# Patient Record
Sex: FEMALE | Race: WHITE | NOT HISPANIC OR LATINO | Employment: OTHER | URBAN - METROPOLITAN AREA
[De-identification: names, ages, dates, MRNs, and addresses within clinical notes are randomized per-mention and may not be internally consistent; named-entity substitution may affect disease eponyms.]

---

## 2017-01-04 ENCOUNTER — OFFICE VISIT (OUTPATIENT)
Dept: FAMILY MEDICINE | Facility: CLINIC | Age: 67
End: 2017-01-04
Payer: MEDICARE

## 2017-01-04 VITALS
HEART RATE: 55 BPM | DIASTOLIC BLOOD PRESSURE: 85 MMHG | TEMPERATURE: 99 F | SYSTOLIC BLOOD PRESSURE: 133 MMHG | WEIGHT: 136.25 LBS | BODY MASS INDEX: 23.26 KG/M2 | HEIGHT: 64 IN

## 2017-01-04 DIAGNOSIS — M94.9 DISORDER OF BONE AND CARTILAGE: ICD-10-CM

## 2017-01-04 DIAGNOSIS — M85.80 OSTEOPENIA: ICD-10-CM

## 2017-01-04 DIAGNOSIS — C50.912 BILATERAL MALIGNANT NEOPLASM OF BREAST IN FEMALE, UNSPECIFIED SITE OF BREAST: ICD-10-CM

## 2017-01-04 DIAGNOSIS — E04.1 THYROID NODULE: ICD-10-CM

## 2017-01-04 DIAGNOSIS — Z13.0 SCREENING FOR DEFICIENCY ANEMIA: ICD-10-CM

## 2017-01-04 DIAGNOSIS — C50.911 BILATERAL MALIGNANT NEOPLASM OF BREAST IN FEMALE, UNSPECIFIED SITE OF BREAST: ICD-10-CM

## 2017-01-04 DIAGNOSIS — Z00.00 ROUTINE MEDICAL EXAM: Primary | ICD-10-CM

## 2017-01-04 DIAGNOSIS — Z23 IMMUNIZATION DUE: ICD-10-CM

## 2017-01-04 DIAGNOSIS — M89.9 DISORDER OF BONE AND CARTILAGE: ICD-10-CM

## 2017-01-04 PROBLEM — E21.0 PRIMARY HYPERPARATHYROIDISM: Status: ACTIVE | Noted: 2017-01-04

## 2017-01-04 PROCEDURE — 90670 PCV13 VACCINE IM: CPT | Mod: S$GLB,,, | Performed by: FAMILY MEDICINE

## 2017-01-04 PROCEDURE — 99999 PR PBB SHADOW E&M-EST. PATIENT-LVL III: CPT | Mod: PBBFAC,,, | Performed by: FAMILY MEDICINE

## 2017-01-04 PROCEDURE — 99397 PER PM REEVAL EST PAT 65+ YR: CPT | Mod: 25,S$GLB,, | Performed by: FAMILY MEDICINE

## 2017-01-04 PROCEDURE — G0009 ADMIN PNEUMOCOCCAL VACCINE: HCPCS | Mod: S$GLB,,, | Performed by: FAMILY MEDICINE

## 2017-01-04 RX ORDER — CHOLECALCIFEROL (VITAMIN D3) 125 MCG
1 CAPSULE ORAL DAILY
COMMUNITY
End: 2022-09-13

## 2017-01-04 RX ORDER — MINOXIDIL 50 MG/G
AEROSOL, FOAM TOPICAL DAILY
COMMUNITY
End: 2023-02-27

## 2017-01-04 NOTE — MR AVS SNAPSHOT
[facility]  [address] [address]  Phone: [phone]  Fax: [phone]                  Tiana Joseph   2017 1:20 PM   Office Visit    Description:  Female : 1950   Provider:  Tena Blanco MD   Department:  [facility]           Reason for Visit     Annual Exam           Diagnoses this Visit        Comments    Routine medical exam    -  Primary     Osteopenia         Immunization due         Screening for deficiency anemia         Disorder of bone and cartilage         Thyroid nodule         Bilateral malignant neoplasm of breast in female, unspecified site of breast                To Do List           Future Appointments        Provider Department Dept Phone    1/10/2017 1:00 PM [facility] DEXA1 [facility] [phone]      Goals (5 Years of Data)     None      Follow-Up and Disposition     Return in about 1 year (around 2018) for routine medical exam.    Follow-up and Disposition History      [facility]     [facility] -  Assistance  Registered nurses in the [facility] provide clinical advisement, health education, appointment booking, and other advisory services.  Call for this free service at [phone].             Medications           Message regarding Medications     Verify the changes and/or additions to your medication regime listed below are the same as discussed with your clinician today.  If any of these changes or additions are incorrect, please notify your healthcare provider.        STOP taking these medications     hydrOXYzine HCl (ATARAX) 25 MG tablet Take 25 mg by mouth 3 (three) times daily as needed for Itching.           Verify that the below list of medications is an accurate representation of the medications you are currently taking.  If none reported, the list may be blank. If incorrect, please contact your healthcare provider. Carry this list with you in case of  "emergency.           Current Medications     amino acids-minerals (A/G PRO) Tab Take 2 tablets by mouth 3 (three) times daily.    aspirin (ECOTRIN) 81 MG EC tablet Take 81 mg by mouth once daily.    biotin 10,000 mcg TbDL Take 1 tablet by mouth once daily.    CALCIUM CARBONATE/VITAMIN D3 (CALCIUM 500 + D ORAL) Take by mouth. 1 Capsule Oral Every evening    cetirizine (ZYRTEC) 5 MG tablet Take 5 mg by mouth daily as needed.     coenzyme Q10-vitamin E (COQ10 ) 100-100 mg-unit Cap Take by mouth. 1 Capsule Oral Every day    fish oil-dha-epa 1,200-144-216 mg Cap Take by mouth. 1 Capsule Oral Every day    minoxidil (ROGAINE) 5 % Foam Apply topically once daily.    multivitamin (THERAGRAN) per tablet Take by mouth. 1 Tablet Oral Every day           Clinical Reference Information           Vital Signs - Last Recorded  Most recent update: 1/4/2017  1:27 PM by Ninfa Mason MA    BP Pulse Temp Ht Wt BMI    133/85 (!) 55 98.9 °F (37.2 °C) (Oral) 5' 3.5" (1.613 m) 61.8 kg (136 lb 3.9 oz) 23.76 kg/m2      Blood Pressure          Most Recent Value    BP  133/85      Allergies as of 1/4/2017     Sulfa (Sulfonamide Antibiotics)      Immunizations Administered on Date of Encounter - 1/4/2017     Name Date Dose VIS Date Route    Pneumococcal Conjugate - 13 Valent 1/4/2017 0.5 mL 11/5/2015 Intramuscular      Orders Placed During Today's Visit      Normal Orders This Visit    Pneumococcal Conjugate Vaccine (13 Valent) (IM)     Future Labs/Procedures Expected by Expires    CBC auto differential  1/4/2017 1/4/2018    Comprehensive metabolic panel  1/4/2017 1/4/2018    DXA Bone Density Spine And Hip_Axial Skeleton  1/4/2017 1/4/2018    Lipid panel  1/4/2017 1/4/2018    TSH  1/4/2017 1/4/2018      "

## 2017-01-04 NOTE — PROGRESS NOTES
CHIEF COMPLAINT:  Routine medical exam      HISTORY OF PRESENT ILLNESS:  Tiana Joseph is a 67 y.o. female who presents to clinic as a new patient to me for a routine medical exam. She has no specific concerns. She follows up with endocrinology, oncology. She is due for screening lab work, DEXA scan. She follows up with her dentist, optometrist annually.       REVIEW OF SYSTEMS:  The patient denies any fever, chills, night sweats, headaches, vision changes, difficulty speaking or swallowing, decreased hearing, weight loss, weight gain, chest pain, palpitations, shortness of breath, cough, nausea, vomiting, abdominal pain, dysuria, diarrhea, constipation, hematuria, hematochezia, melena, changes in her hair, skin, nails, numbness or weakness in her extremities, erythema, pain or swelling over any of her joints, myalgia, swollen glands, easy bruising, fatigue, edema, symptoms of anxiety or depression. She denies any vaginal discharge, breast masses, nipple discharge, change in the skin overlying her breasts.      MEDICATIONS:   Reviewed and/or reconciled in EPIC    ALLERGIES:  Reviewed and/or reconciled in [facility]    PAST MEDICAL/SURGICAL HISTORY:   Past Medical History   Diagnosis Date    Breast cancer 2000    Depression     Osteopenia       Past Surgical History   Procedure Laterality Date    Breast lumpectomy  2000     Right breast    Hysterectomy  1998     KELSEY, fibroid    Bilateral oophorectomy       benign    Femoral hernia  2010     right side repair    Mastectomy  2013      bilateral     Tonsillectomy      Adenoidectomy      Hemorrhoid surgery         FAMILY HISTORY:    Family History   Problem Relation Age of Onset    Cancer Mother      breast    Breast cancer Mother      Breast cancer-75yrs    Hypertension Father     Heart disease Father 56     MI    Cancer Maternal Aunt      ovarian    Ovarian cancer Maternal Aunt     Cancer Maternal Grandfather      testicular / prostate    Stroke Paternal  "Grandmother     Breast cancer Maternal Grandmother      Breast cancer-42yrs    Parkinsonism Paternal Grandfather     Breast cancer Other      breast cancer in maternal great greatgrandmother    Amblyopia Neg Hx     Blindness Neg Hx     Cataracts Neg Hx     Diabetes Neg Hx     Glaucoma Neg Hx     Macular degeneration Neg Hx     Retinal detachment Neg Hx     Strabismus Neg Hx     Thyroid disease Neg Hx     Colon cancer Neg Hx        SOCIAL HISTORY:    Social History     Social History    Marital status:      Spouse name: N/A    Number of children: N/A    Years of education: N/A     Occupational History    Not on file.     Social History Main Topics    Smoking status: Never Smoker    Smokeless tobacco: Never Used    Alcohol use 0.6 oz/week     1 Glasses of wine per week      Comment: Occasionally    Drug use: No    Sexual activity: Yes     Partners: Male     Birth control/ protection: Post-menopausal      Comment:      Other Topics Concern    Not on file     Social History Narrative    No narrative on file     She eats a healthy diet, goes to [employer] and ballroom dances for exercises.     PHYSICAL EXAM:  VITAL SIGNS:   Vitals:    01/04/17 1317   BP: 133/85   Pulse: (!) 55   Temp: 98.9 °F (37.2 °C)   TempSrc: Oral   Weight: 61.8 kg (136 lb 3.9 oz)   Height: 5' 3.5" (1.613 m)     GENERAL:  Patient appears well nourished, sitting on exam table, in no acute distress.  HEENT:  Atraumatic, normocephalic, PERRLA, EOMI, no conjunctival injection, sclerae are anicteric, normal external auditory canals,TMs clear b/l, gross hearing intact to whisper, MMM, no oropharygneal erythema or exudate.  NECK:  Supple, normal ROM, trachea is midline , no supraclavicular or cervical LAD or masses palpated.  Thyroid gland not palpable.  CARDIOVASCULAR:  RRR, normal S1 and S2, no m/r/g.  RESPIRATORY:  CTA b/l, no wheezes, rhonchi, rales.  No increased work of breathing, no  use of " accessory muscles.  ABDOMEN:  Soft, nontender, nondistended, normoactive bowel sounds in all four quadrants, no rebound or guarding, no HSM or masses palpated.  Normal percussion.  EXTREMITIES:  2+ DP pulses b/l, no edema.  SKIN:  Warm, no lesions on exposed skin.  NEUROMUSCULAR:  Cranial nerves II-XII grossly intact.  Strength is 4+/5 over upper and lower extremity flexors/extensors b/l, 2+ biceps and patellar reflexes b/l. No clubbing or cyanosis of digits/nails.  Steady gait.  PSYCH:  Patient is alert and oriented to person, time, place. They are appropriately dressed and groomed. There is normal eye contact. Rate and tone of speech is normal. Normal insight, judgement. Normal thought content and process.     LABORATORY/IMAGING STUDIES: pending    ASSESSMENT/PLAN: This is a 67 y.o. female who presents to clinic for routine medical exam  1. Routine medical exam: We will check a screening CBC, CMP, TSH, fasting lipid panel. Patient will be notified of these results and the need for any further evaluation and treatment.  She will follow up with her dentist/optometrist as scheduled. We discussed the importance of increasing her exercise, continuing with a healthy, well-balanced diet.   2. Osteopenia: DEXA scan  3. Immunization due: will receive prevnar 13 in clinic today and pneumovax in 6 months          Tena Blanco MD

## 2017-01-10 ENCOUNTER — HOSPITAL ENCOUNTER (OUTPATIENT)
Dept: RADIOLOGY | Facility: CLINIC | Age: 67
Discharge: HOME | End: 2017-01-10
Attending: FAMILY MEDICINE
Payer: MEDICARE

## 2017-01-10 DIAGNOSIS — M94.9 DISORDER OF BONE AND CARTILAGE: ICD-10-CM

## 2017-01-10 DIAGNOSIS — M89.9 DISORDER OF BONE AND CARTILAGE: ICD-10-CM

## 2017-01-10 DIAGNOSIS — M85.80 OSTEOPENIA: ICD-10-CM

## 2017-01-10 PROCEDURE — 77080 DXA BONE DENSITY AXIAL: CPT | Mod: 26,,, | Performed by: RADIOLOGY

## 2017-01-10 PROCEDURE — 77080 DXA BONE DENSITY AXIAL: CPT | Mod: TC,PO

## 2017-01-14 LAB
ALBUMIN SERPL-MCNC: 4.7 G/DL (ref 3.6–4.8)
ALBUMIN/GLOB SERPL: 2.2 {RATIO} (ref 1.1–2.5)
ALP SERPL-CCNC: 67 IU/L (ref 39–117)
ALT SERPL-CCNC: 16 IU/L (ref 0–32)
AMBIG ABBREV CMP 14 DEFAULT: NORMAL
AMBIG ABBREV LP DEFAULT: NORMAL
AST SERPL-CCNC: 17 IU/L (ref 0–40)
BASOPHILS # BLD AUTO: 0.1 X10E3/UL (ref 0–0.2)
BASOPHILS NFR BLD AUTO: 2 %
BILIRUB SERPL-MCNC: 0.4 MG/DL (ref 0–1.2)
BUN SERPL-MCNC: 19 MG/DL (ref 8–27)
BUN/CREAT SERPL: 30 (ref 11–26)
CALCIUM SERPL-MCNC: 9.9 MG/DL (ref 8.7–10.3)
CHLORIDE SERPL-SCNC: 102 MMOL/L (ref 96–106)
CHOLEST SERPL-MCNC: 201 MG/DL (ref 100–199)
CO2 SERPL-SCNC: 25 MMOL/L (ref 18–29)
CREAT SERPL-MCNC: 0.63 MG/DL (ref 0.57–1)
EOSINOPHIL # BLD AUTO: 0.2 X10E3/UL (ref 0–0.4)
EOSINOPHIL NFR BLD AUTO: 3 %
ERYTHROCYTE [DISTWIDTH] IN BLOOD BY AUTOMATED COUNT: 13.6 % (ref 12.3–15.4)
GLOBULIN SER CALC-MCNC: 2.1 G/DL (ref 1.5–4.5)
GLUCOSE SERPL-MCNC: 99 MG/DL (ref 65–99)
HCT VFR BLD AUTO: 43.1 % (ref 34–46.6)
HDLC SERPL-MCNC: 66 MG/DL
HGB BLD-MCNC: 13.8 G/DL (ref 11.1–15.9)
IMM GRANULOCYTES # BLD: 0 X10E3/UL (ref 0–0.1)
IMM GRANULOCYTES NFR BLD: 0 %
LDLC SERPL CALC-MCNC: 122 MG/DL (ref 0–99)
LYMPHOCYTES # BLD AUTO: 2.1 X10E3/UL (ref 0.7–3.1)
LYMPHOCYTES NFR BLD AUTO: 37 %
MCH RBC QN AUTO: 28.1 PG (ref 26.6–33)
MCHC RBC AUTO-ENTMCNC: 32 G/DL (ref 31.5–35.7)
MCV RBC AUTO: 88 FL (ref 79–97)
MONOCYTES # BLD AUTO: 0.4 X10E3/UL (ref 0.1–0.9)
MONOCYTES NFR BLD AUTO: 7 %
NEUTROPHILS # BLD AUTO: 2.9 X10E3/UL (ref 1.4–7)
NEUTROPHILS NFR BLD AUTO: 51 %
PLATELET # BLD AUTO: 247 X10E3/UL (ref 150–379)
POTASSIUM SERPL-SCNC: 4.8 MMOL/L (ref 3.5–5.2)
PROT SERPL-MCNC: 6.8 G/DL (ref 6–8.5)
RBC # BLD AUTO: 4.91 X10E6/UL (ref 3.77–5.28)
SODIUM SERPL-SCNC: 143 MMOL/L (ref 134–144)
TRIGL SERPL-MCNC: 66 MG/DL (ref 0–149)
TSH SERPL DL<=0.005 MIU/L-ACNC: 2.46 UIU/ML (ref 0.45–4.5)
VLDLC SERPL CALC-MCNC: 13 MG/DL (ref 5–40)
WBC # BLD AUTO: 5.6 X10E3/UL (ref 3.4–10.8)

## 2017-01-30 ENCOUNTER — TELEPHONE (OUTPATIENT)
Dept: ADMINISTRATIVE | Facility: OTHER | Age: 67
End: 2017-01-30

## 2017-01-30 ENCOUNTER — TELEPHONE (OUTPATIENT)
Dept: ENDOCRINOLOGY | Facility: CLINIC | Age: 67
End: 2017-01-30

## 2017-01-30 DIAGNOSIS — E83.52 HYPERCALCEMIA: Primary | ICD-10-CM

## 2017-01-30 NOTE — TELEPHONE ENCOUNTER
----- Message from Karine Hook sent at 1/30/2017 12:09 PM CST -----  Contact: self  I sent to you due to the pt requesting main campus.  ----- Message -----     From: Yfn Gates     Sent: 1/30/2017  11:56 AM       To: Karine Tilley, i am not sure if Mrs. Joseph would like to switch over to [facility] if so please send back to me. Thanks   ----- Message -----     From: Karine Hook     Sent: 1/30/2017  11:40 AM       To: Yfn Gates    Pt is calling to schedule her March follow up visit.  Pt would like a call before scheduling.    Contact number  [phone]

## 2017-01-30 NOTE — TELEPHONE ENCOUNTER
----- Message from Karine Hook sent at 1/30/2017 12:59 PM CST -----  Contact: self  Pt is returning a missed call    Contact number [phone]

## 2017-02-13 DIAGNOSIS — C50.919 MALIGNANT NEOPLASM OF FEMALE BREAST, UNSPECIFIED LATERALITY, UNSPECIFIED SITE OF BREAST: Primary | ICD-10-CM

## 2017-03-28 ENCOUNTER — OFFICE VISIT (OUTPATIENT)
Dept: OPTOMETRY | Facility: CLINIC | Age: 67
End: 2017-03-28
Payer: MEDICARE

## 2017-03-28 ENCOUNTER — OFFICE VISIT (OUTPATIENT)
Dept: HEMATOLOGY/ONCOLOGY | Facility: CLINIC | Age: 67
End: 2017-03-28
Attending: INTERNAL MEDICINE
Payer: MEDICARE

## 2017-03-28 ENCOUNTER — OFFICE VISIT (OUTPATIENT)
Dept: INTERNAL MEDICINE | Facility: CLINIC | Age: 67
End: 2017-03-28
Payer: MEDICARE

## 2017-03-28 ENCOUNTER — TELEPHONE (OUTPATIENT)
Dept: HEMATOLOGY/ONCOLOGY | Facility: CLINIC | Age: 67
End: 2017-03-28

## 2017-03-28 VITALS
RESPIRATION RATE: 20 BRPM | TEMPERATURE: 99 F | WEIGHT: 138 LBS | OXYGEN SATURATION: 95 % | SYSTOLIC BLOOD PRESSURE: 146 MMHG | DIASTOLIC BLOOD PRESSURE: 75 MMHG | HEART RATE: 60 BPM | BODY MASS INDEX: 24.06 KG/M2

## 2017-03-28 VITALS
SYSTOLIC BLOOD PRESSURE: 148 MMHG | RESPIRATION RATE: 16 BRPM | HEART RATE: 58 BPM | WEIGHT: 138 LBS | BODY MASS INDEX: 24.45 KG/M2 | OXYGEN SATURATION: 95 % | DIASTOLIC BLOOD PRESSURE: 80 MMHG | HEIGHT: 63 IN

## 2017-03-28 DIAGNOSIS — I70.0 AORTIC ATHEROSCLEROSIS: ICD-10-CM

## 2017-03-28 DIAGNOSIS — H52.03 HYPEROPIA, BILATERAL: ICD-10-CM

## 2017-03-28 DIAGNOSIS — E21.0 PRIMARY HYPERPARATHYROIDISM: ICD-10-CM

## 2017-03-28 DIAGNOSIS — H52.4 PRESBYOPIA: ICD-10-CM

## 2017-03-28 DIAGNOSIS — Z86.000 HISTORY OF DUCTAL CARCINOMA IN SITU (DCIS) OF BREAST: ICD-10-CM

## 2017-03-28 DIAGNOSIS — Z85.3 HISTORY OF BREAST CANCER: ICD-10-CM

## 2017-03-28 DIAGNOSIS — Z00.00 ENCOUNTER FOR PREVENTIVE HEALTH EXAMINATION: Primary | ICD-10-CM

## 2017-03-28 DIAGNOSIS — L65.9 ALOPECIA: ICD-10-CM

## 2017-03-28 DIAGNOSIS — H25.13 NS (NUCLEAR SCLEROSIS), BILATERAL: ICD-10-CM

## 2017-03-28 DIAGNOSIS — M85.80 OSTEOPENIA: ICD-10-CM

## 2017-03-28 DIAGNOSIS — E04.1 THYROID NODULE: ICD-10-CM

## 2017-03-28 DIAGNOSIS — H04.123 DRY EYE SYNDROME, BILATERAL: Primary | ICD-10-CM

## 2017-03-28 PROCEDURE — 99499 UNLISTED E&M SERVICE: CPT | Mod: S$GLB,,, | Performed by: NURSE PRACTITIONER

## 2017-03-28 PROCEDURE — 92015 DETERMINE REFRACTIVE STATE: CPT | Mod: S$GLB,,, | Performed by: OPTOMETRIST

## 2017-03-28 PROCEDURE — 1159F MED LIST DOCD IN RCRD: CPT | Mod: S$GLB,,, | Performed by: INTERNAL MEDICINE

## 2017-03-28 PROCEDURE — 99999 PR PBB SHADOW E&M-EST. PATIENT-LVL II: CPT | Mod: PBBFAC,,, | Performed by: OPTOMETRIST

## 2017-03-28 PROCEDURE — 99214 OFFICE O/P EST MOD 30 MIN: CPT | Mod: S$GLB,,, | Performed by: INTERNAL MEDICINE

## 2017-03-28 PROCEDURE — 92014 COMPRE OPH EXAM EST PT 1/>: CPT | Mod: S$GLB,,, | Performed by: OPTOMETRIST

## 2017-03-28 PROCEDURE — 99999 PR PBB SHADOW E&M-EST. PATIENT-LVL IV: CPT | Mod: PBBFAC,,, | Performed by: NURSE PRACTITIONER

## 2017-03-28 PROCEDURE — 99999 PR PBB SHADOW E&M-EST. PATIENT-LVL III: CPT | Mod: PBBFAC,,, | Performed by: INTERNAL MEDICINE

## 2017-03-28 PROCEDURE — G0439 PPPS, SUBSEQ VISIT: HCPCS | Mod: S$GLB,,, | Performed by: NURSE PRACTITIONER

## 2017-03-28 PROCEDURE — 1160F RVW MEDS BY RX/DR IN RCRD: CPT | Mod: S$GLB,,, | Performed by: INTERNAL MEDICINE

## 2017-03-28 PROCEDURE — 1157F ADVNC CARE PLAN IN RCRD: CPT | Mod: S$GLB,,, | Performed by: INTERNAL MEDICINE

## 2017-03-28 PROCEDURE — 1126F AMNT PAIN NOTED NONE PRSNT: CPT | Mod: S$GLB,,, | Performed by: INTERNAL MEDICINE

## 2017-03-28 NOTE — MR AVS SNAPSHOT
[facility]  [address]  [place] [address]  Phone: [phone]  Fax: [phone]                  Tiana Joseph   3/28/2017 2:00 PM   Office Visit    Description:  Female : 1950   Provider:  Lacy Parks NP   Department:  [facility]           Reason for Visit     Health Risk Assessment           Diagnoses this Visit        Comments    Encounter for preventive health examination    -  Primary     Aortic atherosclerosis                To Do List           Future Appointments        Provider Department Dept Phone    2017 10:00 AM LAB, [facility] [phone]    2017 11:00 AM Ramos Iverson MD [facility] - Endo/Diab/Metab [phone]      Goals (5 Years of Data)     None      Follow-Up and Disposition     Return in about 1 year (around 3/28/2018) for your next Health Risk Assessment visit.      [facility]     [facility] Line -  Assistance  Registered nurses in the [facility] provide clinical advisement, health education, appointment booking, and other advisory services.  Call for this free service at [phone].             Medications           Message regarding Medications     Verify the changes and/or additions to your medication regime listed below are the same as discussed with your clinician today.  If any of these changes or additions are incorrect, please notify your healthcare provider.             Verify that the below list of medications is an accurate representation of the medications you are currently taking.  If none reported, the list may be blank. If incorrect, please contact your healthcare provider. Carry this list with you in case of emergency.           Current Medications     amino acids-minerals (A/G PRO) Tab Take 2 tablets by mouth 3 (three) times daily.    aspirin (ECOTRIN) 81 MG EC tablet Take 81 mg by mouth once daily.    biotin 10,000 mcg TbDL Take 1 tablet  by mouth once daily.    CALCIUM CARBONATE/VITAMIN D3 (CALCIUM 500 + D ORAL) Take by mouth. 1 Capsule Oral Every evening    cetirizine (ZYRTEC) 5 MG tablet Take 5 mg by mouth daily as needed.     coenzyme Q10-vitamin E (COQ10 ) 100-100 mg-unit Cap Take by mouth. 1 Capsule Oral Every day    fish oil-dha-epa 1,200-144-216 mg Cap Take by mouth. 1 Capsule Oral Every day    lifitegrast 5 % Dpet Apply 1 drop to eye 2 (two) times daily.    minoxidil (ROGAINE) 5 % Foam Apply topically once daily.    multivitamin (THERAGRAN) per tablet Take by mouth. 1 Tablet Oral Every day           Clinical Reference Information           Your Vitals Were     BP Pulse Resp SpO2          148/80 (BP Location: Left arm, Patient Position: Sitting, BP Method: Manual) 52 16 95%        Blood Pressure          Most Recent Value    BP  (!)  148/80      Allergies as of 3/28/2017     Sulfa (Sulfonamide Antibiotics)      Immunizations Administered on Date of Encounter - 3/28/2017     None      Instructions      Counseling and Referral of Other Preventative  (Italic type indicates deductible and co-insurance are waived)    Patient Name: Tiana Joseph  Today's Date: 3/28/2017      SERVICE LIMITATIONS RECOMMENDATION    Vaccines    · Pneumococcal (once after 65)    · Influenza (annually)    · Hepatitis B (if medium/high risk)    · Prevnar 13      Hepatitis B medium/high risk factors:       - End-stage renal disease       - Hemophiliacs who received Factor VII or         IX concentrates       - Clients of institutions for the mentally             retarded       - Persons who live in the same house as          a HepB carrier       - Homosexual men       - Illicit injectable drug abusers     Pneumococcal: N/A.  Will receive in 2018     Influenza: Done, repeat in one year     Hepatitis B: N/A     Prevnar 13: N/A.  Completed    Mammogram (biennial age 50-74)  Annually (age 40 or over)  Not clinically indicated    Pap (up to age 70 and after 70 if unknown  history or abnormal study last 10 years)    N/A     The USPSTF recommends against screening for cervical cancer in women who have had a hysterectomy with removal of the cervix and who do not have a history of a high-grade precancerous lesion (cervical intraepithelial neoplasia [MEAGAN] grade 2 or 3) or cervical cancer.     Colorectal cancer screening (to age 75)    · Fecal occult blood test (annual)  · Flexible sigmoidoscopy (5y)  · Screening colonoscopy (10y)  · Barium enema   Last done 12/2008, recommend to repeat every 10  years    Diabetes self-management training (no USPSTF recommendations)  Requires referral by treating physician for patient with diabetes or renal disease. 10 hours of initial DSMT sessions of no less than 30 minutes each in a continuous 12-month period. 2 hours of follow-up DSMT in subsequent years.  N/A    Bone mass measurements (age 65 & older, biennial)  Requires diagnosis related to osteoporosis or estrogen deficiency. Biennial benefit unless patient has history of long-term glucocorticoid  Last done 1/2017, recommend to repeat every 3  years    Glaucoma screening (no USPSTF recommendation)  Diabetes mellitus, family history   , age 50 or over    American, age 65 or over  Last done 3/2017, recommend to repeat every 1  years    Medical nutrition therapy for diabetes or renal disease (no recommended schedule)  Requires referral by treating physician for patient with diabetes or renal disease or kidney transplant within the past 3 years.  Can be provided in same year as diabetes self-management training (DSMT), and CMS recommends medical nutrition therapy take place after DSMT. Up to 3 hours for initial year and 2 hours in subsequent years.  N/A    Cardiovascular screening blood tests (every 5 years)  · Fasting lipid panel  Order as a panel if possible  Last done 1/2017, recommend to repeat every 1  years    Diabetes screening tests (at least every 3 years, Medicare  covers annually or at 6-month intervals for prediabetic patients)  · Fasting blood sugar (FBS) or glucose tolerance test (GTT)  Patient must be diagnosed with one of the following:       - Hypertension       - Dyslipidemia       - Obesity (BMI 30kg/m2)       - Previous elevated impaired FBS or GTT       ... or any two of the following:       - Overweight (BMI 25 but <30)       - Family history of diabetes       - Age 65 or older       - History of gestational diabetes or birth of baby weighing more than 9 pounds  Last done 1/2017, recommend to repeat every 1  years    Abdominal aortic aneurysm screening (once)  · Sonogram   Limited to patients who meet one of the following criteria:       - Men who are 65-75 years old and have smoked more than 100 cigarette in their lifetime       - Anyone with a family history of abdominal aortic aneurysm       - Anyone recommended for screening by the USPSTF  N/A    HIV screening (annually for increased risk patients)  · HIV-1 and HIV-2 by EIA, or SHA, rapid antibody test or oral mucosa transudate  Patients must be at increased risk for HIV infection per USPSTF guidelines or pregnant. Tests covered annually for patient at increased risk or as requested by the patient. Pregnant patients may receive up to 3 tests during pregnancy.  Risks discussed, screening is not recommended    Smoking cessation counseling (up to 8 sessions per year)  Patients must be asymptomatic of tobacco-related conditions to receive as a preventative service.  Not a smoker    Subsequent annual wellness visit  At least 12 months since last AWV  Return in one year     The following information is provided to all patients.  This information is to help you find resources for any of the problems found today that may be affecting your health:                Living healthy guide: www.[facility].gov      Understanding Diabetes: www.diabetes.org      Eating healthy: www.cdc.gov/healthyweight      CDC home safety  checklist: www.cdc.gov/steadi/patient.html      Agency on Aging: www.goea.[place].gov      Alcoholics anonymous (AA): www.aa.org      Physical Activity: www.yunior.nih.gov/ww5aqdo      Tobacco use: www.quitwithusla.org          Language Assistance Services     ATTENTION: Language assistance services are available, free of charge. Please call [phone].      ATENCIÓN: Si sabasla jd, tiene a huffman disposición servicios gratuitos de asistencia lingüística. Llame al [phone].     CHÚ Ý: N?u b?n nói Ti?ng Vi?t, có các d?ch v? h? tr? ngôn ng? mi?n phí dành cho b?n. G?i s? [phone].         [facility] complies with applicable Federal civil rights laws and does not discriminate on the basis of race, color, national origin, age, disability, or sex.

## 2017-03-28 NOTE — PROGRESS NOTES
Subjective:       Patient ID: Tiana Joseph is a 67 y.o. female.    Chief Complaint: Follow-up    HPI     Returns for follow-up of her history of breat cancer.  Has overall felt well.  In the interval-    As follow-up for hair loss discussed last year- she avoids the sun and uses Rogaine in affected areas  Energy level is adequate  Reports exercising 3-4 x per week (Pilates, yoga, ballroom dancing)  Cyst found on thyroid by endocrinology- will be monitored  No pain other than age related stiffness in joints    Oncology History:  Diagnosed in 2002 with breast cancer (intracystic papillary)- very small, discovered at [facility] by mammogram, Dr. Cosby wanted re-read on pathology and underwent lumpectomy, required re-excision and XRT.  Negative BRCA performed for strong family history (mother breast ca, maternal gm breast ca, maternal aunt- ovarian ca)  Received no adjuvant therapy     In December 2012- she had an abnormal left mammogram, bx +DCIS,, opted for  Bilateral mastectomy 1/31/13.  Took Evista x 1-2 months- leg cramps- stopped after discussing with Dr. Cosby due to side effects    Review of Systems   Constitutional: Negative for activity change, appetite change, chills, fatigue, fever and unexpected weight change.   HENT: Negative for congestion, ear pain, hearing loss, mouth sores, nosebleeds, postnasal drip, rhinorrhea, sinus pressure and sore throat.    Eyes: Negative for redness and visual disturbance (visual floaters).   Respiratory: Negative for cough and shortness of breath.    Cardiovascular: Negative for chest pain, palpitations and leg swelling.   Gastrointestinal: Negative for abdominal distention, abdominal pain, blood in stool, constipation, nausea and vomiting.   Genitourinary: Negative for difficulty urinating, dysuria, frequency, pelvic pain and urgency.   Musculoskeletal: Negative for arthralgias, back pain, gait problem, joint swelling and myalgias.   Skin: Negative for color change,  pallor, rash and wound.   Neurological: Negative for dizziness, weakness, light-headedness, numbness and headaches.   Psychiatric/Behavioral: Negative for dysphoric mood. The patient is not nervous/anxious.        Objective:      Physical Exam   Constitutional: She is oriented to person, place, and time. She appears well-developed and well-nourished. No distress.   Presents alone   HENT:   Head: Normocephalic and atraumatic.   Right Ear: External ear normal.   Left Ear: External ear normal.   Nose: Nose normal.   Mouth/Throat: Oropharynx is clear and moist. No oropharyngeal exudate.   Eyes: Conjunctivae and EOM are normal. Pupils are equal, round, and reactive to light. Right eye exhibits no discharge. Left eye exhibits no discharge. No scleral icterus. Right pupil is round and reactive. Left pupil is round and reactive.   Neck: Normal range of motion. Neck supple. No JVD present. No tracheal deviation present. No thyromegaly present.   Cardiovascular: Normal rate, regular rhythm, normal heart sounds and intact distal pulses.  Exam reveals no gallop and no friction rub.    No murmur heard.  Pulmonary/Chest: Effort normal and breath sounds normal. No respiratory distress. She has no wheezes. She has no rales.   Abdominal: Soft. Bowel sounds are normal. She exhibits no distension and no mass. There is no hepatosplenomegaly. There is no tenderness. There is no rebound and no guarding.   Musculoskeletal: Normal range of motion. She exhibits no edema or tenderness.   Lymphadenopathy:        Head (right side): No submandibular adenopathy present.        Head (left side): No submandibular adenopathy present.     She has no cervical adenopathy.        Right cervical: No superficial cervical, no deep cervical and no posterior cervical adenopathy present.       Left cervical: No superficial cervical, no deep cervical and no posterior cervical adenopathy present.        Right: No inguinal and no supraclavicular adenopathy  present.        Left: No inguinal and no supraclavicular adenopathy present.   Neurological: She is alert and oriented to person, place, and time. She has normal strength. No cranial nerve deficit or sensory deficit. She exhibits normal muscle tone. Coordination normal.   Skin: Skin is warm and dry. No bruising, no lesion, no petechiae and no rash noted. She is not diaphoretic. No erythema. No pallor.   Psychiatric: She has a normal mood and affect. Her behavior is normal. Judgment and thought content normal. Her mood appears not anxious. She does not exhibit a depressed mood.   Nursing note and vitals reviewed.    Labs- reviewed  Assessment:       1. History of breast cancer    2. History of ductal carcinoma in situ (DCIS) of breast        Plan:     1. USHA  RTC 1 year  Last Vit D adequate  Knows to call for any issues

## 2017-03-28 NOTE — PROGRESS NOTES
"Tiana Joseph presented for a  Medicare AWV and comprehensive Health Risk Assessment today. The following components were reviewed and updated:    · Medical history  · Family History  · Social history  · Allergies and Current Medications  · Health Risk Assessment  · Health Maintenance  · Care Team     ** See Completed Assessments for Annual Wellness Visit within the encounter summary.**       The following assessments were completed:  · Living Situation  · CAGE  · Depression Screening  · Timed Get Up and Go  · Whisper Test  · Cognitive Function Screening  · Nutrition Screening  · ADL Screening  · PAQ Screening    Vitals:    03/28/17 1352   BP: (!) 148/80   BP Location: Left arm   Patient Position: Sitting   BP Method: Manual   Pulse: (!) 58   Resp: 16   SpO2: 95%   Weight: 62.6 kg (138 lb)   Height: 5' 3" (1.6 m)     Body mass index is 24.45 kg/(m^2).  Physical Exam   Constitutional: She is oriented to person, place, and time. She appears well-developed and well-nourished. No distress.   HENT:   Head: Normocephalic.   Right Ear: External ear normal.   Left Ear: External ear normal.   Nose: Nose normal.   Eyes: Conjunctivae are normal. No scleral icterus.   Neck: Normal range of motion. Neck supple.   Cardiovascular: Normal rate, regular rhythm, normal heart sounds and intact distal pulses.  Exam reveals no gallop and no friction rub.    No murmur heard.  Pulmonary/Chest: Effort normal and breath sounds normal. No respiratory distress. She has no wheezes. She has no rales. She exhibits no tenderness.   Abdominal: Soft. Bowel sounds are normal.   Musculoskeletal: Normal range of motion. She exhibits no edema.   Neurological: She is alert and oriented to person, place, and time.   Skin: Skin is warm and dry. She is not diaphoretic. No erythema.   Psychiatric: She has a normal mood and affect. Her behavior is normal. Judgment and thought content normal.   Vitals reviewed.        Diagnoses and health risks identified today " and associated recommendations/orders:    1. Aortic atherosclerosis  Noted on CT Abd from 11/4/2010.  Chronic.  Stable.  Treated with diet and exercise.  Monitored by PCP.     2. Primary hyperparathyroidism  Chronic.  Stable.  Monitored by Endocrinology.      3. Encounter for preventive health examination  Annual Health Risk Assessment (HRA) visit today.  Patient given annual wellness paperwork to take home.  Encouraged to return in 1 year for subsequent HRA visit.      4. Thyroid nodule  Noted on soft tissue ultrasound of head/neck from 3/23/2016.  Report states thyroid nodule has benign features.  Chronic.  Stable.  Monitored by Endocrinology.      5. Osteopenia  Noted on DXA Spine/hip from 1/10/2017.  Chronic.  Stable.  Treated with calcium and vitamin D supplementation.  Monitored by PCP and Endocrinology.      6. Alopecia  Chronic.  Improved.  Treated with biotin, minoxidil, and A/G Pro.  Monitored by PCP, Endocrinology, and Hematology/Oncology.      7. History of breast cancer  Diagnosed in 2002 with breast cancer, s/p lumpectomy, re-excision, and radiation therapy.  Abnormal mammogram in 2012 (ductal carcinoma in situ) s/p bilateral mastectomy on 1/31/2013.  Stable.  Monitored by Hematology/Oncology.      Provided Tiana with a 5-10 year written screening schedule and personal prevention plan. Recommendations were developed using the USPSTF age appropriate recommendations. Education, counseling, and referrals were provided as needed. After Visit Summary printed and given to patient which includes a list of additional screenings\tests needed.    Return in about 1 year (around 3/28/2018) for your next Health Risk Assessment visit.    Lacy Parks NP

## 2017-03-28 NOTE — PATIENT INSTRUCTIONS
Counseling and Referral of Other Preventative  (Italic type indicates deductible and co-insurance are waived)    Patient Name: Tiana Joseph  Today's Date: 3/28/2017      SERVICE LIMITATIONS RECOMMENDATION    Vaccines    · Pneumococcal (once after 65)    · Influenza (annually)    · Hepatitis B (if medium/high risk)    · Prevnar 13      Hepatitis B medium/high risk factors:       - End-stage renal disease       - Hemophiliacs who received Factor VII or         IX concentrates       - Clients of institutions for the mentally             retarded       - Persons who live in the same house as          a HepB carrier       - Homosexual men       - Illicit injectable drug abusers     Pneumococcal: N/A.  Will receive in 2018     Influenza: Done, repeat in one year     Hepatitis B: N/A     Prevnar 13: N/A.  Completed    Mammogram (biennial age 50-74)  Annually (age 40 or over)  Not clinically indicated    Pap (up to age 70 and after 70 if unknown history or abnormal study last 10 years)    N/A     The USPSTF recommends against screening for cervical cancer in women who have had a hysterectomy with removal of the cervix and who do not have a history of a high-grade precancerous lesion (cervical intraepithelial neoplasia [MEAGAN] grade 2 or 3) or cervical cancer.     Colorectal cancer screening (to age 75)    · Fecal occult blood test (annual)  · Flexible sigmoidoscopy (5y)  · Screening colonoscopy (10y)  · Barium enema   Last done 12/2008, recommend to repeat every 10  years    Diabetes self-management training (no USPSTF recommendations)  Requires referral by treating physician for patient with diabetes or renal disease. 10 hours of initial DSMT sessions of no less than 30 minutes each in a continuous 12-month period. 2 hours of follow-up DSMT in subsequent years.  N/A    Bone mass measurements (age 65 & older, biennial)  Requires diagnosis related to osteoporosis or estrogen deficiency. Biennial benefit unless patient has  history of long-term glucocorticoid  Last done 1/2017, recommend to repeat every 3  years    Glaucoma screening (no USPSTF recommendation)  Diabetes mellitus, family history   , age 50 or over    American, age 65 or over  Last done 3/2017, recommend to repeat every 1  years    Medical nutrition therapy for diabetes or renal disease (no recommended schedule)  Requires referral by treating physician for patient with diabetes or renal disease or kidney transplant within the past 3 years.  Can be provided in same year as diabetes self-management training (DSMT), and CMS recommends medical nutrition therapy take place after DSMT. Up to 3 hours for initial year and 2 hours in subsequent years.  N/A    Cardiovascular screening blood tests (every 5 years)  · Fasting lipid panel  Order as a panel if possible  Last done 1/2017, recommend to repeat every 1  years    Diabetes screening tests (at least every 3 years, Medicare covers annually or at 6-month intervals for prediabetic patients)  · Fasting blood sugar (FBS) or glucose tolerance test (GTT)  Patient must be diagnosed with one of the following:       - Hypertension       - Dyslipidemia       - Obesity (BMI 30kg/m2)       - Previous elevated impaired FBS or GTT       ... or any two of the following:       - Overweight (BMI 25 but <30)       - Family history of diabetes       - Age 65 or older       - History of gestational diabetes or birth of baby weighing more than 9 pounds  Last done 1/2017, recommend to repeat every 1  years    Abdominal aortic aneurysm screening (once)  · Sonogram   Limited to patients who meet one of the following criteria:       - Men who are 65-75 years old and have smoked more than 100 cigarette in their lifetime       - Anyone with a family history of abdominal aortic aneurysm       - Anyone recommended for screening by the USPSTF  N/A    HIV screening (annually for increased risk patients)  · HIV-1 and HIV-2 by EIA, or  SHA, rapid antibody test or oral mucosa transudate  Patients must be at increased risk for HIV infection per USPSTF guidelines or pregnant. Tests covered annually for patient at increased risk or as requested by the patient. Pregnant patients may receive up to 3 tests during pregnancy.  Risks discussed, screening is not recommended    Smoking cessation counseling (up to 8 sessions per year)  Patients must be asymptomatic of tobacco-related conditions to receive as a preventative service.  Not a smoker    Subsequent annual wellness visit  At least 12 months since last AWV  Return in one year     The following information is provided to all patients.  This information is to help you find resources for any of the problems found today that may be affecting your health:                Living healthy guide: www.[facility].[place].[facility]      Understanding Diabetes: www.diabetes.org      Eating healthy: www.cdc.gov/healthyweight      CDC home safety checklist: www.cdc.gov/steadi/patient.html      Agency on Aging: www.goea.[place].[facility]      Alcoholics anonymous (AA): www.aa.org      Physical Activity: www.yunior.nih.gov/zb2bbhi      Tobacco use: www.quitwithusla.org

## 2017-03-28 NOTE — TELEPHONE ENCOUNTER
Cancelled lab appt.       ----- Message from Jeremy Conley sent at 3/28/2017 11:53 AM CDT -----  Contact: self  Pt states her labs for [facility] should be cancel due to having labs drawn at another facility.

## 2017-03-28 NOTE — PROGRESS NOTES
HPI     Patient's age: 67 y.o.    Approximate date of last eye examination:  7/9/15  Name of last eye doctor seen: Dr. Singer    Pt states that seems as if right eye is getting worse and feels like they   are burning now and f get     Wears glasses? yes       Wears CLs?:  no           Headaches?  no  Eye pain/discomfort?  no                                                                                     Flashes?  no  Floaters?  yes  Diplopia/Double vision?  no    Patient's Ocular History:         Any eye surgeries? no         Family history of eye disease?  no    Significant patient medical history:         1. Diabetes?  no       If yes, IDDM or NIDDM? no   2. HBP?  no                 ! OTC eyedrops currently using:  Lubricant drops - OU PRN   ! Prescription eye meds currently using:  none           Last edited by Lauren Aragon MA on 3/28/2017 10:03 AM.         Assessment /Plan     For exam results, see Encounter Report.    Dry eye syndrome, bilateral  ifitegrast 5 % Dpet; Apply 1 drop to eye 2 (two) times daily.  Dispense: 60 each; Refill: 12   USe artificial tears PRN    NS (nuclear sclerosis), bilateral   Mild, monitor   Hyperopia, bilateral  Presbyopia   Rx specs PAL    CHoroidal nevus OD- stable       RTC 1 year, sooner PRN

## 2017-05-08 ENCOUNTER — OFFICE VISIT (OUTPATIENT)
Dept: ENDOCRINOLOGY | Facility: CLINIC | Age: 67
End: 2017-05-08
Payer: MEDICARE

## 2017-05-08 VITALS
SYSTOLIC BLOOD PRESSURE: 150 MMHG | BODY MASS INDEX: 24.13 KG/M2 | WEIGHT: 141.31 LBS | DIASTOLIC BLOOD PRESSURE: 80 MMHG | HEART RATE: 60 BPM | HEIGHT: 64 IN

## 2017-05-08 DIAGNOSIS — E04.1 THYROID NODULE: Primary | ICD-10-CM

## 2017-05-08 DIAGNOSIS — E21.0 PRIMARY HYPERPARATHYROIDISM: ICD-10-CM

## 2017-05-08 PROCEDURE — 99999 PR PBB SHADOW E&M-EST. PATIENT-LVL IV: CPT | Mod: PBBFAC,GC,, | Performed by: INTERNAL MEDICINE

## 2017-05-08 PROCEDURE — 1159F MED LIST DOCD IN RCRD: CPT | Mod: GC,S$GLB,, | Performed by: INTERNAL MEDICINE

## 2017-05-08 PROCEDURE — 1125F AMNT PAIN NOTED PAIN PRSNT: CPT | Mod: GC,S$GLB,, | Performed by: INTERNAL MEDICINE

## 2017-05-08 PROCEDURE — 99214 OFFICE O/P EST MOD 30 MIN: CPT | Mod: GC,S$GLB,, | Performed by: INTERNAL MEDICINE

## 2017-05-08 NOTE — MR AVS SNAPSHOT
[facility]  [address]  [facility] [address]  Phone: [phone]  Fax: [phone]                  Tiana Joseph   2017 11:00 AM   Office Visit    Description:  Female : 1950   Provider:  Ramos Iverson MD   Department:  [facility]           Reason for Visit     Osteoporosis           Diagnoses this Visit        Comments    Thyroid nodule    -  Primary     Primary hyperparathyroidism                To Do List           Future Appointments        Provider Department Dept Phone    2017 12:35 PM LAB, APPOINTMENT [place] [facility] [phone]    2017 8:15 AM [facility]  [phone]      Goals (5 Years of Data)     None      [facility]     [facility] Care [facility] -  Assistance  Unless otherwise directed by your provider, please contact [facility], our nurse care line that is available for  assistance.     Registered nurses in the [facility] provide: appointment scheduling, clinical advisement, health education, and other advisory services.  Call: [phone] (toll free)               Medications           Message regarding Medications     Verify the changes and/or additions to your medication regime listed below are the same as discussed with your clinician today.  If any of these changes or additions are incorrect, please notify your healthcare provider.             Verify that the below list of medications is an accurate representation of the medications you are currently taking.  If none reported, the list may be blank. If incorrect, please contact your healthcare provider. Carry this list with you in case of emergency.           Current Medications     amino acids-minerals (A/G PRO) Tab Take 2 tablets by mouth 3 (three) times daily.    aspirin (ECOTRIN) 81 MG EC tablet Take 81 mg by mouth once daily.    biotin 10,000 mcg TbDL Take 1  "tablet by mouth once daily.    CALCIUM CARBONATE/VITAMIN D3 (CALCIUM 500 + D ORAL) Take by mouth. 1 Capsule Oral Every evening    cetirizine (ZYRTEC) 5 MG tablet Take 5 mg by mouth daily as needed.     coenzyme Q10-vitamin E (COQ10 ) 100-100 mg-unit Cap Take by mouth. 1 Capsule Oral Every day    fish oil-dha-epa 1,200-144-216 mg Cap Take by mouth. 1 Capsule Oral Every day    lifitegrast 5 % Dpet Apply 1 drop to eye 2 (two) times daily.    minoxidil (ROGAINE) 5 % Foam Apply topically once daily.    multivitamin (THERAGRAN) per tablet Take by mouth. 1 Tablet Oral Every day           Clinical Reference Information           Your Vitals Were     BP Pulse Height Weight BMI    150/80 60 5' 3.5" (1.613 m) 64.1 kg (141 lb 5 oz) 24.64 kg/m2      Blood Pressure          Most Recent Value    BP  (!)  150/80      Allergies as of 5/8/2017     Sulfa (Sulfonamide Antibiotics)      Immunizations Administered on Date of Encounter - 5/8/2017     None      Orders Placed During Today's Visit     Future Labs/Procedures Expected by Expires    Urinalysis  5/8/2017 8/6/2017     Soft Tissue Head Neck Thyroid  5/8/2017 5/8/2018      Language Assistance Services     ATTENTION: Language assistance services are available, free of charge. Please call [phone].      ATENCIÓN: Si habla jd, tiene a huffman disposición servicios gratuitos de asistencia lingüística. Llame al [phone].     CHÚ Ý: N?u b?n nói Ti?ng Vi?t, có các d?ch v? h? tr? ngôn ng? mi?n phí dành cho b?n. G?i s? [phone].         [facility] complies with applicable Federal civil rights laws and does not discriminate on the basis of race, color, national origin, age, disability, or sex.        "

## 2017-05-09 NOTE — PROGRESS NOTES
Subjective:       Patient ID: May Joseph is a 67 y.o. female.    Chief Complaint: Osteoporosis    HPI     Patient is here as a follow up patient.  Has previously seen Dr. Treadwell    Patient with Primary Hyperparathyroidism and thyroid nodule.    No fractures  No glucocorticoid use  Does yoga and pilates 4-5x a week  + nephrolithiasis     Dexa:1/2017  Osteopenia of the hip, osteopenia of the lumbar spine.      Patient has previously had a NM parathyroid scan as well as thyroid U/S.      U/S Head and Neck 2/2016  1. Left mid lobe thyroid nodule (greater than 1 cm) with benign features.    2. No parathyroid tissue seen.     NM Parathyroid scan: 4/6/16  No localization of a parathyroid adenoma.    Urine Calcium/Cr consistent with primary hyperparathyroidism  Results for MAY JOSEPH (MRN [id number]) as of 5/9/2017 14:18   Ref. Range 2/19/2016 09:40   Calcium, Urine Latest Ref Range: 0.0 - 15.0 mg/dL 21.2 (H)   Calcium, 24H Urine Latest Ref Range: 4 - 12 mg/Hr 10   CA Urine (mg/Spec) Latest Units: mg/Spec 249   Creatinine, Ur (mg/spec) Latest Units: mg/Spec 705.0     Patient evaluated by General Surgery.  Patient declined surgery.    Patient with thyroid nodule  Evaluated by U.S. 2/2016  Left mid lobe thyroid nodule (greater than 1 cm) with benign features.    Review of Systems   Constitutional: Negative for unexpected weight change.   Eyes: Negative for visual disturbance.   Respiratory: Negative for shortness of breath.    Cardiovascular: Negative for chest pain.   Gastrointestinal: Negative for abdominal pain.   Musculoskeletal: Negative for arthralgias.   Skin: Negative for wound.   Neurological: Negative for headaches.   Hematological: Does not bruise/bleed easily.   Psychiatric/Behavioral: Negative for sleep disturbance.       Objective:      Physical Exam   Neck: No thyromegaly present.   No thyroid nodule palpated   Cardiovascular: Normal rate.    Pulmonary/Chest: Effort normal.   Abdominal: Soft.    Musculoskeletal: She exhibits no edema.   Vitals reviewed.      Assessment:       1. Thyroid nodule    2. Primary hyperparathyroidism        Plan:       Primary Hyperparathyroidism  -patient meets surgical criteria based on previous nephrolithiasis however patient would like to monitor for now  -Osteopenia  -Will order U/A and if warranted order KUB  -Has previously been evaluated by Gen Surg    Thyroid nodule  -repeat u/s    Osteopenia  -FRAX does not indicate need for treatment  -RDA Calcium and Vitamin D    A. Aura Iverson MD  PGY 5  Endocrinology

## 2017-05-11 ENCOUNTER — DOCUMENTATION ONLY (OUTPATIENT)
Dept: FAMILY MEDICINE | Facility: CLINIC | Age: 67
End: 2017-05-11

## 2017-05-11 ENCOUNTER — OFFICE VISIT (OUTPATIENT)
Dept: FAMILY MEDICINE | Facility: CLINIC | Age: 67
End: 2017-05-11
Payer: MEDICARE

## 2017-05-11 VITALS
HEIGHT: 64 IN | WEIGHT: 141.75 LBS | BODY MASS INDEX: 24.2 KG/M2 | TEMPERATURE: 99 F | DIASTOLIC BLOOD PRESSURE: 83 MMHG | SYSTOLIC BLOOD PRESSURE: 144 MMHG | HEART RATE: 65 BPM

## 2017-05-11 DIAGNOSIS — J40 BRONCHITIS: Primary | ICD-10-CM

## 2017-05-11 PROCEDURE — 1125F AMNT PAIN NOTED PAIN PRSNT: CPT | Mod: S$GLB,,, | Performed by: FAMILY MEDICINE

## 2017-05-11 PROCEDURE — 96372 THER/PROPH/DIAG INJ SC/IM: CPT | Mod: S$GLB,,, | Performed by: FAMILY MEDICINE

## 2017-05-11 PROCEDURE — 99999 PR PBB SHADOW E&M-EST. PATIENT-LVL III: CPT | Mod: PBBFAC,,, | Performed by: FAMILY MEDICINE

## 2017-05-11 PROCEDURE — 1159F MED LIST DOCD IN RCRD: CPT | Mod: S$GLB,,, | Performed by: FAMILY MEDICINE

## 2017-05-11 PROCEDURE — 99214 OFFICE O/P EST MOD 30 MIN: CPT | Mod: 25,S$GLB,, | Performed by: FAMILY MEDICINE

## 2017-05-11 PROCEDURE — 1160F RVW MEDS BY RX/DR IN RCRD: CPT | Mod: S$GLB,,, | Performed by: FAMILY MEDICINE

## 2017-05-11 RX ORDER — BETAMETHASONE SODIUM PHOSPHATE AND BETAMETHASONE ACETATE 3; 3 MG/ML; MG/ML
9 INJECTION, SUSPENSION INTRA-ARTICULAR; INTRALESIONAL; INTRAMUSCULAR; SOFT TISSUE
Status: COMPLETED | OUTPATIENT
Start: 2017-05-11 | End: 2017-05-11

## 2017-05-11 RX ADMIN — BETAMETHASONE SODIUM PHOSPHATE AND BETAMETHASONE ACETATE 9 MG: 3; 3 INJECTION, SUSPENSION INTRA-ARTICULAR; INTRALESIONAL; INTRAMUSCULAR; SOFT TISSUE at 01:05

## 2017-05-11 NOTE — PROGRESS NOTES
Subjective:       Patient ID: iTana Joseph is a 67 y.o. female.    Chief Complaint: Nasal Congestion; Sore Throat; and Chest Pain    Cough   This is a new problem. The current episode started in the past 7 days. The problem has been gradually worsening. The problem occurs every few minutes. The cough is productive of purulent sputum. Associated symptoms include ear congestion, a fever, nasal congestion and a sore throat. Pertinent negatives include no chest pain, rash or shortness of breath. Treatments tried: Z sofya - on day 3.     Review of Systems   Constitutional: Positive for fever.   HENT: Positive for sore throat.    Respiratory: Positive for cough. Negative for shortness of breath.    Cardiovascular: Negative for chest pain.   Gastrointestinal: Negative for abdominal pain and nausea.   Skin: Negative for rash.   All other systems reviewed and are negative.      Objective:      Physical Exam   Constitutional: She appears well-developed. No distress.   HENT:   Right Ear: Tympanic membrane is not erythematous.   Left Ear: Tympanic membrane is not erythematous.   Nose: Mucosal edema present. Right sinus exhibits no maxillary sinus tenderness. Left sinus exhibits no maxillary sinus tenderness.   Mouth/Throat: Posterior oropharyngeal erythema present.   Neck: Neck supple.   Cardiovascular: Normal rate and regular rhythm.    No murmur heard.  Pulmonary/Chest: Effort normal and breath sounds normal. She has no wheezes. She has no rales.   Lymphadenopathy:     She has no cervical adenopathy.       Assessment:       1. Bronchitis        Plan:         Tiana was seen today for nasal congestion, sore throat and chest pain.    Diagnoses and all orders for this visit:    Bronchitis  -     betamethasone acetate-betamethasone sodium phosphate injection 9 mg; Inject 1.5 mLs (9 mg total) into the muscle one time.    Complete Zpak

## 2017-05-11 NOTE — PROGRESS NOTES
2 patient identifiers used ( name &  ).  Administered 9 mg Celestone LUOQG IM.  Patient tolerated well.  No bleeding at insertion site noted.  Pain scale 0/10.  Allergies reviewed.    Aseptic technique maintained

## 2017-05-11 NOTE — PROGRESS NOTES
Pre-Visit Chart Review  For Appointment Scheduled on 05/11/2017    Health Maintenance Due   Topic Date Due    TETANUS VACCINE  01/03/1968

## 2017-05-11 NOTE — MR AVS SNAPSHOT
[facility]  [address] [address]  Phone: [phone]  Fax: [phone]                  Tiana Joseph   2017 1:20 PM   Office Visit    Description:  Female : 1950   Provider:  Jonathan Childers MD   Department:  [facility]           Reason for Visit     Nasal Congestion     Sore Throat     Chest Pain           Diagnoses this Visit        Comments    Bronchitis    -  Primary            To Do List           Future Appointments        Provider Department Dept Phone    2017 8:15 AM [facility]INOLOGY [facility]  [phone]      Goals (5 Years of Data)     None      [facility] On [facility]     [facility] On Call Nurse Care Line -  Assistance  Unless otherwise directed by your provider, please contact [facility], our nurse care line that is available for  assistance.     Registered nurses in the [facility] On Call Center provide: appointment scheduling, clinical advisement, health education, and other advisory services.  Call: [phone] (toll free)               Medications           Message regarding Medications     Verify the changes and/or additions to your medication regime listed below are the same as discussed with your clinician today.  If any of these changes or additions are incorrect, please notify your healthcare provider.        These medications were administered today        Dose Freq    betamethasone acetate-betamethasone sodium phosphate injection 9 mg 9 mg Clinic/HOD 1 time    Sig: Inject 1.5 mLs (9 mg total) into the muscle one time.    Class: Normal    Route: Intramuscular           Verify that the below list of medications is an accurate representation of the medications you are currently taking.  If none reported, the list may be blank. If incorrect, please contact your healthcare provider. Carry this list with you in case of emergency.           Current Medications     amino acids-minerals (A/G PRO) Tab  "Take 2 tablets by mouth 3 (three) times daily.    aspirin (ECOTRIN) 81 MG EC tablet Take 81 mg by mouth once daily.    biotin 10,000 mcg TbDL Take 1 tablet by mouth once daily.    CALCIUM CARBONATE/VITAMIN D3 (CALCIUM 500 + D ORAL) Take by mouth. 1 Capsule Oral Every evening    cetirizine (ZYRTEC) 5 MG tablet Take 5 mg by mouth daily as needed.     coenzyme Q10-vitamin E (COQ10 ) 100-100 mg-unit Cap Take by mouth. 1 Capsule Oral Every day    fish oil-dha-epa 1,200-144-216 mg Cap Take by mouth. 1 Capsule Oral Every day    lifitegrast 5 % Dpet Apply 1 drop to eye 2 (two) times daily.    minoxidil (ROGAINE) 5 % Foam Apply topically once daily.    multivitamin (THERAGRAN) per tablet Take by mouth. 1 Tablet Oral Every day           Clinical Reference Information           Your Vitals Were     BP Pulse Temp    144/83 (BP Location: Right arm, Patient Position: Sitting, BP Method: Automatic) 65 98.7 °F (37.1 °C) (Oral)    Height Weight BMI    5' 3.5" (1.613 m) 64.3 kg (141 lb 12.1 oz) 24.72 kg/m2      Blood Pressure          Most Recent Value    BP  (!)  144/83      Allergies as of 5/11/2017     Sulfa (Sulfonamide Antibiotics)      Immunizations Administered on Date of Encounter - 5/11/2017     None      Administrations This Visit     betamethasone acetate-betamethasone sodium phosphate injection 9 mg     Admin Date Action Dose Route Administered By             05/11/2017 Given 9 mg Intramuscular Luz Carrington LPN                      Language Assistance Services     ATTENTION: Language assistance services are available, free of charge. Please call [phone].      ATENCIÓN: Si habla español, tiene a huffman disposición servicios gratuitos de asistencia lingüística. Llame al [phone].     [facility] Ý: N?u b?n nói Ti?ng Vi?t, có các d?ch v? h? tr? ngôn ng? mi?n phí dành cho b?n. G?i s? [phone].         [facility] complies with applicable Federal civil rights laws and does not discriminate on " the basis of race, color, national origin, age, disability, or sex.

## 2017-05-22 ENCOUNTER — PATIENT MESSAGE (OUTPATIENT)
Dept: ENDOCRINOLOGY | Facility: CLINIC | Age: 67
End: 2017-05-22

## 2017-05-27 NOTE — PROGRESS NOTES
I have personally taken the history and examined this patient and agree with the resident's or fellow's note as stated above   Patient meets indications for parathyroid adenoma removal but she wants to wait.    Gordo Torres MD, FACE

## 2017-06-08 ENCOUNTER — TELEPHONE (OUTPATIENT)
Dept: ENDOCRINOLOGY | Facility: CLINIC | Age: 67
End: 2017-06-08

## 2017-06-08 ENCOUNTER — PATIENT MESSAGE (OUTPATIENT)
Dept: ENDOCRINOLOGY | Facility: CLINIC | Age: 67
End: 2017-06-08

## 2017-06-08 DIAGNOSIS — E21.0 PRIMARY HYPERPARATHYROIDISM: Primary | ICD-10-CM

## 2017-06-09 ENCOUNTER — HOSPITAL ENCOUNTER (OUTPATIENT)
Dept: ENDOCRINOLOGY | Facility: CLINIC | Age: 67
Discharge: HOME | End: 2017-06-09
Attending: INTERNAL MEDICINE
Payer: MEDICARE

## 2017-06-09 DIAGNOSIS — E04.1 THYROID NODULE: ICD-10-CM

## 2017-06-09 PROCEDURE — 76536 US EXAM OF HEAD AND NECK: CPT | Mod: S$GLB,,, | Performed by: INTERNAL MEDICINE

## 2017-06-12 ENCOUNTER — TELEPHONE (OUTPATIENT)
Dept: ENDOCRINOLOGY | Facility: HOSPITAL | Age: 67
End: 2017-06-12

## 2017-06-12 DIAGNOSIS — E04.1 THYROID NODULE: Primary | ICD-10-CM

## 2017-06-12 DIAGNOSIS — E21.0 PRIMARY HYPERPARATHYROIDISM: ICD-10-CM

## 2017-06-12 NOTE — TELEPHONE ENCOUNTER
Thyroid nodule on U/S meets criteria for FNA.  Will schedule.  Patient with Primary Hyperparathyroidism.  Will schedule NM scan to discern whether nodule is thyroid in origin or parathyroid gland.    LEYLA Iverson MD  PGY 5  Endocrinology

## 2017-06-14 NOTE — TELEPHONE ENCOUNTER
Please change FNA order to department imaging. These tests can not be done on the same day. Dr. Torres said better to do FNA first.

## 2017-06-15 NOTE — TELEPHONE ENCOUNTER
----- Message from Sabrina Nicole sent at 6/15/2017 11:11 AM CDT -----  Contact: Pt: [phone]  Pt is returning a missed call and she can be reached at [phone].  Pt stated that the appt for 7/10/17 @ 145 works for her.    Thanks

## 2017-06-15 NOTE — TELEPHONE ENCOUNTER
----- Message from Tara Brooke sent at 6/15/2017  8:15 AM CDT -----  Contact: Self [phone]  Pt missed your call - the pt can be reached at [phone].

## 2017-06-16 ENCOUNTER — TELEPHONE (OUTPATIENT)
Dept: ENDOCRINOLOGY | Facility: HOSPITAL | Age: 67
End: 2017-06-16

## 2017-06-16 DIAGNOSIS — E04.1 THYROID NODULE: Primary | ICD-10-CM

## 2017-07-10 ENCOUNTER — HOSPITAL ENCOUNTER (OUTPATIENT)
Dept: ENDOCRINOLOGY | Facility: CLINIC | Age: 67
Discharge: HOME | End: 2017-07-10
Payer: MEDICARE

## 2017-07-10 DIAGNOSIS — E04.2 MULTINODULAR NON-TOXIC GOITER: Primary | ICD-10-CM

## 2017-07-10 PROCEDURE — 76942 ECHO GUIDE FOR BIOPSY: CPT | Mod: S$GLB,,, | Performed by: INTERNAL MEDICINE

## 2017-07-10 PROCEDURE — 88173 CYTOPATH EVAL FNA REPORT: CPT | Performed by: PATHOLOGY

## 2017-07-10 PROCEDURE — 88173 CYTOPATH EVAL FNA REPORT: CPT | Mod: 26,,, | Performed by: PATHOLOGY

## 2017-07-10 PROCEDURE — 10022 US FINE NEEDLE ASPIRATION WITH IMAGING: CPT | Mod: S$GLB,,, | Performed by: INTERNAL MEDICINE

## 2017-07-17 ENCOUNTER — TELEPHONE (OUTPATIENT)
Dept: ENDOCRINOLOGY | Facility: CLINIC | Age: 67
End: 2017-07-17

## 2017-11-24 ENCOUNTER — TELEPHONE (OUTPATIENT)
Dept: HEMATOLOGY/ONCOLOGY | Facility: CLINIC | Age: 67
End: 2017-11-24

## 2017-11-24 NOTE — TELEPHONE ENCOUNTER
Returned call to pt.   Informed pt that her yearly f/u not due until March and that March schedule not open yet.   Pt stated she will call back in February.         ----- Message from Tiana Bruce sent at 11/24/2017  9:39 AM CST -----  Contact: self  Patient need to speak with nurse.   Patient need to schedule yearly check up.   Please call pt at [phone]

## 2018-01-24 ENCOUNTER — TELEPHONE (OUTPATIENT)
Dept: HEMATOLOGY/ONCOLOGY | Facility: CLINIC | Age: 68
End: 2018-01-24

## 2018-01-24 NOTE — TELEPHONE ENCOUNTER
----- Message from Meg Camejo sent at 1/24/2018  9:49 AM CST -----  Contact: Pt   That's a Friday date and time.    ----- Message -----  From: Yfn Gates  Sent: 1/24/2018   9:28 AM  To: [facility] Staff        ----- Message -----  From: Turner Conley  Sent: 1/23/2018   3:31 PM  To: Yfn Gates    Will like to schedule yearly appt with dr barreto on 3/9 @ 3:45       Contact::[phone]

## 2018-01-24 NOTE — TELEPHONE ENCOUNTER
Returned call to pt.  Pt unavailable.   Left message for pt to return call.   Callback number provided.         ----- Message from Yfn Gates sent at 1/24/2018 11:00 AM CST -----  Contact: Pt   Alondra, can you please call pt. About labs she wish to have outside of [facility]. Thanks   ----- Message -----  From: Meg Camejo  Sent: 1/24/2018   9:49 AM  To: Yfn Gates    That's a Friday date and time.    ----- Message -----  From: Yfn Gates  Sent: 1/24/2018   9:28 AM  To: [facility] Staff        ----- Message -----  From: Turner Conley  Sent: 1/23/2018   3:31 PM  To: Yfn Gates    Will like to schedule yearly appt with dr barreto on 3/9 @ 3:45       Contact::[phone]

## 2018-01-29 ENCOUNTER — DOCUMENTATION ONLY (OUTPATIENT)
Dept: FAMILY MEDICINE | Facility: CLINIC | Age: 68
End: 2018-01-29

## 2018-01-29 NOTE — PROGRESS NOTES
Pre-Visit Chart Review  For Appointment Scheduled on 2/2/18.    Health Maintenance Due   Topic Date Due    TETANUS VACCINE  01/03/1968    Influenza Vaccine  08/01/2017    Pneumococcal (65+) (2 of 2 - PPSV23) 01/04/2018

## 2018-02-02 ENCOUNTER — OFFICE VISIT (OUTPATIENT)
Dept: FAMILY MEDICINE | Facility: CLINIC | Age: 68
End: 2018-02-02
Payer: MEDICARE

## 2018-02-02 VITALS
WEIGHT: 132.5 LBS | SYSTOLIC BLOOD PRESSURE: 134 MMHG | TEMPERATURE: 99 F | HEIGHT: 64 IN | HEART RATE: 58 BPM | BODY MASS INDEX: 22.62 KG/M2 | DIASTOLIC BLOOD PRESSURE: 80 MMHG

## 2018-02-02 DIAGNOSIS — E04.1 THYROID NODULE: ICD-10-CM

## 2018-02-02 DIAGNOSIS — M85.80 OSTEOPENIA, UNSPECIFIED LOCATION: ICD-10-CM

## 2018-02-02 DIAGNOSIS — R79.9 ABNORMAL FINDING OF BLOOD CHEMISTRY: ICD-10-CM

## 2018-02-02 DIAGNOSIS — E21.0 PRIMARY HYPERPARATHYROIDISM: ICD-10-CM

## 2018-02-02 DIAGNOSIS — Z23 IMMUNIZATION DUE: ICD-10-CM

## 2018-02-02 DIAGNOSIS — Z00.00 ROUTINE MEDICAL EXAM: Primary | ICD-10-CM

## 2018-02-02 PROCEDURE — G0009 ADMIN PNEUMOCOCCAL VACCINE: HCPCS | Mod: S$GLB,,, | Performed by: FAMILY MEDICINE

## 2018-02-02 PROCEDURE — 99499 UNLISTED E&M SERVICE: CPT | Mod: S$GLB,,, | Performed by: FAMILY MEDICINE

## 2018-02-02 PROCEDURE — 99397 PER PM REEVAL EST PAT 65+ YR: CPT | Mod: 25,S$GLB,, | Performed by: FAMILY MEDICINE

## 2018-02-02 PROCEDURE — 90732 PPSV23 VACC 2 YRS+ SUBQ/IM: CPT | Mod: S$GLB,,, | Performed by: FAMILY MEDICINE

## 2018-02-02 PROCEDURE — 99999 PR PBB SHADOW E&M-EST. PATIENT-LVL IV: CPT | Mod: PBBFAC,,, | Performed by: FAMILY MEDICINE

## 2018-02-02 NOTE — PROGRESS NOTES
CHIEF COMPLAINT:  Routine medical exam      HISTORY OF PRESENT ILLNESS:  Tiana Joseph is a 68 y.o. female who presents to clinic  for a routine medical exam. She has no specific concerns. She follows up with endocrinology, oncology but requests referral to endocrinology in [place]. She is due for screening lab work. She is due for her pneumovax. She had an influenza vaccine. She follows up with her dentist, optometrist annually. She will be due for a colonoscopy at the end of this year.       REVIEW OF SYSTEMS:  The patient denies any fever, chills, night sweats, headaches, vision changes, difficulty speaking or swallowing, decreased hearing, weight loss, weight gain, chest pain, palpitations, shortness of breath, cough, nausea, vomiting, abdominal pain, dysuria, diarrhea, constipation, hematuria, hematochezia, melena, changes in her hair, skin, nails, numbness or weakness in her extremities, erythema, pain or swelling over any of her joints, myalgia, swollen glands, easy bruising, fatigue, edema, symptoms of anxiety or depression. She denies any vaginal discharge, breast masses, nipple discharge, change in the skin overlying her breasts.      MEDICATIONS:   Reviewed and/or reconciled in EPIC    ALLERGIES:  Reviewed and/or reconciled in [facility]    PAST MEDICAL/SURGICAL HISTORY:   Past Medical History:   Diagnosis Date    Breast cancer 2000    Depression     Nephrolithiasis 2/5/2015    Osteopenia       Past Surgical History:   Procedure Laterality Date    ADENOIDECTOMY      BILATERAL OOPHORECTOMY      benign    BREAST LUMPECTOMY  2000    Right breast    BREAST RECONSTRUCTION Bilateral 2013    Femoral hernia  2010    right side repair    HEMORRHOID SURGERY      HYSTERECTOMY  1998    KLESEY, fibroid    MASTECTOMY  2013     bilateral     TONSILLECTOMY         FAMILY HISTORY:    Family History   Problem Relation Age of Onset    Cancer Mother      breast    Breast cancer Mother      Breast cancer-75yrs     Hypertension Father     Heart disease Father 56     MI    Cancer Maternal Aunt      ovarian    Ovarian cancer Maternal Aunt     Cancer Maternal Grandfather      testicular / prostate    Stroke Paternal Grandmother     Breast cancer Maternal Grandmother      Breast cancer-42yrs    Parkinsonism Paternal Grandfather     Breast cancer Other      breast cancer in maternal great greatgrandmother    Rheum arthritis Sister     Skin cancer Brother     Hyperlipidemia Son      As a child    No Known Problems Daughter     No Known Problems Daughter     No Known Problems Sister     No Known Problems Brother     Amblyopia Neg Hx     Blindness Neg Hx     Cataracts Neg Hx     Diabetes Neg Hx     Glaucoma Neg Hx     Macular degeneration Neg Hx     Retinal detachment Neg Hx     Strabismus Neg Hx     Thyroid disease Neg Hx     Colon cancer Neg Hx        SOCIAL HISTORY:    Social History     Social History    Marital status:      Spouse name: N/A    Number of children: N/A    Years of education: N/A     Occupational History    Not on file.     Social History Main Topics    Smoking status: Never Smoker    Smokeless tobacco: Never Used    Alcohol use 0.6 oz/week     1 Glasses of wine per week      Comment: Occasionally    Drug use: No    Sexual activity: Yes     Partners: Male     Birth control/ protection: Post-menopausal      Comment:      Other Topics Concern    Not on file     Social History Narrative    No narrative on file     She eats a healthy diet, goes to [employer], does yoga and line and ballroom dances for exercises.     PHYSICAL EXAM:  VITAL SIGNS:   There were no vitals filed for this visit.  GENERAL:  Patient appears well nourished, sitting on exam table, in no acute distress.  HEENT:  Atraumatic, normocephalic, PERRLA, EOMI, no conjunctival injection, sclerae are anicteric, normal external auditory canals,TMs clear b/l, gross hearing intact to whisper, MMM, no  oropharygneal erythema or exudate.  NECK:  Supple, normal ROM, trachea is midline , no supraclavicular or cervical LAD or masses palpated.  Thyroid gland not palpable.  CARDIOVASCULAR:  RRR, normal S1 and S2, no m/r/g.  RESPIRATORY:  CTA b/l, no wheezes, rhonchi, rales.  No increased work of breathing, no  use of accessory muscles.  ABDOMEN:  Soft, nontender, nondistended, normoactive bowel sounds in all four quadrants, no rebound or guarding, no HSM or masses palpated.  Normal percussion.  EXTREMITIES:  2+ DP pulses b/l, no edema.  SKIN:  Warm, no lesions on exposed skin.  NEUROMUSCULAR:  Cranial nerves II-XII grossly intact.  Strength is 4+/5 over upper and lower extremity flexors/extensors b/l, 2+ biceps and patellar reflexes b/l. No clubbing or cyanosis of digits/nails.  Steady gait.  PSYCH:  Patient is alert and oriented to person, time, place. They are appropriately dressed and groomed. There is normal eye contact. Rate and tone of speech is normal. Normal insight, judgement. Normal thought content and process.     LABORATORY/IMAGING STUDIES: pending    ASSESSMENT/PLAN: This is a 68 y.o. female who presents to clinic for routine medical exam  1. Routine medical exam: We will check a screening CBC, CMP, TSH, fasting lipid panel. Patient will be notified of these results and the need for any further evaluation and treatment.  She will follow up with her dentist/optometrist as scheduled. We discussed the importance of increasing her exercise, continuing with a healthy, well-balanced diet.   2. Osteopenia: vitamin D level   3. Thyroid nodule, hyperparathyroidism: TSH, refer to endocrinology in [place].   4. Immunization due: will receive pneumovax in clinic today.    Tena Blanco MD

## 2018-02-07 DIAGNOSIS — D05.10 DUCTAL CARCINOMA IN SITU (DCIS) OF BREAST, UNSPECIFIED LATERALITY: Primary | ICD-10-CM

## 2018-02-20 ENCOUNTER — TELEPHONE (OUTPATIENT)
Dept: HEMATOLOGY/ONCOLOGY | Facility: CLINIC | Age: 68
End: 2018-02-20

## 2018-02-20 NOTE — TELEPHONE ENCOUNTER
Returned call to pt.   Pt requesting lab orders to be mailed so she can get done at an outside facility.   Informed pt would mail orders- verified correct address.   Pt verbalized understanding.       ----- Message from Soila Fang sent at 2/20/2018  8:23 AM CST -----  Contact: Pt   Pt Called to request Lab Paper work mailed to her before her Appt 3/9   Call [phone]  Thank You  NATALY Fang

## 2018-02-21 ENCOUNTER — PES CALL (OUTPATIENT)
Dept: ADMINISTRATIVE | Facility: CLINIC | Age: 68
End: 2018-02-21

## 2018-03-09 ENCOUNTER — OFFICE VISIT (OUTPATIENT)
Dept: HEMATOLOGY/ONCOLOGY | Facility: CLINIC | Age: 68
End: 2018-03-09
Payer: MEDICARE

## 2018-03-09 VITALS
TEMPERATURE: 98 F | WEIGHT: 133.63 LBS | SYSTOLIC BLOOD PRESSURE: 132 MMHG | HEART RATE: 59 BPM | RESPIRATION RATE: 20 BRPM | DIASTOLIC BLOOD PRESSURE: 60 MMHG | OXYGEN SATURATION: 98 % | BODY MASS INDEX: 23.29 KG/M2

## 2018-03-09 DIAGNOSIS — Z85.3 HISTORY OF BREAST CANCER: Primary | ICD-10-CM

## 2018-03-09 PROCEDURE — 99214 OFFICE O/P EST MOD 30 MIN: CPT | Mod: S$GLB,,, | Performed by: INTERNAL MEDICINE

## 2018-03-09 PROCEDURE — 99999 PR PBB SHADOW E&M-EST. PATIENT-LVL III: CPT | Mod: PBBFAC,,, | Performed by: INTERNAL MEDICINE

## 2018-03-09 NOTE — PROGRESS NOTES
Subjective:       Patient ID: Tiana Joseph is a 68 y.o. female.    Chief Complaint: Follow-up    HPI     Returns for follow-up of her history of breast cancer.  Has overall felt well.  In the interval- remains active.     Reports exercising 3-4 x per week (at the gym, yoga, dancing)  Cyst found on thyroid by endocrinology- did undergo a biopsy which was negative     Oncology History:  Diagnosed in 2002 with breast cancer (intracystic papillary)- very small, discovered at [facility] by mammogram, Dr. Cosby wanted re-read on pathology and underwent lumpectomy, required re-excision and XRT.  Negative BRCA performed for strong family history (mother breast ca, maternal gm breast ca, maternal aunt- ovarian ca)  Received no adjuvant therapy     In December 2012- she had an abnormal left mammogram, bx +DCIS,, opted for  Bilateral mastectomy 1/31/13.  Took Evista x 1-2 months- leg cramps- stopped after discussing with Dr. Cosby due to side effects    Review of Systems   Constitutional: Negative for activity change, appetite change, chills, fatigue, fever and unexpected weight change.   HENT: Negative for congestion, ear pain, hearing loss, mouth sores, nosebleeds, postnasal drip, rhinorrhea, sinus pressure and sore throat.    Eyes: Negative for visual disturbance.   Respiratory: Negative for cough and shortness of breath.    Cardiovascular: Negative for chest pain, palpitations and leg swelling.   Gastrointestinal: Negative for abdominal distention, abdominal pain, blood in stool, constipation, nausea and vomiting.   Genitourinary: Negative for difficulty urinating, dysuria, frequency, pelvic pain and urgency.   Musculoskeletal: Negative for arthralgias, back pain, gait problem, joint swelling and myalgias.   Skin: Negative for color change, pallor, rash and wound.   Neurological: Negative for dizziness, weakness, light-headedness, numbness and headaches.   Psychiatric/Behavioral: Negative for dysphoric mood. The  patient is not nervous/anxious.        Objective:      Physical Exam   Constitutional: She is oriented to person, place, and time. She appears well-developed and well-nourished. No distress.   Presents alone   HENT:   Head: Normocephalic and atraumatic.   Right Ear: External ear normal.   Left Ear: External ear normal.   Nose: Nose normal.   Mouth/Throat: Oropharynx is clear and moist. No oropharyngeal exudate.   Eyes: Conjunctivae and EOM are normal. Pupils are equal, round, and reactive to light. Right eye exhibits no discharge. Left eye exhibits no discharge. No scleral icterus. Right pupil is round and reactive. Left pupil is round and reactive.   Neck: Normal range of motion. Neck supple. No JVD present. No tracheal deviation present. No thyromegaly present.   Cardiovascular: Normal rate, regular rhythm, normal heart sounds and intact distal pulses.  Exam reveals no gallop and no friction rub.    No murmur heard.  Pulmonary/Chest: Effort normal and breath sounds normal. No respiratory distress. She has no wheezes. She has no rales.   Abdominal: Soft. Bowel sounds are normal. She exhibits no distension and no mass. There is no hepatosplenomegaly. There is no tenderness. There is no rebound and no guarding.   Musculoskeletal: Normal range of motion. She exhibits no edema or tenderness.   Lymphadenopathy:        Head (right side): No submandibular adenopathy present.        Head (left side): No submandibular adenopathy present.     She has no cervical adenopathy.        Right cervical: No superficial cervical, no deep cervical and no posterior cervical adenopathy present.       Left cervical: No superficial cervical, no deep cervical and no posterior cervical adenopathy present.        Right: No inguinal and no supraclavicular adenopathy present.        Left: No inguinal and no supraclavicular adenopathy present.   Neurological: She is alert and oriented to person, place, and time. She has normal strength. No  cranial nerve deficit or sensory deficit. She exhibits normal muscle tone. Coordination normal.   Skin: Skin is warm and dry. No bruising, no lesion, no petechiae and no rash noted. She is not diaphoretic. No erythema. No pallor.   Psychiatric: She has a normal mood and affect. Her behavior is normal. Judgment and thought content normal. Her mood appears not anxious. She does not exhibit a depressed mood.   Nursing note and vitals reviewed.    Labs- reviewed  Assessment:       1. History of breast cancer        Plan:     1. USHA  Outside labs reviewed and scanned  RTC 1 year  Knows to call for any issues.

## 2018-04-09 ENCOUNTER — TELEPHONE (OUTPATIENT)
Dept: FAMILY MEDICINE | Facility: CLINIC | Age: 68
End: 2018-04-09

## 2018-04-09 ENCOUNTER — OFFICE VISIT (OUTPATIENT)
Dept: FAMILY MEDICINE | Facility: CLINIC | Age: 68
End: 2018-04-09
Payer: MEDICARE

## 2018-04-09 VITALS — HEIGHT: 64 IN | BODY MASS INDEX: 22.74 KG/M2 | WEIGHT: 133.19 LBS

## 2018-04-09 DIAGNOSIS — I70.0 AORTIC ATHEROSCLEROSIS: ICD-10-CM

## 2018-04-09 DIAGNOSIS — M85.80 OSTEOPENIA, UNSPECIFIED LOCATION: ICD-10-CM

## 2018-04-09 DIAGNOSIS — E21.0 PRIMARY HYPERPARATHYROIDISM: ICD-10-CM

## 2018-04-09 DIAGNOSIS — Z00.00 ENCOUNTER FOR PREVENTIVE HEALTH EXAMINATION: Primary | ICD-10-CM

## 2018-04-09 DIAGNOSIS — Z01.419 WELL WOMAN EXAM: Primary | ICD-10-CM

## 2018-04-09 DIAGNOSIS — E04.1 THYROID NODULE: ICD-10-CM

## 2018-04-09 PROCEDURE — 99999 PR PBB SHADOW E&M-EST. PATIENT-LVL IV: CPT | Mod: PBBFAC,,, | Performed by: NURSE PRACTITIONER

## 2018-04-09 PROCEDURE — G0439 PPPS, SUBSEQ VISIT: HCPCS | Mod: S$GLB,,, | Performed by: NURSE PRACTITIONER

## 2018-04-09 PROCEDURE — 99499 UNLISTED E&M SERVICE: CPT | Mod: S$PBB,,, | Performed by: NURSE PRACTITIONER

## 2018-04-09 NOTE — Clinical Note
Primary Care Providers: Tena Blanco MD, MD (General)  Your patient was seen today for a HRA visit. Gap(s) in care (HEDIS gaps) have been identified during this visit that require additional testing and possible follow up.  No orders of the defined types were placed in this encounter.   These orders were placed using [facility] approved protocol and any results will be forwarded to your office for appropriate follow up. I have included a copy of my visit note; please review the note and feel free to contact me with any questions.   Thank you for allowing me to participate in the care of your patients. Juana Hahn NP

## 2018-04-09 NOTE — PROGRESS NOTES
I offered to discuss end of life issues, including information on how to make advance directives that the patient could use to name someone who would make medical decisions on their behalf if they became too ill to make themselves.    x___Patient declined  __Patient is interested, I provided paper work and offered to discuss.

## 2018-04-09 NOTE — PATIENT INSTRUCTIONS
Counseling and Referral of Other Preventative  (Italic type indicates deductible and co-insurance are waived)    Patient Name: Tiana Joseph  Today's Date: 4/9/2018    Health Maintenance       Date Due Completion Date    TETANUS VACCINE 05/11/2018 (Originally [birth year]3/1968) ---    High Dose Statin 04/18/2019 (Originally 1/3/1971) ---    Colonoscopy 12/17/2018 12/17/2008    DEXA SCAN 01/10/2019 1/10/2017    Lipid Panel 01/13/2022 1/13/2017        No orders of the defined types were placed in this encounter.    The following information is provided to all patients.  This information is to help you find resources for any of the problems found today that may be affecting your health:                Living healthy guide: www.[facility].[place].gov      Understanding Diabetes: www.diabetes.org      Eating healthy: www.cdc.gov/healthyweight      [facility] home safety checklist: www.cdc.gov/steadi/patient.html      Agency on Aging: www.goea.[place].gov      Alcoholics anonymous (AA): www.aa.org      Physical Activity: www.yunior.nih.gov/jw6plwb      Tobacco use: www.quitwithusla.org

## 2018-04-12 NOTE — PROGRESS NOTES
"Tiana Joseph presented for a  Medicare AWV and comprehensive Health Risk Assessment today. The following components were reviewed and updated:    · Medical history  · Family History  · Social history  · Allergies and Current Medications  · Health Risk Assessment  · Health Maintenance  · Care Team     ** See Completed Assessments for Annual Wellness Visit within the encounter summary.**       The following assessments were completed:  · Living Situation  · CAGE  · Depression Screening  · Timed Get Up and Go  · Whisper Test  · Cognitive Function Screening  · Nutrition Screening  · ADL Screening  · PAQ Screening    Vitals:    04/09/18 1412   Weight: 60.4 kg (133 lb 2.5 oz)   Height: 5' 3.5" (1.613 m)     Body mass index is 23.22 kg/m².  Physical Exam   Constitutional: She is oriented to person, place, and time. She appears well-developed and well-nourished.   HENT:   Head: Normocephalic.   Eyes: Pupils are equal, round, and reactive to light.   Neck: Normal range of motion.   Cardiovascular: Normal rate, regular rhythm and normal heart sounds.    No murmur heard.  Pulmonary/Chest: Effort normal and breath sounds normal.   Abdominal: Soft. Bowel sounds are normal. She exhibits no mass.   Musculoskeletal: Normal range of motion. She exhibits no edema.   Neurological: She is alert and oriented to person, place, and time. She exhibits normal muscle tone.   Skin: Skin is warm and dry.   Psychiatric: She has a normal mood and affect. Her behavior is normal.   Nursing note and vitals reviewed.            Diagnoses and health risks identified today and associated recommendations/orders:    Encounter for preventive health examination    Aortic atherosclerosis  Comments:  stable; continue to monitor    Primary hyperparathyroidism  Comments:  stable; continue to monitor    Thyroid nodule  Comments:  stable; continue to gia    Osteopenia, unspecified location  Comments:  stable; continue to monitor          Swapnil Montiel with a " 5-10 year written screening schedule and personal prevention plan. Recommendations were developed using the USPSTF age appropriate recommendations. Education, counseling, and referrals were provided as needed. After Visit Summary printed and given to patient which includes a list of additional screenings\tests needed.    F/u with PCP in 3-4 months    Juana Hahn NP

## 2018-05-23 ENCOUNTER — OFFICE VISIT (OUTPATIENT)
Dept: OBSTETRICS AND GYNECOLOGY | Facility: CLINIC | Age: 68
End: 2018-05-23
Payer: MEDICARE

## 2018-05-23 VITALS
HEIGHT: 63 IN | SYSTOLIC BLOOD PRESSURE: 124 MMHG | WEIGHT: 132.25 LBS | DIASTOLIC BLOOD PRESSURE: 76 MMHG | BODY MASS INDEX: 23.43 KG/M2

## 2018-05-23 DIAGNOSIS — Z90.710 S/P HYSTERECTOMY: ICD-10-CM

## 2018-05-23 DIAGNOSIS — Z85.3 HISTORY OF BREAST CANCER: ICD-10-CM

## 2018-05-23 DIAGNOSIS — Z90.13 HISTORY OF BILATERAL MASTECTOMY: ICD-10-CM

## 2018-05-23 DIAGNOSIS — N95.2 VAGINAL ATROPHY: ICD-10-CM

## 2018-05-23 DIAGNOSIS — Z01.419 ENCOUNTER FOR GYNECOLOGICAL EXAMINATION WITHOUT ABNORMAL FINDING: Primary | ICD-10-CM

## 2018-05-23 PROCEDURE — 99999 PR PBB SHADOW E&M-EST. PATIENT-LVL III: CPT | Mod: PBBFAC,,, | Performed by: OBSTETRICS & GYNECOLOGY

## 2018-05-23 PROCEDURE — 99397 PER PM REEVAL EST PAT 65+ YR: CPT | Mod: S$GLB,,, | Performed by: OBSTETRICS & GYNECOLOGY

## 2018-05-23 NOTE — PROGRESS NOTES
CC: Well woman exam    Tiana Joseph is a 68 y.o. female  presents for a well woman exam.  She has vaginal dryness.    History of breast cancer.    Past Medical History:   Diagnosis Date    Breast cancer     also in 2013    Depression     Nephrolithiasis 2015    Osteopenia      Past Surgical History:   Procedure Laterality Date    ADENOIDECTOMY      BILATERAL OOPHORECTOMY      benign    BREAST LUMPECTOMY      Right breast    BREAST RECONSTRUCTION Bilateral 2013    Femoral hernia  2010    right side repair    HEMORRHOID SURGERY      HYSTERECTOMY  1998    KELSEY, fibroid    MASTECTOMY  2013     bilateral     TONSILLECTOMY       Family History   Problem Relation Age of Onset    Cancer Mother         breast    Breast cancer Mother         Breast cancer-75yrs    Hypertension Father     Heart disease Father 56        MI    Cancer Maternal Aunt         ovarian    Ovarian cancer Maternal Aunt     Cancer Maternal Grandfather         testicular / prostate    Stroke Paternal Grandmother     Breast cancer Maternal Grandmother         Breast cancer-42yrs    Parkinsonism Paternal Grandfather     No Known Problems Sister     Skin cancer Brother     Hyperlipidemia Son         As a child    No Known Problems Daughter     No Known Problems Daughter     No Known Problems Sister     No Known Problems Brother     Amblyopia Neg Hx     Blindness Neg Hx     Cataracts Neg Hx     Diabetes Neg Hx     Glaucoma Neg Hx     Macular degeneration Neg Hx     Retinal detachment Neg Hx     Strabismus Neg Hx     Thyroid disease Neg Hx     Colon cancer Neg Hx      Social History   Substance Use Topics    Smoking status: Never Smoker    Smokeless tobacco: Never Used    Alcohol use 0.6 oz/week     1 Glasses of wine per week      Comment: Occasionally     OB History      Para Term  AB Living    3 3 3     3    SAB TAB Ectopic Multiple Live Births            3          /76 (BP  "Location: Left arm, Patient Position: Sitting, BP Method: Medium (Manual))   Ht 5' 3" (1.6 m)   Wt 60 kg (132 lb 4.4 oz)   BMI 23.43 kg/m²     ROS:  GENERAL: Denies weight gain or weight loss. Feeling well overall.   SKIN: Denies rash or lesions.   HEAD: Denies head injury or headache.   NODES: Denies enlarged lymph nodes.   CHEST: Denies chest pain or shortness of breath.   CARDIOVASCULAR: Denies palpitations or left sided chest pain.   ABDOMEN: No abdominal pain, constipation, diarrhea, nausea, vomiting or rectal bleeding.   URINARY: No frequency, dysuria, hematuria, or burning on urination.  REPRODUCTIVE: See HPI.   BREASTS: The patient performs breast self-examination and denies pain, lumps, or nipple discharge.   HEMATOLOGIC: No easy bruisability or excessive bleeding.   MUSCULOSKELETAL: Denies joint pain or swelling.   NEUROLOGIC: Denies syncope or weakness.   PSYCHIATRIC: Denies depression, anxiety or mood swings.    PE:   APPEARANCE: Well nourished, well developed, in no acute distress.  AFFECT: WNL, alert and oriented x 3.  SKIN: No acne or hirsutism.  NECK: Neck symmetric without masses or thyromegaly.  NODES: No inguinal, cervical, axillary or femoral lymph node enlargement.  CHEST: Good respiratory effort.   ABDOMEN: Soft. No tenderness or masses. No hepatosplenomegaly. No hernias.  BREASTS: Symmetrical, no skin changes or visible lesions. No palpable masses, nipple discharge bilaterally.  PELVIC: atrophic  external female genitalia without lesions. Normal hair distribution. Adequate perineal body, normal urethral meatus. Vagina atrophic without lesions or discharge. No significant cystocele or rectocele. Bimanual exam shows uterus and cervix to be surgically absent. Adnexa without masses or tenderness.  RECTAL: Rectovaginal exam confirms above with normal sphincter tone, no masses.  EXTREMITIES: No edema.      ICD-10-CM ICD-9-CM    1. Encounter for gynecological examination without abnormal finding " Z01.419 V72.31    2. S/P hysterectomy Z90.710 V88.01    3. History of breast cancer Z85.3 V10.3    4. History of bilateral mastectomy Z90.13 V45.71    5. Vaginal atrophy N95.2 627.3 prasterone, dhea, (INTRAROSA) 6.5 mg Inst           Patient was counseled today on A.C.S. Pap guidelines and recommendations for yearly pelvic exams, mammograms and monthly self breast exams; to see her PCP for other health maintenance.     Follow-up in about 2 years (around 5/23/2020).

## 2018-06-06 DIAGNOSIS — N95.2 VAGINAL ATROPHY: ICD-10-CM

## 2018-07-05 ENCOUNTER — TELEPHONE (OUTPATIENT)
Dept: ENDOCRINOLOGY | Facility: CLINIC | Age: 68
End: 2018-07-05

## 2018-07-05 NOTE — TELEPHONE ENCOUNTER
----- Message from Lucy Penn sent at 7/5/2018  2:49 PM CDT -----  Type:  Patient Returning Call    Who Called:  Patient  Who Left Message for Patient:  Not documented  Does the patient know what this is regarding?:  No  Best Call Back Number:  [phone]  Additional Information:  Patient just missed call. Phone call not documented in file.

## 2018-07-09 ENCOUNTER — OFFICE VISIT (OUTPATIENT)
Dept: ENDOCRINOLOGY | Facility: CLINIC | Age: 68
End: 2018-07-09
Payer: MEDICARE

## 2018-07-09 ENCOUNTER — HOSPITAL ENCOUNTER (OUTPATIENT)
Dept: RADIOLOGY | Facility: CLINIC | Age: 68
Discharge: HOME | End: 2018-07-09
Attending: INTERNAL MEDICINE
Payer: MEDICARE

## 2018-07-09 VITALS
SYSTOLIC BLOOD PRESSURE: 150 MMHG | DIASTOLIC BLOOD PRESSURE: 77 MMHG | RESPIRATION RATE: 18 BRPM | HEART RATE: 56 BPM | BODY MASS INDEX: 23.55 KG/M2 | WEIGHT: 132.94 LBS | HEIGHT: 63 IN | TEMPERATURE: 99 F

## 2018-07-09 DIAGNOSIS — E04.1 THYROID NODULE: ICD-10-CM

## 2018-07-09 DIAGNOSIS — E21.0 PRIMARY HYPERPARATHYROIDISM: Primary | ICD-10-CM

## 2018-07-09 DIAGNOSIS — R79.89 ABNORMAL THYROID BLOOD TEST: ICD-10-CM

## 2018-07-09 DIAGNOSIS — E04.9 GOITER: ICD-10-CM

## 2018-07-09 DIAGNOSIS — M85.80 OSTEOPENIA, UNSPECIFIED LOCATION: ICD-10-CM

## 2018-07-09 DIAGNOSIS — E06.9 THYROIDITIS: ICD-10-CM

## 2018-07-09 DIAGNOSIS — I70.0 AORTIC ATHEROSCLEROSIS: ICD-10-CM

## 2018-07-09 DIAGNOSIS — Z78.0 POSTMENOPAUSAL: ICD-10-CM

## 2018-07-09 DIAGNOSIS — L65.9 ALOPECIA: ICD-10-CM

## 2018-07-09 DIAGNOSIS — Z85.3 HISTORY OF BREAST CANCER: ICD-10-CM

## 2018-07-09 DIAGNOSIS — E07.9 THYROID DISEASE: ICD-10-CM

## 2018-07-09 PROCEDURE — 76536 US EXAM OF HEAD AND NECK: CPT | Mod: TC,PO

## 2018-07-09 PROCEDURE — 99214 OFFICE O/P EST MOD 30 MIN: CPT | Mod: S$GLB,,, | Performed by: INTERNAL MEDICINE

## 2018-07-09 PROCEDURE — 76536 US EXAM OF HEAD AND NECK: CPT | Mod: 26,,, | Performed by: RADIOLOGY

## 2018-07-09 PROCEDURE — 99499 UNLISTED E&M SERVICE: CPT | Mod: S$GLB,,, | Performed by: INTERNAL MEDICINE

## 2018-07-09 PROCEDURE — 99999 PR PBB SHADOW E&M-EST. PATIENT-LVL IV: CPT | Mod: PBBFAC,,, | Performed by: INTERNAL MEDICINE

## 2018-07-09 NOTE — PROGRESS NOTES
Subjective:      Patient ID: Tiana Joseph is a 68 y.o. female.    Chief Complaint:       68 yr old lady seen for initial care visit today on account of thyroid nodular disease, hyperparathyroidism and osteopenia.    History of Present Illness    Patient is a 68 yr old postmenopausal  lady seen for initial care visit today on account of thyroid nodular disease, hyperparathyroidsm and osteopenia. She had previously been seen and ffed by Dr Ismael bautista at [facility].  Her associated comorbidites are detailed below;  Patient Active Problem List   Diagnosis    Osteopenia    Alopecia    Thyroid nodule    Primary hyperparathyroidism    History of breast cancer    History of ductal carcinoma in situ (DCIS) of breast    Aortic atherosclerosis    Thyroid disease    Goiter    Thyroiditis    Abnormal thyroid blood test    Postmenopausal   Her most recent DEXA from 01/17 showed osteopenia for which she is vitamin D supplementation and her next  DEXA should be for ~ 01/19. Her most recent thyroid USS was from 06/17 and based on the finding of a left mid pole nodule she had an USS guided   FNAB from 07/17 which showed benign colloid nodule.  She had had a prior full work up for primary hyperparathyroidism but declined surgical management of the same.  Patients baseline [place] scores is 7 and she has never had a prior sleep study.   Patient had bilateral breast cancer for which she was treated with bilateral mastectomy.  She was -ve for BRCA1 and 2. Patient is a retired RN.       Review of Systems   Constitutional: Negative for unexpected weight change.   HENT: Negative for facial swelling and trouble swallowing.    Eyes: Negative for visual disturbance.   Respiratory: Negative for shortness of breath.    Cardiovascular: Negative for chest pain.   Gastrointestinal: Negative for abdominal pain.   Genitourinary: Negative for dysuria, frequency and menstrual problem.   Musculoskeletal: Negative for arthralgias.   Skin:  "Negative for wound.   Neurological: Negative for tremors and headaches.   Hematological: Does not bruise/bleed easily.   Psychiatric/Behavioral: Negative for sleep disturbance. The patient is not nervous/anxious.        Objective: BP (!) 150/77   Pulse (!) 56   Temp 98.6 °F (37 °C) (Oral)   Resp 18   Ht 5' 3" (1.6 m)   Wt 60.3 kg (132 lb 15 oz)   BMI 23.55 kg/m²  Body surface area is 1.64 meters squared.         Physical Exam   Constitutional: She is oriented to person, place, and time. She appears well-developed and well-nourished. No distress.   Pleasant elderly lady. Not pale, anicteric and afebrile. Well hydrated. Not in any acute distress.   HENT:   Head: Normocephalic and atraumatic.   Mouth/Throat: No oropharyngeal exudate.   Eyes: Conjunctivae and EOM are normal. Pupils are equal, round, and reactive to light. No scleral icterus.   Neck: Normal range of motion. Neck supple. No JVD present. No thyromegaly present.   No thyroid nodule palpated   Cardiovascular: Normal rate, regular rhythm and normal heart sounds.    No murmur heard.  Pulmonary/Chest: Effort normal and breath sounds normal. No stridor. No respiratory distress. She has no wheezes. She has no rales.   Abdominal: Soft. She exhibits no distension. There is no tenderness.   Musculoskeletal: She exhibits no edema.   Lymphadenopathy:     She has no cervical adenopathy.   Neurological: She is alert and oriented to person, place, and time. No cranial nerve deficit.   Skin: Skin is warm and dry. No rash noted. She is not diaphoretic. No erythema. No pallor.   Psychiatric: She has a normal mood and affect. Her behavior is normal. Judgment and thought content normal.   Vitals reviewed.      Lab Review:     No recent labs in the [facility] data repository.    Assessment:     1. Primary hyperparathyroidism  Magnesium    Phosphorus    Comprehensive metabolic panel   2. History of breast cancer  CBC auto differential   3. Aortic atherosclerosis  Lipid panel "   4. Alopecia     5. Osteopenia, unspecified location  Calcium, ionized    PTH, intact   6. Thyroid nodule  T4, free    Thyroglobulin    T3    Calcitonin    Chromogranin A    Iodine, Serum    TSH    US Soft Tissue Head Neck Thyroid   7. Thyroid disease  T4, free    Thyroglobulin    T3    Calcitonin    Chromogranin A    Iodine, Serum    TSH    Uric acid    Lipid panel   8. Goiter  TSH   9. Thyroiditis     10. Abnormal thyroid blood test     11. Postmenopausal  Calcium, ionized    PTH, intact    Lipid panel      Regarding hyperparathyroidism; being managed conservatively for now. To continue copious oral hydration; to aim at daily goal of ~ 70 fl oz daily.  Regarding thyroid nodular disease; to obtain ffup thyroid USS.  Regarding osteopenia; to continue vitamin supplementation for now. To have ffup DEXA ~ 01/19.  Regarding breast ca history; ongoing ffup with her medical oncologist as before.  Regarding scalp alopecia; to continue propecia as before.  Regarding postmenopausal status; to continue intravaginal HRT with intrarosa.      Plan:     FFup in ~ 6mths.

## 2018-07-09 NOTE — LETTER
July 9, 2018      Tena Blanco MD  [address]  [place] [address]           [place] [facility]  [address]  [place] [address]  Phone: [phone]          Patient: Tiana Joseph   MR Number: [id number]   YOB: 1950   Date of Visit: 7/9/2018       Dear Dr. Tena Blanco:    Thank you for referring Tiana Joseph to me for evaluation. Attached you will find relevant portions of my assessment and plan of care.    If you have questions, please do not hesitate to call me. I look forward to following Tiana Joseph along with you.    Sincerely,    Grabiel Brody MD    Enclosure  CC:  No Recipients    If you would like to receive this communication electronically, please contact [email or web address] or [phone] to request more information on [facility] [employer] [facility] access.    For providers and/or their staff who would like to refer a patient to [facility], please contact us through our one-stop-shop provider referral line, [facility], at [phone].    If you feel you have received this communication in error or would no longer like to receive these types of communications, please e-mail [email or web address]

## 2019-01-16 ENCOUNTER — HOSPITAL ENCOUNTER (OUTPATIENT)
Dept: RADIOLOGY | Facility: CLINIC | Age: 69
Discharge: HOME | End: 2019-01-16
Attending: PHYSICIAN ASSISTANT
Payer: MEDICARE

## 2019-01-16 ENCOUNTER — OFFICE VISIT (OUTPATIENT)
Dept: ENDOCRINOLOGY | Facility: CLINIC | Age: 69
End: 2019-01-16
Payer: MEDICARE

## 2019-01-16 VITALS
RESPIRATION RATE: 16 BRPM | HEART RATE: 61 BPM | BODY MASS INDEX: 23.55 KG/M2 | TEMPERATURE: 98 F | WEIGHT: 132.94 LBS | DIASTOLIC BLOOD PRESSURE: 84 MMHG | HEIGHT: 63 IN | SYSTOLIC BLOOD PRESSURE: 152 MMHG

## 2019-01-16 DIAGNOSIS — Z78.0 POSTMENOPAUSAL: ICD-10-CM

## 2019-01-16 DIAGNOSIS — E21.0 PRIMARY HYPERPARATHYROIDISM: Primary | ICD-10-CM

## 2019-01-16 DIAGNOSIS — E55.9 HYPOVITAMINOSIS D: ICD-10-CM

## 2019-01-16 DIAGNOSIS — E04.1 THYROID NODULE: ICD-10-CM

## 2019-01-16 DIAGNOSIS — M85.80 OSTEOPENIA, UNSPECIFIED LOCATION: ICD-10-CM

## 2019-01-16 PROCEDURE — 77080 DEXA BONE DENSITY SPINE HIP: ICD-10-PCS | Mod: 26,,, | Performed by: RADIOLOGY

## 2019-01-16 PROCEDURE — 99499 UNLISTED E&M SERVICE: CPT | Mod: HCNC,S$GLB,, | Performed by: PHYSICIAN ASSISTANT

## 2019-01-16 PROCEDURE — 99213 OFFICE O/P EST LOW 20 MIN: CPT | Mod: S$GLB,,, | Performed by: PHYSICIAN ASSISTANT

## 2019-01-16 PROCEDURE — 99999 PR PBB SHADOW E&M-EST. PATIENT-LVL IV: ICD-10-PCS | Mod: PBBFAC,,, | Performed by: PHYSICIAN ASSISTANT

## 2019-01-16 PROCEDURE — 99999 PR PBB SHADOW E&M-EST. PATIENT-LVL IV: CPT | Mod: PBBFAC,,, | Performed by: PHYSICIAN ASSISTANT

## 2019-01-16 PROCEDURE — 99213 PR OFFICE/OUTPT VISIT, EST, LEVL III, 20-29 MIN: ICD-10-PCS | Mod: S$GLB,,, | Performed by: PHYSICIAN ASSISTANT

## 2019-01-16 PROCEDURE — 77080 DXA BONE DENSITY AXIAL: CPT | Mod: TC,PO

## 2019-01-16 PROCEDURE — 77080 DXA BONE DENSITY AXIAL: CPT | Mod: 26,,, | Performed by: RADIOLOGY

## 2019-01-16 PROCEDURE — 1101F PT FALLS ASSESS-DOCD LE1/YR: CPT | Mod: CPTII,S$GLB,, | Performed by: PHYSICIAN ASSISTANT

## 2019-01-16 PROCEDURE — 1101F PR PT FALLS ASSESS DOC 0-1 FALLS W/OUT INJ PAST YR: ICD-10-PCS | Mod: CPTII,S$GLB,, | Performed by: PHYSICIAN ASSISTANT

## 2019-01-16 PROCEDURE — 99499 RISK ADDL DX/OHS AUDIT: ICD-10-PCS | Mod: HCNC,S$GLB,, | Performed by: PHYSICIAN ASSISTANT

## 2019-01-16 NOTE — PROGRESS NOTES
"CC: Hyperparathyroidism/Nodular thyroid disease and osteopenia    HPI: Tiana Joseph is a 69 y.o. female here for nodular thyroid disease along with other conditions listed in the Visit Diagnosis. Diagnosed ~ 4 months ago. No fhx of thyroid disease. Her grandson has Type 1 DM. Patient had bilateral breast cancer for which she was treated with bilateral mastectomy. She was -ve for BRCA1 and 2. Patient is a retired RN.     States she recently had a URI and took antibiotics for this last week. Reports slight dysphagia and this is related to the URI.    Thyroid u/s 7/18 shows 1.6 cm nodule in left lobe that had a benign FNA. No SOB or voice changes.     Kidney stones 1970, 1977 w/ pregnancies. She also had one in 2005. No n/v, abdominal pain or muscle weakness.     DEXA scan: 1/17 osteopenia. No fractures, steriod injections. She has had a few falls without injections. Phx of osteoporosis and took reclast for three years. Taking calcium carbonate.     No heat/cold intolerance, palpations, d/c.    PMHx, PSHx: reviewed in epic.    Social Hx: no ETOH/tobacco use.     ROS:   Constitutional: no fatigue, wt loss  Eyes: No recent visual changes  Cardiovascular: Denies current anginal symptoms  Respiratory: Denies current respiratory difficulty  Gastrointestinal: Denies recent bowel disturbances  GenitoUrinary - No dysuria  Skin: No new skin rash  Neurologic: + numbness and tingling in feet  Endocrine: no polyphagia, polydipsia or polyuria  Remainder ROS negative     BP (!) 152/84 (BP Location: Left arm, Patient Position: Sitting, BP Method: Large (Manual))   Pulse 61   Temp 98.4 °F (36.9 °C) (Oral)   Resp 16   Ht 5' 3" (1.6 m)   Wt 60.3 kg (132 lb 15 oz)   BMI 23.55 kg/m²      Personally reviewed labs below:  Lab Results   Component Value Date    TSH 1.869 07/09/2018    W5QWUXT 77 07/09/2018    FREET4 0.90 07/09/2018     Lab Results   Component Value Date    PTH 41.0 07/09/2018    CALCIUM 11.1 (H) 07/09/2018    CAION 1.41 " 07/09/2018    PHOS 3.5 07/09/2018         Chemistry        Component Value Date/Time     07/09/2018 1524    K 3.7 07/09/2018 1524     07/09/2018 1524    CO2 28 07/09/2018 1524    BUN 18 07/09/2018 1524    CREATININE 0.7 07/09/2018 1524    GLU 77 07/09/2018 1524        Component Value Date/Time    CALCIUM 11.1 (H) 07/09/2018 1524    ALKPHOS 82 07/09/2018 1524    AST 23 07/09/2018 1524    ALT 22 07/09/2018 1524    BILITOT 0.7 07/09/2018 1524    ESTGFRAFRICA >60.0 07/09/2018 1524    EGFRNONAA >60.0 07/09/2018 1524         No results found for: HGBA1C     PE:  GENERAL: elderly female (looks younger than stated age), well developed, well nourished  NECK: Supple neck, normal thyroid. No bruit  LYMPHATIC: No cervical or supraclavicular lymphadenopathy  CARDIOVASCULAR: Normal heart sounds, no pedal edema  RESPIRATORY: Normal effort, clear to auscultation bl  ABDOMEN: soft, non-tender, non-distended.  FEET: appropriate footwear.   Psych: normal mood and affect    Assessment/Plan:   1. Primary hyperparathyroidism  PTH, intact    Calcium, ionized    Comprehensive metabolic panel   2. Osteopenia, unspecified location  Comprehensive metabolic panel   3. Thyroid nodule  T4, free    T3    TSH    Thyroglobulin    Calcitonin   4. Postmenopausal  DXA Bone Density Spine And Hip   5. Hypovitaminosis D  Vitamin D     Primary hyperparathyroidism-check pth,ca. Last pth was normal.   Osteopenia-continue ca and vd. Repeat DEXA  Thyroid nodule-TFTs today. Repeat thyroid u/s 7/19  Postmenopausal-see above  Hypovitaminosis D-check vd    F/u in 6 mths

## 2019-01-16 NOTE — PROGRESS NOTES
CC: This 69 y.o. female presents for management of No chief complaint on file.    and chronic conditions pending review including HTN, HLP    HPI: was diagnosed with T2DM in   Has never been hospitalized r/t DM.  Family hx of DM:   Denies missing doses of DM medication.   hypoglycemia at home  monitoring BG at home:      Diet: Eats  Meals a day, snacks  Exercise:    CURRENT DM MEDS:  Timing prandial insulin 5-15 minutes before meals:  Vial/pen:  Uses   Glucometer type:      Standards of Care:  Eye exam:    ROS:   Gen: Appetite good, no weight gain or loss, denies fatigue and weakness.  Skin: Skin is intact and heals well, no rashes, no hair changes  Eyes: Denies visual disturbances  Resp: no SOB or HARRIS, no cough  Cardiac: No palpitations, chest pain, no edema   GI: No nausea or vomiting, diarrhea, constipation, or abdominal pain.  /GYN: No nocturia, burning or pain.   MS/Neuro: Denies numbness/ tingling in BLE; Gait steady, speech clear  Psych: Denies drug/ETOH abuse, no hx of depression.  Other systems: negative.    PE:  GENERAL: Well developed, well nourished.  PSYCH: AAOx3, appropriate mood and affect, pleasant expression, conversant, appears relaxed, well groomed.   EYES: Conjunctiva, corneas clear  NECK: Supple, trachea midline,no thyromegaly or nodules  CHEST: Resp even and unlabored, CTA bilateral.  CARDIAC: RRR, S1, S2 heard, no murmurs  ABDOMEN: Soft, non-tender, non-distended   VASCULAR: DP pulses +2/4 bilaterally, no edema.  NEURO: Gait steady  SKIN: Skin warm and dry no acanthosis nigracans.  FOOT EXAMINATION: ***  No foot deformity, corns or callus formation, Onychomycosis, nails in good condition and well trimmed, no interspace maceration or ulceration noted.  Decreased hair growth present over toes/feet.  Positive vibratory response to 128 Hz tuning fork bilaterally.  Protective sensation intact with 10 gram monofilament.  +2 dorsalis pedis and posterior pulses noted.      No results found for:  MICALBCREAT    No results found for: HGBA1C     ASSESSMENT and PLAN:      1. T2DM with hyperglycemia-   discussed DM, progression of disease, long term complications, tx options.   Discussed A1c and BG goals.   Reviewed  hypoglycemia, s/s and appropriate tx.   Instructed to monitor BG and bring meter/ log to every clinic visit.   - takes ASA, ACEi, statin    2. HTN - controlled, continue meds as previously prescribed and monitor.     3. HLP - LDL , on statin therapy, LFTs WNL       Follow-up: in 3 months with lab prior

## 2019-01-26 DIAGNOSIS — M85.80 OSTEOPENIA, UNSPECIFIED LOCATION: Primary | ICD-10-CM

## 2019-01-29 ENCOUNTER — DOCUMENTATION ONLY (OUTPATIENT)
Dept: FAMILY MEDICINE | Facility: CLINIC | Age: 69
End: 2019-01-29

## 2019-01-29 NOTE — PROGRESS NOTES
Pre-Visit Chart Review  For Appointment Scheduled on 2/18/2019    Health Maintenance Due   Topic Date Due    TETANUS VACCINE  01/03/1968    Colonoscopy  12/17/2018

## 2019-01-30 ENCOUNTER — TELEPHONE (OUTPATIENT)
Dept: ENDOCRINOLOGY | Facility: CLINIC | Age: 69
End: 2019-01-30

## 2019-01-30 NOTE — TELEPHONE ENCOUNTER
----- Message from Nidia Wan RN sent at 1/30/2019  4:07 PM CST -----  Contact: [phone]      ----- Message -----  From: Angela Montes  Sent: 1/30/2019   3:56 PM  To: [facility] Staff    Patient is returning nurse's phone call.  Please call patient back at [phone].

## 2019-02-04 ENCOUNTER — PATIENT OUTREACH (OUTPATIENT)
Dept: ADMINISTRATIVE | Facility: HOSPITAL | Age: 69
End: 2019-02-04

## 2019-02-05 ENCOUNTER — LAB VISIT (OUTPATIENT)
Dept: LAB | Facility: HOSPITAL | Age: 69
End: 2019-02-05
Payer: MEDICARE

## 2019-02-05 DIAGNOSIS — M85.80 OSTEOPENIA, UNSPECIFIED LOCATION: ICD-10-CM

## 2019-02-05 PROCEDURE — 82523 COLLAGEN CROSSLINKS: CPT | Mod: 91,HCNC

## 2019-02-05 PROCEDURE — 82523 COLLAGEN CROSSLINKS: CPT | Mod: HCNC

## 2019-02-05 PROCEDURE — 83519 RIA NONANTIBODY: CPT | Mod: HCNC

## 2019-02-05 PROCEDURE — 83937 ASSAY OF OSTEOCALCIN: CPT | Mod: HCNC

## 2019-02-05 PROCEDURE — 84075 ASSAY ALKALINE PHOSPHATASE: CPT | Mod: HCNC

## 2019-02-07 ENCOUNTER — PATIENT MESSAGE (OUTPATIENT)
Dept: ENDOCRINOLOGY | Facility: CLINIC | Age: 69
End: 2019-02-07

## 2019-02-07 LAB
COLLAGEN CTX SERPL-MCNC: 347 PG/ML
PINP SER-MCNC: 43 MCG/L

## 2019-02-08 LAB — ALP BONE SERPL-MCNC: 13.9 UG/L

## 2019-02-11 LAB — OSTEOCALCIN SERPL-MCNC: 18 NG/ML

## 2019-02-12 ENCOUNTER — TELEPHONE (OUTPATIENT)
Dept: HEMATOLOGY/ONCOLOGY | Facility: CLINIC | Age: 69
End: 2019-02-12

## 2019-02-12 LAB — NTX TELOPEPTIDE: 14.8 NM BCE

## 2019-02-18 ENCOUNTER — TELEPHONE (OUTPATIENT)
Dept: ENDOCRINOLOGY | Facility: CLINIC | Age: 69
End: 2019-02-18

## 2019-02-18 ENCOUNTER — OFFICE VISIT (OUTPATIENT)
Dept: FAMILY MEDICINE | Facility: CLINIC | Age: 69
End: 2019-02-18
Payer: MEDICARE

## 2019-02-18 VITALS
BODY MASS INDEX: 24.06 KG/M2 | SYSTOLIC BLOOD PRESSURE: 133 MMHG | TEMPERATURE: 99 F | HEIGHT: 63 IN | HEART RATE: 59 BPM | WEIGHT: 135.81 LBS | DIASTOLIC BLOOD PRESSURE: 78 MMHG

## 2019-02-18 DIAGNOSIS — Z12.11 SCREENING FOR COLON CANCER: Primary | ICD-10-CM

## 2019-02-18 PROCEDURE — 99999 PR PBB SHADOW E&M-EST. PATIENT-LVL III: CPT | Mod: PBBFAC,HCNC,, | Performed by: FAMILY MEDICINE

## 2019-02-18 PROCEDURE — 99397 PER PM REEVAL EST PAT 65+ YR: CPT | Mod: HCNC,S$GLB,, | Performed by: FAMILY MEDICINE

## 2019-02-18 PROCEDURE — 99999 PR PBB SHADOW E&M-EST. PATIENT-LVL III: ICD-10-PCS | Mod: PBBFAC,HCNC,, | Performed by: FAMILY MEDICINE

## 2019-02-18 PROCEDURE — 99397 PR PREVENTIVE VISIT,EST,65 & OVER: ICD-10-PCS | Mod: HCNC,S$GLB,, | Performed by: FAMILY MEDICINE

## 2019-02-18 RX ORDER — ACETAMINOPHEN 500 MG
1 TABLET ORAL DAILY
COMMUNITY
End: 2022-01-03 | Stop reason: ALTCHOICE

## 2019-02-18 NOTE — PROGRESS NOTES
CHIEF COMPLAINT:  Routine medical exam      HISTORY OF PRESENT ILLNESS:  Eva Joseph is a 69 y.o. female who presents to clinic for a routine medical exam. She is up to date on screening lab work. She is up to date on her immunizations.  She follows up with her dentist, optometrist annually. She is due for a screening colonoscopy and prefers to due this at main [place].       REVIEW OF SYSTEMS:  The patient denies any fever, chills, night sweats, headaches, vision changes, difficulty speaking or swallowing, decreased hearing, weight loss, weight gain, chest pain, palpitations, shortness of breath, cough, nausea, vomiting, abdominal pain, dysuria, diarrhea, constipation, hematuria, hematochezia, melena, changes in her hair, skin, nails, numbness or weakness in her extremities, erythema, pain or swelling over any of her joints, myalgia, swollen glands, easy bruising, fatigue, edema, symptoms of anxiety or depression. She denies any vaginal discharge, breast masses, nipple discharge, change in the skin overlying her breasts.      MEDICATIONS:   Reviewed and/or reconciled in EPIC    ALLERGIES:  Reviewed and/or reconciled in [facility]    PAST MEDICAL/SURGICAL HISTORY:   Past Medical History:   Diagnosis Date    Breast cancer 2000    also in 2013    Depression     Nephrolithiasis 2/5/2015    Osteopenia       Past Surgical History:   Procedure Laterality Date    ADENOIDECTOMY      BILATERAL OOPHORECTOMY      benign    BREAST LUMPECTOMY  2000    Right breast    BREAST RECONSTRUCTION Bilateral 2013    Femoral hernia  2010    right side repair    HEMORRHOID SURGERY      HYSTERECTOMY  1998    KELSEY, fibroid    MASTECTOMY  2013     bilateral     TONSILLECTOMY         FAMILY HISTORY:    Family History   Problem Relation Age of Onset    Cancer Mother         breast    Breast cancer Mother         Breast cancer-75yrs    Hypertension Father     Heart disease Father 56        MI    Cancer Maternal Aunt         ovarian  "   Ovarian cancer Maternal Aunt     Cancer Maternal Grandfather         testicular / prostate    Stroke Paternal Grandmother     Breast cancer Maternal Grandmother         Breast cancer-42yrs    Parkinsonism Paternal Grandfather     No Known Problems Sister     Skin cancer Brother     Hyperlipidemia Son         As a child    No Known Problems Daughter     No Known Problems Daughter     No Known Problems Sister     No Known Problems Brother     Amblyopia Neg Hx     Blindness Neg Hx     Cataracts Neg Hx     Diabetes Neg Hx     Glaucoma Neg Hx     Macular degeneration Neg Hx     Retinal detachment Neg Hx     Strabismus Neg Hx     Thyroid disease Neg Hx     Colon cancer Neg Hx        SOCIAL HISTORY:    Social History     Socioeconomic History    Marital status:      Spouse name: Not on file    Number of children: Not on file    Years of education: Not on file    Highest education level: Not on file   Social Needs    Financial resource strain: Not on file    Food insecurity - worry: Not on file    Food insecurity - inability: Not on file    Transportation needs - medical: Not on file    Transportation needs - non-medical: Not on file   Occupational History    Not on file   Tobacco Use    Smoking status: Never Smoker    Smokeless tobacco: Never Used   Substance and Sexual Activity    Alcohol use: Yes     Alcohol/week: 0.6 oz     Types: 1 Glasses of wine per week     Comment: Occasionally    Drug use: No    Sexual activity: Yes     Partners: Male     Birth control/protection: Post-menopausal     Comment:    Other Topics Concern    Not on file   Social History Narrative    Not on file     She eats a healthy diet, goes to [employer], does yoga and line and ballroom dances for exercises.     PHYSICAL EXAM:  VITAL SIGNS:   Vitals:    02/18/19 1617   BP: 133/78   Pulse: (!) 59   Temp: 98.7 °F (37.1 °C)   Weight: 61.6 kg (135 lb 12.9 oz)   Height: 5' 3" (1.6 m) "     GENERAL:  Patient appears well nourished, sitting on exam table, in no acute distress.  HEENT:  Atraumatic, normocephalic, PERRLA, EOMI, no conjunctival injection, sclerae are anicteric, normal external auditory canals,TMs clear b/l, gross hearing intact to whisper, MMM, no oropharygneal erythema or exudate.  NECK:  Supple, normal ROM, trachea is midline , no supraclavicular or cervical LAD or masses palpated.  Thyroid gland not palpable.  CARDIOVASCULAR:  RRR, normal S1 and S2, no m/r/g.  RESPIRATORY:  CTA b/l, no wheezes, rhonchi, rales.  No increased work of breathing, no  use of accessory muscles.  ABDOMEN:  Soft, nontender, nondistended, normoactive bowel sounds in all four quadrants, no rebound or guarding, no HSM or masses palpated.  Normal percussion.  EXTREMITIES:  2+ DP pulses b/l, no edema.  SKIN:  Warm, no lesions on exposed skin.  NEUROMUSCULAR:  Cranial nerves II-XII grossly intact.  Strength is 4+/5 over upper and lower extremity flexors/extensors b/l, 2+ biceps and patellar reflexes b/l. No clubbing or cyanosis of digits/nails.  Steady gait.  PSYCH:  Patient is alert and oriented to person, time, place. They are appropriately dressed and groomed. There is normal eye contact. Rate and tone of speech is normal. Normal insight, judgement. Normal thought content and process.     LABORATORY/IMAGING STUDIES: pending    ASSESSMENT/PLAN: This is a 69 y.o. female who presents to clinic for routine medical exam  1. Routine medical exam:  She will follow up with her dentist/optometrist as scheduled. We discussed the importance of continuing with her her exercise, continuing with a healthy, well-balanced diet.   2. Screening colon cancer: Screening colonoscopy ordered.     Tena Blanco MD

## 2019-03-25 ENCOUNTER — OFFICE VISIT (OUTPATIENT)
Dept: HEMATOLOGY/ONCOLOGY | Facility: CLINIC | Age: 69
End: 2019-03-25
Payer: MEDICARE

## 2019-03-25 VITALS
DIASTOLIC BLOOD PRESSURE: 80 MMHG | HEART RATE: 54 BPM | SYSTOLIC BLOOD PRESSURE: 158 MMHG | RESPIRATION RATE: 20 BRPM | TEMPERATURE: 98 F | WEIGHT: 136 LBS | BODY MASS INDEX: 24.1 KG/M2

## 2019-03-25 DIAGNOSIS — Z85.3 HISTORY OF BREAST CANCER: Primary | ICD-10-CM

## 2019-03-25 DIAGNOSIS — Z86.000 HISTORY OF DUCTAL CARCINOMA IN SITU (DCIS) OF BREAST: ICD-10-CM

## 2019-03-25 PROCEDURE — 1101F PT FALLS ASSESS-DOCD LE1/YR: CPT | Mod: HCNC,CPTII,S$GLB, | Performed by: INTERNAL MEDICINE

## 2019-03-25 PROCEDURE — 99999 PR PBB SHADOW E&M-EST. PATIENT-LVL III: ICD-10-PCS | Mod: PBBFAC,HCNC,, | Performed by: INTERNAL MEDICINE

## 2019-03-25 PROCEDURE — 99214 OFFICE O/P EST MOD 30 MIN: CPT | Mod: HCNC,S$GLB,, | Performed by: INTERNAL MEDICINE

## 2019-03-25 PROCEDURE — 1101F PR PT FALLS ASSESS DOC 0-1 FALLS W/OUT INJ PAST YR: ICD-10-PCS | Mod: HCNC,CPTII,S$GLB, | Performed by: INTERNAL MEDICINE

## 2019-03-25 PROCEDURE — 99999 PR PBB SHADOW E&M-EST. PATIENT-LVL III: CPT | Mod: PBBFAC,HCNC,, | Performed by: INTERNAL MEDICINE

## 2019-03-25 PROCEDURE — 99214 PR OFFICE/OUTPT VISIT, EST, LEVL IV, 30-39 MIN: ICD-10-PCS | Mod: HCNC,S$GLB,, | Performed by: INTERNAL MEDICINE

## 2019-03-25 NOTE — PROGRESS NOTES
Subjective:       Patient ID: Eva Joseph is a 69 y.o. female.    Chief Complaint: No chief complaint on file.    HPI     Returns for follow-up of her history of breast cancer.  Has overall felt well.  In the interval- remains active.     Saw Dr. Rolon last year for bilateral cataracts, likely will need surgery  She went to her PCP and was placed on extra Vit D    Reports exercising 3-4 x per week (at the gym, yoga, dancing)  Cyst found on thyroid by endocrinology- did undergo a biopsy which was negative     Oncology History:  Diagnosed in 2002 with breast cancer (intracystic papillary)- very small, discovered at [facility] by mammogram, Dr. Cosby wanted re-read on pathology and underwent lumpectomy, required re-excision and XRT.  Negative BRCA performed for strong family history (mother breast ca, maternal gm breast ca, maternal aunt- ovarian ca)  Received no adjuvant therapy     In December 2012- she had an abnormal left mammogram, bx +DCIS,, opted for  Bilateral mastectomy 1/31/13.  Took Evista x 1-2 months- leg cramps- stopped after discussing with Dr. Cosby due to side effects     Review of Systems   Constitutional: Negative for activity change, appetite change, chills, fatigue, fever and unexpected weight change.   HENT: Negative for congestion, ear pain, hearing loss, mouth sores, nosebleeds, postnasal drip, rhinorrhea, sinus pressure and sore throat.    Eyes: Negative for visual disturbance.   Respiratory: Negative for cough and shortness of breath.    Cardiovascular: Negative for chest pain, palpitations and leg swelling.   Gastrointestinal: Negative for abdominal distention, abdominal pain, blood in stool, constipation, nausea and vomiting.   Genitourinary: Negative for difficulty urinating, dysuria, frequency, pelvic pain and urgency.   Musculoskeletal: Negative for arthralgias, back pain, gait problem, joint swelling and myalgias.   Skin: Negative for color change, pallor, rash and wound.    Neurological: Negative for dizziness, weakness, light-headedness, numbness and headaches.   Psychiatric/Behavioral: Negative for dysphoric mood. The patient is not nervous/anxious.        Objective:      Physical Exam   Constitutional: She is oriented to person, place, and time. She appears well-developed and well-nourished. No distress.   Presents alone   HENT:   Head: Normocephalic and atraumatic.   Right Ear: External ear normal.   Left Ear: External ear normal.   Nose: Nose normal.   Mouth/Throat: Oropharynx is clear and moist. No oropharyngeal exudate.   Eyes: Pupils are equal, round, and reactive to light. Conjunctivae and EOM are normal. Right eye exhibits no discharge. Left eye exhibits no discharge. No scleral icterus. Right pupil is round and reactive. Left pupil is round and reactive.   Neck: Normal range of motion. Neck supple. No JVD present. No tracheal deviation present. No thyromegaly present.   Cardiovascular: Normal rate, regular rhythm, normal heart sounds and intact distal pulses. Exam reveals no gallop and no friction rub.   No murmur heard.  Pulmonary/Chest: Effort normal and breath sounds normal. No respiratory distress. She has no wheezes. She has no rales.   Abdominal: Soft. Bowel sounds are normal. She exhibits no distension and no mass. There is no hepatosplenomegaly. There is no tenderness. There is no rebound and no guarding.   Musculoskeletal: Normal range of motion. She exhibits no edema or tenderness.   Lymphadenopathy:        Head (right side): No submandibular adenopathy present.        Head (left side): No submandibular adenopathy present.     She has no cervical adenopathy.        Right cervical: No superficial cervical, no deep cervical and no posterior cervical adenopathy present.       Left cervical: No superficial cervical, no deep cervical and no posterior cervical adenopathy present.        Right: No inguinal and no supraclavicular adenopathy present.        Left: No  inguinal and no supraclavicular adenopathy present.   Neurological: She is alert and oriented to person, place, and time. She has normal strength. No cranial nerve deficit or sensory deficit. She exhibits normal muscle tone. Coordination normal.   Skin: Skin is warm and dry. No bruising, no lesion, no petechiae and no rash noted. She is not diaphoretic. No erythema. No pallor.   Psychiatric: She has a normal mood and affect. Her behavior is normal. Judgment and thought content normal. Her mood appears not anxious. She does not exhibit a depressed mood.   Nursing note and vitals reviewed.    Labs- reviewed  Assessment:       1. History of breast cancer    2. History of ductal carcinoma in situ (DCIS) of breast        Plan:     1-2. No evidence of disease  Post bilateral mastectomy and no further imaging needed  Desires continued survivorship follow up with oncology    She has vaginal dryness and we will discuss with gyn, she reports given an ointment to try but concerned about cost ($400) and hormone make-up  We discussed role of Dr. Vaca and she would be interested in talking to her      Distress Screening Results: Psychosocial Distress screening score of Distress Score: 0 noted and reviewed. No intervention indicated.

## 2019-03-26 ENCOUNTER — TELEPHONE (OUTPATIENT)
Dept: OBSTETRICS AND GYNECOLOGY | Facility: CLINIC | Age: 69
End: 2019-03-26

## 2019-03-26 NOTE — TELEPHONE ENCOUNTER
----- Message from Shanna Piedra sent at 3/25/2019  3:41 PM CDT -----  Regarding: appointment   Hi,    Can we get patient scheduled to see Dr Newman.    Thanks          MD Tamara Benoit    - please schedule with Dr. Newman

## 2019-03-27 ENCOUNTER — TELEPHONE (OUTPATIENT)
Dept: OBSTETRICS AND GYNECOLOGY | Facility: CLINIC | Age: 69
End: 2019-03-27

## 2019-03-27 NOTE — TELEPHONE ENCOUNTER
----- Message from Tanya Monaco sent at 3/27/2019  1:38 PM CDT -----  Contact: self  Patient is needing to schedule a consult appt for the survivorship clinic. The patient is being referred by Merlene Lui MD patient has history of breast cancer (2002). The patient can be reached at [phone].

## 2019-05-03 ENCOUNTER — OFFICE VISIT (OUTPATIENT)
Dept: OPTOMETRY | Facility: CLINIC | Age: 69
End: 2019-05-03
Payer: MEDICARE

## 2019-05-03 DIAGNOSIS — H04.123 BILATERAL DRY EYES: ICD-10-CM

## 2019-05-03 DIAGNOSIS — H25.13 NUCLEAR SCLEROSIS OF BOTH EYES: ICD-10-CM

## 2019-05-03 DIAGNOSIS — H52.203 HYPEROPIA OF BOTH EYES WITH ASTIGMATISM: ICD-10-CM

## 2019-05-03 DIAGNOSIS — H52.4 PRESBYOPIA OF BOTH EYES: ICD-10-CM

## 2019-05-03 DIAGNOSIS — H52.03 HYPEROPIA OF BOTH EYES WITH ASTIGMATISM: ICD-10-CM

## 2019-05-03 DIAGNOSIS — D31.31 CHOROIDAL NEVUS OF RIGHT EYE: Primary | ICD-10-CM

## 2019-05-03 PROCEDURE — 92250 FUNDUS PHOTOGRAPHY W/I&R: CPT | Mod: HCNC,S$GLB,, | Performed by: OPTOMETRIST

## 2019-05-03 PROCEDURE — 92014 COMPRE OPH EXAM EST PT 1/>: CPT | Mod: HCNC,S$GLB,, | Performed by: OPTOMETRIST

## 2019-05-03 PROCEDURE — 92015 PR REFRACTION: ICD-10-PCS | Mod: HCNC,S$GLB,, | Performed by: OPTOMETRIST

## 2019-05-03 PROCEDURE — 99999 PR PBB SHADOW E&M-EST. PATIENT-LVL III: ICD-10-PCS | Mod: PBBFAC,HCNC,, | Performed by: OPTOMETRIST

## 2019-05-03 PROCEDURE — 99999 PR PBB SHADOW E&M-EST. PATIENT-LVL III: CPT | Mod: PBBFAC,HCNC,, | Performed by: OPTOMETRIST

## 2019-05-03 PROCEDURE — 68761 CLOSE TEAR DUCT OPENING: CPT | Mod: 51,E4,HCNC,S$GLB | Performed by: OPTOMETRIST

## 2019-05-03 PROCEDURE — 92250 COLOR FUNDUS PHOTOGRAPHY - OU - BOTH EYES: ICD-10-PCS | Mod: HCNC,S$GLB,, | Performed by: OPTOMETRIST

## 2019-05-03 PROCEDURE — 92014 PR EYE EXAM, EST PATIENT,COMPREHESV: ICD-10-PCS | Mod: HCNC,S$GLB,, | Performed by: OPTOMETRIST

## 2019-05-03 PROCEDURE — 68761 PR CLOSE TEAR DUCT OPENING BY PLUG,EA: ICD-10-PCS | Mod: 51,E4,HCNC,S$GLB | Performed by: OPTOMETRIST

## 2019-05-03 PROCEDURE — 92015 DETERMINE REFRACTIVE STATE: CPT | Mod: HCNC,S$GLB,, | Performed by: OPTOMETRIST

## 2019-05-03 RX ORDER — UBIDECARENONE 30 MG
CAPSULE ORAL
COMMUNITY
Start: 2019-03-31 | End: 2021-05-25 | Stop reason: SDUPTHER

## 2019-05-03 NOTE — PATIENT INSTRUCTIONS
Bilateral tear-deficient dry eye.  Mrs. Joseph previously had (presumably non-dissolvable) punctal plugs inserted, but no longer in place.  Suggest replace with medium-term dissolvable punctal plugs.  Mrs. Joseph agreeable.  Obtained signed informed consent.  Inserted one 0.4 mm VisiPlug lacrimal plug into the lower punctum bilaterally:       0.4mm Dissolvable VisiPlug          RLL  REF  [id number]                    Lot  [id number]                    2022-11-05         LLL   REF   [id number]                Lot [id number]                2022 -11- 05          Suggest use of a medium viscosity artificial tear (Systane or Optive) in both eyes as needed throughout the day.  Return for recheck if dry eye symptoms not sufficiently relieved.    Choroidal nevus of the right eye, not found on previous eye examination.  Order fundus photos for photodocumentation.  Monitor annually.    Early nuclear sclerosis of lens of both eyes, consistent with age.  No need for cataract surgery in either eye, based on best-corrected VA in each eye with refration today.  Hyperopia with slight astigmatism in each eye, with good best-corrected VA in each eye.  Presbyopia consistent with age.  New spectacle lens Rx issued for use as desired.    Defer any attempt to fit with monovision soft CLs, until dry eye symptoms are satisfactorily relieved.    Return in six months for replacement of punctal plugs.

## 2019-05-03 NOTE — PROGRESS NOTES
"HPI     Concerns About Ocular Health      Additional comments: Notes decrease in visual acuity in both eyes.  Uses +3.00 OTC reading glasses.  Uses plano sunglasses. Did have Rx sunglasses made, but lost them. Has   never had clear Rx glasses for distance viewing.               Comments     Patient's age: 69 y.o. WF  Occupation: retired nurse  Approximate date of last eye examination:  05/2018  Name of last eye doctor seen:      Per patient, dx of bilateral dry eyes, and inserted punctal plugs   inserted into lower puntum bilaterally.  Not sure if plugs still in place.    Also, dx of bilateral cataracts requiring surgery, but surgery not done   in either eye    Wears glasses? yes     If yes, wears  Full-time or part-time?  OTC readers  +3.00      Approximate age of present glasses:   Readers only  Got new glasses following last exam, or subsequently?:  no   Any problem with VA with glasses?  Pt stated  prescribed   sunglasses and she lost them 6 months ago   Wears CLs?:  no  Headaches?  no  Eye pain/discomfort?  Dry eyes -  sometimes burn - uses OTC artificial   tears - unsure of which one she is currently using - no specific drops   used.    Also lubricating gel/ointment at bedtime in both eyes.                                                                                   Flashes?  no  Floaters?   Yes now and then  Diplopia/Double vision?  no  Patient's Ocular History:         Any eye surgeries? no         Any eye injury?  no         Any treatment for eye disease?  no  Family history of eye disease?  no  Significant patient medical history:         1. Diabetes?  no   2. HBP?  no              3. Other (describe):  H/o breast cancer.  Has had "twice"     ! OTC eyedrops currently using:  Systane ointment/gel every night before   bed    ! Prescription eye meds currently using:  no   ! Any history of allergy/adverse reaction to any eye meds used   previously?  no    ! Any history of allergy/adverse " "reaction to eyedrops used during prior   eye exam(s)? no    ! Any history of allergy/adverse reaction to Novacaine or similar meds?   no   ! Any history of allergy/adverse reaction to Epinephrine or similar meds?   no    ! Patient okay with use of anesthetic eyedrops to check eye pressure?    yes        ! Patient okay with use of eyedrops to dilate pupils today?  yes   !  Allergies/Medications/Medical History/Family History reviewed today?    yes      PD =   64/60  Desired reading distance =  14.5"                                                                   Last edited by Jimmy Moraes, OD on 5/3/2019 11:28 AM. (History)            Assessment /Plan     For exam results, see Encounter Report.    1. Choroidal nevus of right eye  Color Fundus Photography - OU - Both Eyes   2. Bilateral dry eyes     3. Nuclear sclerosis of both eyes     4. Hyperopia of both eyes with astigmatism     5. Presbyopia of both eyes                      Bilateral tear-deficient dry eye.  Mrs. Joseph previously had (presumably non-dissolvable) punctal plugs inserted, but no longer in place.  Suggest replace with medium-term dissolvable punctal plugs.  Mrs. Joseph agreeable.  Obtained signed informed consent.  Inserted one 0.4 mm VisiPlug lacrimal plug into the lower punctum bilaterally:       0.4mm Dissolvable VisiPlug          RLL  REF  [id number]                    Lot  [id number] - [id number]                    2022-11-05         LLL   REF   [id number]                Lot [id number]                2022 -11- 05          Suggest use of a medium viscosity artificial tear (Systane or Optive) in both eyes as needed throughout the day.  Return for recheck if dry eye symptoms not sufficiently relieved.    Choroidal nevus of the right eye, not found on previous eye examination.  Order fundus photos for photodocumentation.  Monitor annually.    Early nuclear sclerosis of lens of both eyes, consistent with age.  No need for cataract surgery in either eye, based on " best-corrected VA in each eye with refration today.  Hyperopia with slight astigmatism in each eye, with good best-corrected VA in each eye.  Presbyopia consistent with age.  New spectacle lens Rx issued for use as desired.    Defer any attempt to fit with monovision soft CLs, until dry eye symptoms are satisfactorily relieved.    Return in six months for replacement of punctal plugs.

## 2019-05-20 ENCOUNTER — PATIENT OUTREACH (OUTPATIENT)
Dept: ADMINISTRATIVE | Facility: HOSPITAL | Age: 69
End: 2019-05-20

## 2019-05-20 DIAGNOSIS — Z12.11 ENCOUNTER FOR SCREENING COLONOSCOPY FOR NON-HIGH-RISK PATIENT: Primary | ICD-10-CM

## 2019-05-23 ENCOUNTER — OFFICE VISIT (OUTPATIENT)
Dept: OBSTETRICS AND GYNECOLOGY | Facility: CLINIC | Age: 69
End: 2019-05-23
Attending: OBSTETRICS & GYNECOLOGY
Payer: MEDICARE

## 2019-05-23 VITALS
WEIGHT: 134.5 LBS | DIASTOLIC BLOOD PRESSURE: 82 MMHG | HEIGHT: 63 IN | SYSTOLIC BLOOD PRESSURE: 124 MMHG | BODY MASS INDEX: 23.83 KG/M2

## 2019-05-23 DIAGNOSIS — N95.2 VAGINAL ATROPHY: ICD-10-CM

## 2019-05-23 DIAGNOSIS — N94.19 DYSPAREUNIA DUE TO MEDICAL CONDITION IN FEMALE: ICD-10-CM

## 2019-05-23 DIAGNOSIS — Z85.3 HISTORY OF BREAST CANCER: Primary | ICD-10-CM

## 2019-05-23 PROCEDURE — 1101F PR PT FALLS ASSESS DOC 0-1 FALLS W/OUT INJ PAST YR: ICD-10-PCS | Mod: CPTII,S$GLB,, | Performed by: OBSTETRICS & GYNECOLOGY

## 2019-05-23 PROCEDURE — 99213 PR OFFICE/OUTPT VISIT, EST, LEVL III, 20-29 MIN: ICD-10-PCS | Mod: S$GLB,,, | Performed by: OBSTETRICS & GYNECOLOGY

## 2019-05-23 PROCEDURE — 1101F PT FALLS ASSESS-DOCD LE1/YR: CPT | Mod: CPTII,S$GLB,, | Performed by: OBSTETRICS & GYNECOLOGY

## 2019-05-23 PROCEDURE — 99213 OFFICE O/P EST LOW 20 MIN: CPT | Mod: S$GLB,,, | Performed by: OBSTETRICS & GYNECOLOGY

## 2019-05-24 ENCOUNTER — PATIENT MESSAGE (OUTPATIENT)
Dept: OBSTETRICS AND GYNECOLOGY | Facility: CLINIC | Age: 69
End: 2019-05-24

## 2019-05-26 NOTE — PROGRESS NOTES
"SUBJECTIVE:   69 y.o. female  presents today to discuss vaginal dryness. No LMP recorded. Patient is postmenopausal..  She reports that she saw GYN 1 year ago and was prescribed Premarin cream- it was $400 and she was concerned about using it. She reports that sex is painful- she uses KY lubricant - does not help..      She reports having some hairloss- she is using Rogaine and a Dietary supplement AG PRO  She does report feeling "anxious" occasionally.   Diagnosed in  with breast cancer (intracystic papillary)- very small, discovered at [place] by mammogram, Dr. Cosby wanted re-read on pathology and underwent lumpectomy, required re-excision and XRT.  Negative BRCA performed for strong family history (mother breast ca, maternal gm breast ca, maternal aunt- ovarian ca)  Received no adjuvant therapy     In 2012- she had an abnormal left mammogram, bx +DCIS,, opted for  Bilateral mastectomy 13.  Took Evista x 1-2 months- leg cramps- stopped after discussing with Dr. Cosby due to side effects  Past Medical History:   Diagnosis Date    Breast cancer     also in 2013    Depression     Nephrolithiasis 2015    Osteopenia      Past Surgical History:   Procedure Laterality Date    ADENOIDECTOMY      BILATERAL OOPHORECTOMY      benign    BREAST LUMPECTOMY      Right breast    BREAST RECONSTRUCTION Bilateral 2013    Femoral hernia  2010    right side repair    HEMORRHOID SURGERY      HYSTERECTOMY  1998    KELSEY, fibroid    MASTECTOMY  2013     bilateral     OOPHORECTOMY      TONSILLECTOMY       Social History     Socioeconomic History    Marital status:      Spouse name: Not on file    Number of children: Not on file    Years of education: Not on file    Highest education level: Not on file   Occupational History    Not on file   Social Needs    Financial resource strain: Not on file    Food insecurity:     Worry: Not on file     Inability: Not on file    " Transportation needs:     Medical: Not on file     Non-medical: Not on file   Tobacco Use    Smoking status: Never Smoker    Smokeless tobacco: Never Used   Substance and Sexual Activity    Alcohol use: Yes     Alcohol/week: 0.6 oz     Types: 1 Glasses of wine per week     Comment: Occasionally    Drug use: No    Sexual activity: Yes     Partners: Male     Birth control/protection: Post-menopausal     Comment:    Lifestyle    Physical activity:     Days per week: Not on file     Minutes per session: Not on file    Stress: Not on file   Relationships    Social connections:     Talks on phone: Not on file     Gets together: Not on file     Attends [employer] service: Not on file     Active member of club or organization: Not on file     Attends meetings of clubs or organizations: Not on file     Relationship status: Not on file   Other Topics Concern    Not on file   Social History Narrative    Not on file     Family History   Problem Relation Age of Onset    Breast cancer Mother         Breast cancer-75yrs    Hypertension Father     Heart disease Father 56        MI    Cancer Maternal Aunt         ovarian    Ovarian cancer Maternal Aunt     Cancer Maternal Grandfather         testicular / prostate    Stroke Paternal Grandmother     Breast cancer Maternal Grandmother         Breast cancer-42yrs    Parkinsonism Paternal Grandfather     No Known Problems Sister     Skin cancer Brother     Hyperlipidemia Son         As a child    No Known Problems Daughter     No Known Problems Daughter     No Known Problems Sister     No Known Problems Brother     Amblyopia Neg Hx     Blindness Neg Hx     Cataracts Neg Hx     Diabetes Neg Hx     Glaucoma Neg Hx     Macular degeneration Neg Hx     Retinal detachment Neg Hx     Strabismus Neg Hx     Thyroid disease Neg Hx     Colon cancer Neg Hx      OB History    Para Term  AB Living   3 3 3     3   SAB TAB Ectopic Multiple Live  Births           3      # Outcome Date GA Lbr Ketan/2nd Weight Sex Delivery Anes PTL Lv   3 Term      Vag-Spont   MICHAEL   2 Term      Vag-Spont   MICHAEL   1 Term      Vag-Spont   MICHAEL           Current Outpatient Medications   Medication Sig Dispense Refill    amino acids-minerals (A/G PRO) Tab Take 2 tablets by mouth 3 (three) times daily.      aspirin (ECOTRIN) 81 MG EC tablet Take 81 mg by mouth once daily.      biotin 10,000 mcg TbDL Take 1 tablet by mouth once daily.      CALCIUM CARBONATE/VITAMIN D3 (CALCIUM 500 + D ORAL) Take by mouth. 1 Capsule Oral Every evening      cholecalciferol, vitamin D3, (VITAMIN D3) 2,000 unit Cap Take 1 capsule by mouth once daily.      co-enzyme Q-10 30 mg capsule       coenzyme Q10-vitamin E (COQ10 ) 100-100 mg-unit Cap Take by mouth. 1 Capsule Oral Every day      [START ON 5/27/2019] estradiol (IMVEXXY MAINTENANCE PACK) 10 mcg Inst Place 1 suppository vaginally twice a week. 8 each 11    fish oil-dha-epa 1,200-144-216 mg Cap Take by mouth. 1 Capsule Oral Every day      minoxidil (ROGAINE) 5 % Foam Apply topically once daily.      multivitamin (THERAGRAN) per tablet Take by mouth. 1 Tablet Oral Every day       No current facility-administered medications for this visit.      Allergies: Sulfa (sulfonamide antibiotics)     The 10-year ASCVD risk score (Adieljerilyn KUMAR Jr., et al., 2013) is: 7.8%    Values used to calculate the score:      Age: 69 years      Sex: Female      Is Non- : No      Diabetic: No      Tobacco smoker: No      Systolic Blood Pressure: 124 mmHg      Is BP treated: No      HDL Cholesterol: 62 mg/dL      Total Cholesterol: 196 mg/dL      ROS:  Constitutional: no weight loss, weight gain, fever, fatigue  Eyes:  No vision changes, glasses/contacts  ENT/Mouth: No ulcers, sinus problems, ears ringing, headache  Cardiovascular: No inability to lie flat, chest pain, exercise intolerance, swelling, heart palpitations  Respiratory: No wheezing,  coughing blood, shortness of breath, or cough  Gastrointestinal: No diarrhea, bloody stool, nausea/vomiting, constipation, gas, hemorrhoids  Genitourinary: No blood in urine, painful urination, urgency of urination, frequency of urination, incomplete emptying, incontinence, abnormal bleeding, painful periods, heavy periods, vaginal discharge, vaginal odor, +painful intercourse, sexual problems, bleeding after intercourse, +vaginal dryness.  Musculoskeletal: No muscle weakness  Skin/Breast: +breast cancer  Neurological: No passing out, seizures, numbness, headache  Endocrine: No diabetes, hypothyroid, hyperthyroid, hot flashes, hair loss, abnormal hair growth, acne  Psychiatric: No depression, crying  Hematologic: No bruises, bleeding, swollen lymph nodes, anemia.      Physical Exam  deferred    ASSESSMENT:   History of breast cancer  Vaginal dryness  Pain with intercourse    PLAN  Face to facet time 25 minutes- majority spent in counseling and arranging follow up with PCP in the future  Counseled her on risks/benefits of Imvexxy  Counseled her on use of lubricant SylK

## 2019-05-27 ENCOUNTER — TELEPHONE (OUTPATIENT)
Dept: INTERNAL MEDICINE | Facility: CLINIC | Age: 69
End: 2019-05-27

## 2019-05-27 NOTE — TELEPHONE ENCOUNTER
----- Message from Briseyda Newman MD sent at 5/25/2019  7:54 PM CDT -----  I hope you are doing well. I know you will be going out on leave soon- excited for you and your upcoming new addition!  This patient is not due until 2/2020 but she is a breast cancer survivor and she would like a new PCP..  Best,  Shira

## 2019-05-27 NOTE — TELEPHONE ENCOUNTER
Anahi, will you send pt a letter to remind her to schedule appt to est care two months or so before I come back from maternity leave?

## 2019-07-09 ENCOUNTER — PATIENT MESSAGE (OUTPATIENT)
Dept: OBSTETRICS AND GYNECOLOGY | Facility: CLINIC | Age: 69
End: 2019-07-09

## 2019-07-10 ENCOUNTER — PATIENT MESSAGE (OUTPATIENT)
Dept: OBSTETRICS AND GYNECOLOGY | Facility: CLINIC | Age: 69
End: 2019-07-10

## 2019-07-11 ENCOUNTER — TELEPHONE (OUTPATIENT)
Dept: OBSTETRICS AND GYNECOLOGY | Facility: CLINIC | Age: 69
End: 2019-07-11

## 2019-07-11 NOTE — TELEPHONE ENCOUNTER
Patient notes discomfort and irritation s/p 8 doses of Imvexxy. Patient has discontinued medication, yet notes she is still experiencing some irritation on and off. Pt remarks Sunnyyn is working well for her. RN conferred with Dr. Newman. IDHEA 5mg intravaginally an option for patient if she wishes to pursue. RN discussed option with patient, she elects to wait a few more weeks to decide. Her  is undergoing a cardiac angiogram and she would like some time for things in her life to settle at the present time. Pt says Sunnyyn working just fine right now. RN expressed her support for patient's current situation and understanding. RN encouraged pt to call/message if she has any questions/if she is interested in pursuing Rx. ANDREIA Wood.

## 2019-07-16 ENCOUNTER — LAB VISIT (OUTPATIENT)
Dept: LAB | Facility: HOSPITAL | Age: 69
End: 2019-07-16
Attending: PHYSICIAN ASSISTANT
Payer: MEDICARE

## 2019-07-16 ENCOUNTER — OFFICE VISIT (OUTPATIENT)
Dept: ENDOCRINOLOGY | Facility: CLINIC | Age: 69
End: 2019-07-16
Payer: MEDICARE

## 2019-07-16 VITALS
HEIGHT: 63 IN | TEMPERATURE: 98 F | SYSTOLIC BLOOD PRESSURE: 140 MMHG | WEIGHT: 135.81 LBS | BODY MASS INDEX: 24.06 KG/M2 | DIASTOLIC BLOOD PRESSURE: 80 MMHG | HEART RATE: 53 BPM

## 2019-07-16 DIAGNOSIS — E78.1 HYPERTRIGLYCERIDEMIA: ICD-10-CM

## 2019-07-16 DIAGNOSIS — E04.1 THYROID NODULE: ICD-10-CM

## 2019-07-16 DIAGNOSIS — Z78.0 POSTMENOPAUSAL: ICD-10-CM

## 2019-07-16 DIAGNOSIS — E21.0 PRIMARY HYPERPARATHYROIDISM: Primary | ICD-10-CM

## 2019-07-16 DIAGNOSIS — E55.9 HYPOVITAMINOSIS D: ICD-10-CM

## 2019-07-16 DIAGNOSIS — Z13.220 SCREENING CHOLESTEROL LEVEL: ICD-10-CM

## 2019-07-16 DIAGNOSIS — E21.0 PRIMARY HYPERPARATHYROIDISM: ICD-10-CM

## 2019-07-16 DIAGNOSIS — M85.80 OSTEOPENIA, UNSPECIFIED LOCATION: ICD-10-CM

## 2019-07-16 LAB
25(OH)D3+25(OH)D2 SERPL-MCNC: 37 NG/ML (ref 30–96)
ALBUMIN SERPL BCP-MCNC: 4.1 G/DL (ref 3.5–5.2)
ALP SERPL-CCNC: 80 U/L (ref 55–135)
ALT SERPL W/O P-5'-P-CCNC: 27 U/L (ref 10–44)
ANION GAP SERPL CALC-SCNC: 8 MMOL/L (ref 8–16)
AST SERPL-CCNC: 23 U/L (ref 10–40)
BILIRUB SERPL-MCNC: 0.5 MG/DL (ref 0.1–1)
BUN SERPL-MCNC: 16 MG/DL (ref 8–23)
CA-I BLDV-SCNC: 1.36 MMOL/L (ref 1.06–1.42)
CALCIUM SERPL-MCNC: 10.6 MG/DL (ref 8.7–10.5)
CHLORIDE SERPL-SCNC: 105 MMOL/L (ref 95–110)
CHOLEST SERPL-MCNC: 194 MG/DL (ref 120–199)
CHOLEST/HDLC SERPL: 3.2 {RATIO} (ref 2–5)
CO2 SERPL-SCNC: 30 MMOL/L (ref 23–29)
CREAT SERPL-MCNC: 0.8 MG/DL (ref 0.5–1.4)
EST. GFR  (AFRICAN AMERICAN): >60 ML/MIN/1.73 M^2
EST. GFR  (NON AFRICAN AMERICAN): >60 ML/MIN/1.73 M^2
GLUCOSE SERPL-MCNC: 89 MG/DL (ref 70–110)
HDLC SERPL-MCNC: 60 MG/DL (ref 40–75)
HDLC SERPL: 30.9 % (ref 20–50)
LDLC SERPL CALC-MCNC: 100.8 MG/DL (ref 63–159)
MAGNESIUM SERPL-MCNC: 2.2 MG/DL (ref 1.6–2.6)
NONHDLC SERPL-MCNC: 134 MG/DL
PHOSPHATE SERPL-MCNC: 4.7 MG/DL (ref 2.7–4.5)
POTASSIUM SERPL-SCNC: 3.8 MMOL/L (ref 3.5–5.1)
PROT SERPL-MCNC: 7.2 G/DL (ref 6–8.4)
PTH-INTACT SERPL-MCNC: 51 PG/ML (ref 9–77)
SODIUM SERPL-SCNC: 143 MMOL/L (ref 136–145)
T3 SERPL-MCNC: 82 NG/DL (ref 60–180)
T4 FREE SERPL-MCNC: 0.96 NG/DL (ref 0.71–1.51)
TRIGL SERPL-MCNC: 166 MG/DL (ref 30–150)
TSH SERPL DL<=0.005 MIU/L-ACNC: 1.47 UIU/ML (ref 0.4–4)

## 2019-07-16 PROCEDURE — 82306 VITAMIN D 25 HYDROXY: CPT | Mod: HCNC

## 2019-07-16 PROCEDURE — 84480 ASSAY TRIIODOTHYRONINE (T3): CPT | Mod: HCNC

## 2019-07-16 PROCEDURE — 82308 ASSAY OF CALCITONIN: CPT | Mod: HCNC

## 2019-07-16 PROCEDURE — 99213 PR OFFICE/OUTPT VISIT, EST, LEVL III, 20-29 MIN: ICD-10-PCS | Mod: HCNC,S$GLB,, | Performed by: PHYSICIAN ASSISTANT

## 2019-07-16 PROCEDURE — 82330 ASSAY OF CALCIUM: CPT | Mod: HCNC

## 2019-07-16 PROCEDURE — 86800 THYROGLOBULIN ANTIBODY: CPT | Mod: HCNC

## 2019-07-16 PROCEDURE — 99499 RISK ADDL DX/OHS AUDIT: ICD-10-PCS | Mod: HCNC,S$GLB,, | Performed by: PHYSICIAN ASSISTANT

## 2019-07-16 PROCEDURE — 83970 ASSAY OF PARATHORMONE: CPT | Mod: HCNC

## 2019-07-16 PROCEDURE — 99499 UNLISTED E&M SERVICE: CPT | Mod: HCNC,S$GLB,, | Performed by: PHYSICIAN ASSISTANT

## 2019-07-16 PROCEDURE — 1101F PT FALLS ASSESS-DOCD LE1/YR: CPT | Mod: HCNC,CPTII,S$GLB, | Performed by: PHYSICIAN ASSISTANT

## 2019-07-16 PROCEDURE — 84443 ASSAY THYROID STIM HORMONE: CPT | Mod: HCNC

## 2019-07-16 PROCEDURE — 36415 COLL VENOUS BLD VENIPUNCTURE: CPT | Mod: HCNC,PO

## 2019-07-16 PROCEDURE — 83735 ASSAY OF MAGNESIUM: CPT | Mod: HCNC

## 2019-07-16 PROCEDURE — 80053 COMPREHEN METABOLIC PANEL: CPT | Mod: HCNC

## 2019-07-16 PROCEDURE — 84439 ASSAY OF FREE THYROXINE: CPT | Mod: HCNC

## 2019-07-16 PROCEDURE — 80061 LIPID PANEL: CPT | Mod: HCNC

## 2019-07-16 PROCEDURE — 1101F PR PT FALLS ASSESS DOC 0-1 FALLS W/OUT INJ PAST YR: ICD-10-PCS | Mod: HCNC,CPTII,S$GLB, | Performed by: PHYSICIAN ASSISTANT

## 2019-07-16 PROCEDURE — 99999 PR PBB SHADOW E&M-EST. PATIENT-LVL III: ICD-10-PCS | Mod: PBBFAC,HCNC,, | Performed by: PHYSICIAN ASSISTANT

## 2019-07-16 PROCEDURE — 84100 ASSAY OF PHOSPHORUS: CPT | Mod: HCNC

## 2019-07-16 PROCEDURE — 99213 OFFICE O/P EST LOW 20 MIN: CPT | Mod: HCNC,S$GLB,, | Performed by: PHYSICIAN ASSISTANT

## 2019-07-16 PROCEDURE — 99999 PR PBB SHADOW E&M-EST. PATIENT-LVL III: CPT | Mod: PBBFAC,HCNC,, | Performed by: PHYSICIAN ASSISTANT

## 2019-07-16 NOTE — PROGRESS NOTES
"CC: Hyperparathyroidism/Nodular thyroid disease and osteopenia    HPI: Eva Joseph is a 69 y.o. female here for nodular thyroid disease along with pending conditions listed in the Visit Diagnosis. Diagnosed ~ 4 months ago. No fhx of thyroid disease. Her grandson has Type 1 DM. Patient had bilateral breast cancer for which she was treated with bilateral mastectomy. She was -ve for BRCA1 and 2. Patient is a retired RN.     Stated she started imvexxy and felt swollen under her right arm and stopped.     Thyroid u/s 7/18 shows 1.6 cm nodule in left lobe that had a benign FNA. No SOB or voice changes.     Kidney stones 1970, 1977 w/ pregnancies. She also had one in 2005. No n/v, abdominal pain or muscle weakness.     DEXA scan: 1/19 osteopenia. No fractures, steriod injections. She has had a few falls without injections. Phx of osteoporosis and took reclast for three years. Taking calcium carbonate. Taking 2000 IU of vitamin d. She is doing yoga and line dancing 2x per week. She goes [employer] to aerobics class.  No recent kidney stones.     No heat/cold intolerance, palpations, d/c.    PMHx, PSHx: reviewed in epic.    Social Hx: no ETOH/tobacco use.     ROS:   Constitutional: no fatigue, wt loss  Eyes: No recent visual changes  Cardiovascular: + feet sweling, Denies current anginal symptoms  Respiratory: Denies current respiratory difficulty  Gastrointestinal: Denies recent bowel disturbances  GenitoUrinary - No dysuria  Skin: No new skin rash  Neurologic: + numbness and tingling in feet  Endocrine: no polyphagia, polydipsia or polyuria  Remainder ROS negative     BP (!) 140/80 (BP Location: Left arm, Patient Position: Sitting, BP Method: Medium (Manual))   Pulse (!) 53   Temp 98 °F (36.7 °C) (Oral)   Ht 5' 3" (1.6 m)   Wt 61.6 kg (135 lb 12.9 oz)   BMI 24.06 kg/m²      Personally reviewed labs below:  Lab Results   Component Value Date    TSH 1.728 01/16/2019    S8TJDKY 80 01/16/2019    FREET4 0.97 01/16/2019 "     Lab Results   Component Value Date    .0 (H) 01/16/2019    CALCIUM 10.0 01/16/2019    CAION 1.27 01/16/2019    PHOS 3.5 07/09/2018         Chemistry        Component Value Date/Time     01/16/2019 1017    K 4.1 01/16/2019 1017     01/16/2019 1017    CO2 28 01/16/2019 1017    BUN 11 01/16/2019 1017    CREATININE 0.7 01/16/2019 1017    GLU 87 01/16/2019 1017        Component Value Date/Time    CALCIUM 10.0 01/16/2019 1017    ALKPHOS 92 01/16/2019 1017    AST 23 01/16/2019 1017    ALT 24 01/16/2019 1017    BILITOT 0.7 01/16/2019 1017    ESTGFRAFRICA >60.0 01/16/2019 1017    EGFRNONAA >60.0 01/16/2019 1017         No results found for: HGBA1C     PE:  GENERAL: elderly female (looks younger than stated age), well developed, well nourished  NECK: Supple neck, normal thyroid. No bruit  LYMPHATIC: No cervical or supraclavicular lymphadenopathy  CARDIOVASCULAR: Normal heart sounds, no pedal edema  RESPIRATORY: Normal effort, clear to auscultation bl  ABDOMEN: soft, non-tender, non-distended.  FEET: appropriate footwear.   Psych: normal mood and affect    Assessment/Plan:   1. Primary hyperparathyroidism  PTH, intact    Calcium, ionized    Magnesium    Comprehensive metabolic panel    Phosphorus   2. Osteopenia, unspecified location     3. Thyroid nodule  T4, free    T3    Thyroglobulin    TSH    Calcitonin    US Soft Tissue Head Neck Thyroid   4. Postmenopausal     5. Hypovitaminosis D  Vitamin D   6. Screening cholesterol level  Lipid panel   7. Hypertriglyceridemia  Lipid panel     Primary hyperparathyroidism-PTH wnl-monitor.   Osteopenia-continue ca and vd. Repeat DEXA 1/21  Thyroid nodule-TFTs wnl. Repeat thyroid u/s 7/19  Postmenopausal-see above  Hypovitaminosis H-btetjg-xhevgbzh otc vd  Xqltasshoprpwujmmegh-nodytk-zbjmxlig fish oil and ASA    F/u in 6 mths

## 2019-07-18 LAB
CALCIT SERPL-MCNC: <5 PG/ML
THRYOGLOBULIN INTERPRETATION: ABNORMAL
THYROGLOB AB SERPL-ACNC: <1.8 IU/ML
THYROGLOB SERPL-MCNC: 45 NG/ML

## 2019-07-21 PROBLEM — E78.1 HYPERTRIGLYCERIDEMIA: Status: ACTIVE | Noted: 2019-07-21

## 2019-07-21 PROBLEM — E55.9 HYPOVITAMINOSIS D: Status: ACTIVE | Noted: 2019-07-21

## 2019-07-23 ENCOUNTER — HOSPITAL ENCOUNTER (OUTPATIENT)
Dept: RADIOLOGY | Facility: CLINIC | Age: 69
Discharge: HOME | End: 2019-07-23
Attending: PHYSICIAN ASSISTANT
Payer: MEDICARE

## 2019-07-23 DIAGNOSIS — E04.1 THYROID NODULE: ICD-10-CM

## 2019-07-23 PROCEDURE — 76536 US SOFT TISSUE HEAD NECK THYROID: ICD-10-PCS | Mod: 26,HCNC,, | Performed by: RADIOLOGY

## 2019-07-23 PROCEDURE — 76536 US EXAM OF HEAD AND NECK: CPT | Mod: 26,HCNC,, | Performed by: RADIOLOGY

## 2019-07-23 PROCEDURE — 76536 US EXAM OF HEAD AND NECK: CPT | Mod: TC,HCNC,PO

## 2019-08-16 DIAGNOSIS — M25.562 LEFT KNEE PAIN, UNSPECIFIED CHRONICITY: Primary | ICD-10-CM

## 2019-08-16 NOTE — PROGRESS NOTES
Spoke with pt to inform patient to arrive 20-30 minutes prior to appt with Dr. Rodríguez for x-rays.     Tiffany Naranjo  Clinical Assistant to Dr. Dulce Maria Rodríguez

## 2019-08-22 ENCOUNTER — HOSPITAL ENCOUNTER (OUTPATIENT)
Dept: RADIOLOGY | Facility: HOSPITAL | Age: 69
Discharge: HOME | End: 2019-08-22
Attending: NEUROMUSCULOSKELETAL MEDICINE & OMM
Payer: MEDICARE

## 2019-08-22 ENCOUNTER — OFFICE VISIT (OUTPATIENT)
Dept: ORTHOPEDICS | Facility: CLINIC | Age: 69
End: 2019-08-22
Payer: MEDICARE

## 2019-08-22 VITALS
WEIGHT: 135 LBS | SYSTOLIC BLOOD PRESSURE: 124 MMHG | DIASTOLIC BLOOD PRESSURE: 80 MMHG | HEIGHT: 63 IN | BODY MASS INDEX: 23.92 KG/M2

## 2019-08-22 DIAGNOSIS — M99.02 SOMATIC DYSFUNCTION OF THORACIC REGION: ICD-10-CM

## 2019-08-22 DIAGNOSIS — G89.29 CHRONIC RIGHT SHOULDER PAIN: ICD-10-CM

## 2019-08-22 DIAGNOSIS — M25.511 CHRONIC RIGHT SHOULDER PAIN: ICD-10-CM

## 2019-08-22 DIAGNOSIS — M25.562 ACUTE PAIN OF LEFT KNEE: Primary | ICD-10-CM

## 2019-08-22 DIAGNOSIS — M25.562 LEFT KNEE PAIN, UNSPECIFIED CHRONICITY: ICD-10-CM

## 2019-08-22 DIAGNOSIS — M79.10 MYALGIA: ICD-10-CM

## 2019-08-22 DIAGNOSIS — R29.3 POOR POSTURE: ICD-10-CM

## 2019-08-22 DIAGNOSIS — Z90.13 HISTORY OF MASTECTOMY, BILATERAL: ICD-10-CM

## 2019-08-22 DIAGNOSIS — M99.06 SOMATIC DYSFUNCTION OF LOWER EXTREMITY: ICD-10-CM

## 2019-08-22 DIAGNOSIS — M99.04 SACRAL REGION SOMATIC DYSFUNCTION: ICD-10-CM

## 2019-08-22 DIAGNOSIS — M99.05 SOMATIC DYSFUNCTION OF PELVIC REGION: ICD-10-CM

## 2019-08-22 DIAGNOSIS — M99.03 SOMATIC DYSFUNCTION OF LUMBAR REGION: ICD-10-CM

## 2019-08-22 DIAGNOSIS — M99.08 SOMATIC DYSFUNCTION OF RIB CAGE REGION: ICD-10-CM

## 2019-08-22 PROCEDURE — 99999 PR PBB SHADOW E&M-EST. PATIENT-LVL II: CPT | Mod: PBBFAC,HCNC,, | Performed by: NEUROMUSCULOSKELETAL MEDICINE & OMM

## 2019-08-22 PROCEDURE — 99204 OFFICE O/P NEW MOD 45 MIN: CPT | Mod: 25,HCNC,S$GLB, | Performed by: NEUROMUSCULOSKELETAL MEDICINE & OMM

## 2019-08-22 PROCEDURE — 73562 X-RAY EXAM OF KNEE 3: CPT | Mod: 59,TC,HCNC,PO,RT

## 2019-08-22 PROCEDURE — 98927 OSTEOPATH MANJ 5-6 REGIONS: CPT | Mod: HCNC,S$GLB,, | Performed by: NEUROMUSCULOSKELETAL MEDICINE & OMM

## 2019-08-22 PROCEDURE — 1101F PR PT FALLS ASSESS DOC 0-1 FALLS W/OUT INJ PAST YR: ICD-10-PCS | Mod: HCNC,CPTII,S$GLB, | Performed by: NEUROMUSCULOSKELETAL MEDICINE & OMM

## 2019-08-22 PROCEDURE — 97110 THERAPEUTIC EXERCISES: CPT | Mod: GP,HCNC,S$GLB, | Performed by: NEUROMUSCULOSKELETAL MEDICINE & OMM

## 2019-08-22 PROCEDURE — 1101F PT FALLS ASSESS-DOCD LE1/YR: CPT | Mod: HCNC,CPTII,S$GLB, | Performed by: NEUROMUSCULOSKELETAL MEDICINE & OMM

## 2019-08-22 PROCEDURE — 99999 PR PBB SHADOW E&M-EST. PATIENT-LVL II: ICD-10-PCS | Mod: PBBFAC,HCNC,, | Performed by: NEUROMUSCULOSKELETAL MEDICINE & OMM

## 2019-08-22 PROCEDURE — 73564 X-RAY EXAM KNEE 4 OR MORE: CPT | Mod: 26,HCNC,59,LT | Performed by: RADIOLOGY

## 2019-08-22 PROCEDURE — 99204 PR OFFICE/OUTPT VISIT, NEW, LEVL IV, 45-59 MIN: ICD-10-PCS | Mod: 25,HCNC,S$GLB, | Performed by: NEUROMUSCULOSKELETAL MEDICINE & OMM

## 2019-08-22 PROCEDURE — 73564 X-RAY EXAM KNEE 4 OR MORE: CPT | Mod: 26,HCNC,RT, | Performed by: RADIOLOGY

## 2019-08-22 PROCEDURE — 98927 PR OSTEOPATHIC MANIP,5-6 BODY REGN: ICD-10-PCS | Mod: HCNC,S$GLB,, | Performed by: NEUROMUSCULOSKELETAL MEDICINE & OMM

## 2019-08-22 PROCEDURE — 97110 PR THERAPEUTIC EXERCISES: ICD-10-PCS | Mod: GP,HCNC,S$GLB, | Performed by: NEUROMUSCULOSKELETAL MEDICINE & OMM

## 2019-08-22 PROCEDURE — 73564 PR  X-RAY KNEE 4+ VIEW: ICD-10-PCS | Mod: 26,HCNC,RT, | Performed by: RADIOLOGY

## 2019-08-22 NOTE — PROGRESS NOTES
Subjective:     Eva Joseph     Chief Complaint   Patient presents with    Left Knee - Pain, Swelling       HPI    Eva is a 69 y.o. female coming in today for left knee pain that began 2 week(s) ago, referred by self. Pt. describes the pain as a 0/10 dull pain that does not radiate. There was not a fall/injury/ or trauma associated with the onset of symptoms. Pt was visiting the endocrinologist who noted that her L knee seemed swollen. Pt would like to get it checked out but is not in severe pain. She does aerobics, yoga, line dancing and ballroom dancing and notes some mild pain with full flexion and some tightness around the knee. Pt. Also notes right shoulder stiffness that began after her double mastectomy surgery 5 years ago. The pain is better with yoga and worse with certain movements in her knee and overhead activity in her shoudler. Pt. Denies any other musculoskeletal complaints at this time.     Joint instability? No  Mechanical locking/clicking?  No  Affecting ADL's?  No  Affecting sleep?  No    Occupation: retired    Review of Systems   Constitutional: Negative for chills and fever.   HENT: Negative for hearing loss and tinnitus.    Eyes: Negative for blurred vision and photophobia.   Respiratory: Negative for cough and shortness of breath.    Cardiovascular: Negative for chest pain and leg swelling.   Gastrointestinal: Negative for abdominal pain, heartburn, nausea and vomiting.   Genitourinary: Negative for dysuria and hematuria.   Musculoskeletal: Positive for joint pain. Negative for back pain, falls, myalgias and neck pain.   Skin: Negative for rash.   Neurological: Negative for dizziness, tingling, focal weakness, weakness and headaches.   Endo/Heme/Allergies: Negative for environmental allergies. Does not bruise/bleed easily.   Psychiatric/Behavioral: Negative for depression. The patient is not nervous/anxious.        PAST MEDICAL HISTORY:   Past Medical History:   Diagnosis Date     Breast cancer 2000    also in 2013    Depression     Hypertriglyceridemia 7/21/2019    Nephrolithiasis 2/5/2015    Osteopenia      PAST SURGICAL HISTORY:   Past Surgical History:   Procedure Laterality Date    ADENOIDECTOMY      BILATERAL OOPHORECTOMY      benign    BREAST LUMPECTOMY  2000    Right breast    BREAST RECONSTRUCTION Bilateral 2013    Femoral hernia  2010    right side repair    HEMORRHOID SURGERY      HYSTERECTOMY  1998    KELSEY, fibroid    MASTECTOMY  2013     bilateral     OOPHORECTOMY      TONSILLECTOMY       FAMILY HISTORY:   Family History   Problem Relation Age of Onset    Breast cancer Mother         Breast cancer-75yrs    Hypertension Father     Heart disease Father 56        MI    Cancer Maternal Aunt         ovarian    Ovarian cancer Maternal Aunt     Cancer Maternal Grandfather         testicular / prostate    Stroke Paternal Grandmother     Breast cancer Maternal Grandmother         Breast cancer-42yrs    Parkinsonism Paternal Grandfather     No Known Problems Sister     Skin cancer Brother     Hyperlipidemia Son         As a child    No Known Problems Daughter     No Known Problems Daughter     No Known Problems Sister     No Known Problems Brother     Amblyopia Neg Hx     Blindness Neg Hx     Cataracts Neg Hx     Diabetes Neg Hx     Glaucoma Neg Hx     Macular degeneration Neg Hx     Retinal detachment Neg Hx     Strabismus Neg Hx     Thyroid disease Neg Hx     Colon cancer Neg Hx      SOCIAL HISTORY:   Social History     Socioeconomic History    Marital status:      Spouse name: Not on file    Number of children: Not on file    Years of education: Not on file    Highest education level: Not on file   Occupational History    Not on file   Social Needs    Financial resource strain: Not on file    Food insecurity:     Worry: Not on file     Inability: Not on file    Transportation needs:     Medical: Not on file     Non-medical: Not on  file   Tobacco Use    Smoking status: Never Smoker    Smokeless tobacco: Never Used   Substance and Sexual Activity    Alcohol use: Yes     Alcohol/week: 0.6 oz     Types: 1 Glasses of wine per week     Comment: Occasionally    Drug use: No    Sexual activity: Yes     Partners: Male     Birth control/protection: Post-menopausal     Comment:    Lifestyle    Physical activity:     Days per week: Not on file     Minutes per session: Not on file    Stress: Not on file   Relationships    Social connections:     Talks on phone: Not on file     Gets together: Not on file     Attends Congregation service: Not on file     Active member of club or organization: Not on file     Attends meetings of clubs or organizations: Not on file     Relationship status: Not on file   Other Topics Concern    Not on file   Social History Narrative    Not on file       MEDICATIONS:   Current Outpatient Medications:     amino acids-minerals (A/G PRO) Tab, Take 2 tablets by mouth 3 (three) times daily., Disp: , Rfl:     aspirin (ECOTRIN) 81 MG EC tablet, Take 81 mg by mouth once daily., Disp: , Rfl:     biotin 10,000 mcg TbDL, Take 1 tablet by mouth once daily., Disp: , Rfl:     CALCIUM CARBONATE/VITAMIN D3 (CALCIUM 500 + D ORAL), Take by mouth. 1 Capsule Oral Every evening, Disp: , Rfl:     cholecalciferol, vitamin D3, (VITAMIN D3) 2,000 unit Cap, Take 1 capsule by mouth once daily., Disp: , Rfl:     co-enzyme Q-10 30 mg capsule, , Disp: , Rfl:     coenzyme Q10-vitamin E (COQ10 ) 100-100 mg-unit Cap, Take by mouth. 1 Capsule Oral Every day, Disp: , Rfl:     estradiol (IMVEXXY MAINTENANCE PACK) 10 mcg Inst, Place 1 suppository vaginally twice a week., Disp: 8 each, Rfl: 11    fish oil-dha-epa 1,200-144-216 mg Cap, Take by mouth. 1 Capsule Oral Every day, Disp: , Rfl:     minoxidil (ROGAINE) 5 % Foam, Apply topically once daily., Disp: , Rfl:     multivitamin (THERAGRAN) per tablet, Take by mouth. 1 Tablet Oral  "Every day, Disp: , Rfl:   ALLERGIES:   Review of patient's allergies indicates:   Allergen Reactions    Sulfa (sulfonamide antibiotics) Other (See Comments)       Objective:     VITAL SIGNS: /80 (BP Location: Right arm, Patient Position: Sitting, BP Method: Large (Manual))   Ht 5' 3" (1.6 m)   Wt 61.2 kg (135 lb)   BMI 23.91 kg/m²    General    Nursing note and vitals reviewed.  Constitutional: She is oriented to person, place, and time. She appears well-developed and well-nourished.   HENT:   Head: Normocephalic and atraumatic.   no nasal discharge, no external ear redness or discharge   Eyes:   EOM is full and smooth  No eye redness or discharge   Neck: Neck supple. No tracheal deviation present.   Cardiovascular: Normal rate.    2+ Radial pulse bilaterally  2+ Dorsalis Pedis pulse bilaterally  No LE edema appreciated   Pulmonary/Chest: Effort normal. No respiratory distress.   Abdominal: She exhibits no distension.   No rigidity   Neurological: She is alert and oriented to person, place, and time. She exhibits normal muscle tone. Coordination normal.   See details below   Psychiatric: She has a normal mood and affect. Her behavior is normal.               MUSCULOSKELETAL EXAM    Left KNEE EXAMINATION   Affected side is compared to contralateral knee     Observation:  No edema, erythema, ecchymosis, or effusion noted.  No muscle atrophy of the thighs and calves noted.  No obvious bony deformities noted.   No Genu valgus/varum noted.  No recurvatum noted.    No tibial internal/external torsion.    Posture:  Increased thoracic kyphosis, Anterior head carriage and Posterior pelvis tilt with loss of lumbar lordosis  Gait: Non-antalgic with Neutral ankle mechanics and Neutral medial arch    Tenderness:  Patella - none    Lateral joint line - none  Quad tendon - none   Medial joint line - none  Patellar tendon - none   Medial plica - none  Tibial tubercle - none   Lateral plica - none  Pes anserine - " none   MCL prox - none  Distal ITB - none   MCL distal - none  MFC - none    LCL prox - none  LFC - none    LCL distal - none  Tibia - none    Fibula - none    No obvious bursae, plicae, popliteal cysts, or tendon derangement palpated.          ROM (* = with pain):   Active extension to 0° on left without hyperextension, lag, crepitus, or patellar J sign.   Active extension to 0° on right without hyperextension, lag, crepitus, or patellar J sign.  Active flexion to 135° on left and 135° on right    Strength(* = with pain):  Knee Flexion - 5/5 on left and 5/5 on right  Knee Extension - 5/5 on left and 5/5 on right  Hip Flexion - 5/5 on left and 5/5 on right  Hip Extension - 5/5 on left and 5/5 on right  Ankle dorsiflexion - 5/5 on left and 5/5 on right  Ankle Plantarflexion - 5/5 on left and 5/5 on right  glutaeus medius - 5/5 on left and 5/5 on right    Patellofemoral Exam:   Patellar ballottement - negative  Bulge sign - negative  Patellar grind - negative    No patellar laxity with medial and lateral translation   No apprehension with medial and lateral patellar translation.     Meniscus Testing:     No pain with terminal extension and flexion at joint lines.  Nancys test - negative   Thesaly test - negative  Bounce home test - negative    Ligament Testing:  Lachman's test - negative  No laxity with anterior drawer.  No laxity with posterior drawer.    No posterior sag sign.   Prone dial testing - negative  No laxity with varus testing at 0 and 30 degrees.  No laxity with valgus testing at 0 and 30 degrees.    IT band testing:  Catinas test - negative   Noble Compression test - negative    Neurovascular Examination:   Normal gait without antalgia.  Sensation intact to light touch in the obturator, lateral/intermediate/medial/posterior femoral cutaneous, saphenous, and common peroneal nerves bilaterally.  Hamstring/gluteal firing pattern normal and symmetric.   Motor Function:    Fully intact motor function at  hip, knee, foot and ankle.  DTRs: 2+/4 reflexes at L4 and S1 dermatomes. No clonus. Downgoing Babinski.   Negative seated straight leg raise bilaterally.    Pulses intact at the DP and PT arteries bilaterally.    Capillary refill intact <2 seconds in all toes bilaterally.    Shoulder: right SHOULDER  The affected shoulder is compared to the contralateral shoulder.    Observation:    CERVICAL SPINE  Normal head carriage. Normal thoracic kyphosis.    SHOULDER  No ecchymosis, edema, or erythema throughout the shoulder girdle.  No sternal, clavicular, or acromial deformities bilaterally.  No atrophy of the pectorals, deltoids, supraspinatus, infraspinatus, or biceps bilaterally.  Increased right upper trapezius and levator scapulae tone, right anterior shoulder girdle    ROM (* = with pain):  CERVICAL SPINE  Full AROM in flexion, extension, sidebending, and rotation.    SHOULDER  Active flexion to 180° on left and 160° on right* (tightness).  Active abduction to 180° on left and 175° on right* (tightness).  Active internal rotation to T7 on left and T8 on right.    Active external rotation to T4 on left and T4 on right.    No scapular dyskinesia or winging.    Tenderness:  No tenderness at the SC or AC joint  No tenderness over the clavicle   No tenderness over biceps tendon or bicipital groove  No tenderness over subacromial space    Strength Testing (* = with pain):  Deltoid - 5/5 on left and 5/5 on right  Biceps - 5/5 on left and 5/5 on right  Triceps - 5/5 on left and 5/5 on right  Wrist extension - 5/5 on left and 5/5 on right  Wrist flexion - 5/5 on left and 5/5 on right   - 5/5 on left and 5/5 on right  Finger extension - 5/5 on left and 5/5 on right  Finger abduction - 5/5 on left and 5/5 on right    Special Tests:  Empty can test - negative  Full can test - negative  Bear hug test - negative  Belly press test - negative  Resisted internal rotation - negative  Resisted external rotation - negative    Neer's  test - negative  Hawkin's-Jose C test - negative  Cross-arm test- negative    OAidens test - negative    Sulcus sign - none  AP load and shift laxity - none  Anterior apprehension test - negative  Posterior apprehension test - negative    Speed's test - negative  Yergason's test - negative    Neurovascular Exam:  Spurlings test - negative bialterally  2+ radial pulses bilaterally  Sensation intact to light touch in the distal median, radial, and ulnar nerve distributions bilaterally.  2+/4 reflexes at triceps, biceps, and brachioradialis  Capillary refill intact <2 seconds in all digits bilaterally    TART (Tissue texture abnormality, Asymmetry,  Restriction of motion and/or Tenderness) changes:     Thoracic Spine   T1 Neutral   T2 Neutral   T3 Neutral   T4 Neutral   T5 FRS RIGHT   T6 FRS RIGHT   T7 FRS RIGHT   T8 FRS RIGHT   T9 Neutral   T10 Neutral   T11 Neutral   T12 NS-left,R-right     Rib cage: R1 inhaled on right, R7-8 external torsion on right     Lumbar Spine   L1 NS-left,R-right   L2 NS-left,R-right   L3 Neutral   L4 FRS RIGHT   L5 FRS RIGHT       Pelvis:  · Innominate:Right anterior rotation  · Pubic bone:Right inferior pubic shear    Sacrum:Right on Right sacral torsion    Lower extremity: Left anterior talus    Key   F= Flexed   E = Extended   R = Rotated   S = Sidebent   TTA = tissue texture abnormality     IMAGIN. X-ray ordered due to left knee pain. (AP bilateral standing, PA bilateral standing in flexion, bilateral merchants, and  left lateral views) taken today.   2. X-ray images were reviewed personally by me and then directly with patient.  3. FINDINGS: X-ray images obtained demonstrate mild DJD.  No significant joint space narrowing.  No fracture or dislocation.  No bone destruction identified  4. IMPRESSION: No acute pathology or irregularities appreciated.  Mild DJD bilaterally.       Assessment:      Encounter Diagnoses   Name Primary?    Acute pain of left knee Yes    Chronic  right shoulder pain     History of mastectomy, bilateral     Poor posture     Myalgia     Somatic dysfunction of thoracic region     Somatic dysfunction of rib cage region     Somatic dysfunction of lumbar region     Sacral region somatic dysfunction     Somatic dysfunction of pelvic region     Somatic dysfunction of lower extremity           Plan:     1.  Intermittent left knee swelling and mild pain likely secondary to biomechanical restrictions of the lower kinetic chain, with no significant arthritis on x-ray today and no irregularities noted on today's physical exam.  - OMT performed today to address associated biomechanical restrictions  and HEP started.   - Recommend Glucosamine Condroitin supplement daily x 1 month, if improving symptoms then continue supplement   - Recommend OTC compression brace and Ice up to 20 minutes at a time if knee begins to swell again  - Continue activity as tolerated, including yoga practice and weight bearing exercise routine  - X-ray images of left knee taken today (AP bilateral standing, PA bilateral standing in flexion, bilateral merchants, and  left lateral views) showed no acute pathology or irregularities appreciated.  Mild DJD bilaterally. . Images were personally reviewed with patient.    2.  Chronic right shoulder pain secondary to upper thoracic and scapular biomechanical restrictions and poor posture.  Associated fascial restrictions likely secondary to history of double mastectomy surgery 5 years ago.   - OMT performed today to address associated biomechanical restrictions  and HEP started.   - Continue activity as tolerated, including yoga practice  - Recommend Ice up to 20 minutes at a time prn for pain control    3. OMT 5-6 regions. Oral consent obtained.  Reviewed benefits and potential side effects.   - OMT indicated today due to signs and symptoms as well as local and remote somatic dysfunction findings and their related neurokinetic, lymphatic, fascial  and/or arteriovenous body connections.   - OMT techniques used: Myofascial Release, Muscle Energy and Still's Technique   - Treatment was tolerated well. Improvement noted in segmental mobility post-treatment in dysfunctional regions. There were no adverse events and no complications immediately following treatment.     4. Pt. Given the following HEP:  A) Quadratus lumborum self-stretch on all fours: hold stretch for 30 seconds, repeating 2-3 times on each side. Do stretch twice daily. Hand-out also given.   B)  Pelvic clock exercises given to do from the 6-12 o'clock positions:10-15 reps, twice daily. Hand out of exercise also given.   C)  Supine Thoracic extension exercise: 10-15 reps, twice daily. Handout also given.     [address] HOME EXERCISE PROGRAM (HEP):  The patient was taught a homegoing physical therapy regimen as described above. The patient demonstrated understanding of the exercises and proper technique of their execution. This interaction took 15 minutes.     5. Follow-up in 2-3 weeks for reevaluation    6. Patient agreeable to today's plan and all questions were answered    This note is dictated using the M*Modal Fluency Direct word recognition program. There are word recognition mistakes that are occasionally missed on review.

## 2019-09-04 ENCOUNTER — OFFICE VISIT (OUTPATIENT)
Dept: FAMILY MEDICINE | Facility: CLINIC | Age: 69
End: 2019-09-04
Payer: MEDICARE

## 2019-09-04 VITALS
DIASTOLIC BLOOD PRESSURE: 62 MMHG | SYSTOLIC BLOOD PRESSURE: 138 MMHG | OXYGEN SATURATION: 98 % | RESPIRATION RATE: 17 BRPM | WEIGHT: 136 LBS | HEART RATE: 61 BPM | HEIGHT: 63 IN | BODY MASS INDEX: 24.1 KG/M2

## 2019-09-04 DIAGNOSIS — Z23 NEED FOR TETANUS BOOSTER: ICD-10-CM

## 2019-09-04 DIAGNOSIS — E04.1 THYROID NODULE: ICD-10-CM

## 2019-09-04 DIAGNOSIS — I70.0 AORTIC ATHEROSCLEROSIS: ICD-10-CM

## 2019-09-04 DIAGNOSIS — R13.10 DYSPHAGIA, UNSPECIFIED TYPE: ICD-10-CM

## 2019-09-04 DIAGNOSIS — Z86.000 HISTORY OF DUCTAL CARCINOMA IN SITU (DCIS) OF BREAST: ICD-10-CM

## 2019-09-04 DIAGNOSIS — Z85.3 HISTORY OF BREAST CANCER: ICD-10-CM

## 2019-09-04 DIAGNOSIS — L65.9 ALOPECIA: ICD-10-CM

## 2019-09-04 DIAGNOSIS — Z78.0 POSTMENOPAUSAL: ICD-10-CM

## 2019-09-04 DIAGNOSIS — Z12.11 ENCOUNTER FOR SCREENING FOR MALIGNANT NEOPLASM OF COLON: Primary | ICD-10-CM

## 2019-09-04 PROBLEM — E04.9 GOITER: Status: RESOLVED | Noted: 2018-07-09 | Resolved: 2019-09-04

## 2019-09-04 PROCEDURE — 99499 RISK ADDL DX/OHS AUDIT: ICD-10-PCS | Mod: HCWC,S$GLB,, | Performed by: FAMILY MEDICINE

## 2019-09-04 PROCEDURE — 99499 UNLISTED E&M SERVICE: CPT | Mod: HCWC,S$GLB,, | Performed by: FAMILY MEDICINE

## 2019-09-04 PROCEDURE — 1101F PR PT FALLS ASSESS DOC 0-1 FALLS W/OUT INJ PAST YR: ICD-10-PCS | Mod: HCWC,CPTII,S$GLB, | Performed by: FAMILY MEDICINE

## 2019-09-04 PROCEDURE — 99213 PR OFFICE/OUTPT VISIT, EST, LEVL III, 20-29 MIN: ICD-10-PCS | Mod: HCWC,S$GLB,, | Performed by: FAMILY MEDICINE

## 2019-09-04 PROCEDURE — 99213 OFFICE O/P EST LOW 20 MIN: CPT | Mod: HCWC,S$GLB,, | Performed by: FAMILY MEDICINE

## 2019-09-04 PROCEDURE — 1101F PT FALLS ASSESS-DOCD LE1/YR: CPT | Mod: HCWC,CPTII,S$GLB, | Performed by: FAMILY MEDICINE

## 2019-09-04 RX ORDER — GLUCOSAMINE/CHONDRO SU A 500-400 MG
1 TABLET ORAL 3 TIMES DAILY
COMMUNITY

## 2019-09-04 NOTE — PROGRESS NOTES
[facility] - Clinic Note    Subjective      Ms. Joseph is a 69 y.o. female who presents to clinic to establish care.    Medical history:  Allergies since childhood  Pregnancy history:   Cholelithiasis:  during pregnancy 1 and after delivery 3, 2005 - another kidney stone - lithotripsy    Surgeries:  Hemorrhoidectomy 1995 - [facility]   - inguinal hernia repair     History of breast cancer  Lumpectomy and radiation, negative for brca  Has dry eyes and is being followed by Dr. Jimmy Moraes for this issue    Endrocrine  Osteoporosis  Thyroid nodule  Hair loss in   Treated by endocrinologist      - had a second breast cancer diagnosis of a different cell type - bilateral mastectomy with abdominal tissue - Dr. Lui monitors her once a year     Other  Taking AG Pro dietary supplements     GERD  - once every six months will have something usually like protein that feels very stuck in her esophagus - she will vomit and it will feel better      [facility] Eva has a past medical history of Breast cancer (), Depression, Hypertriglyceridemia (2019), Nephrolithiasis (2015), and Osteopenia.   PSX Eva has a past surgical history that includes Breast lumpectomy (); Hysterectomy (); Bilateral oophorectomy; Femoral hernia (); Mastectomy ( ); Tonsillectomy; Adenoidectomy; Hemorrhoid surgery; Breast reconstruction (Bilateral, ); and Oophorectomy.    Eva's family history includes Breast cancer in her maternal grandmother and mother; Cancer in her maternal aunt and maternal grandfather; Heart disease (age of onset: 56) in her father; Hyperlipidemia in her son; Hypertension in her father; No Known Problems in her brother, daughter, daughter, sister, and sister; Ovarian cancer in her maternal aunt; Parkinsonism in her paternal grandfather; Skin cancer in her brother; Stroke in her paternal grandmother.    Eva reports that she has never smoked. She has never  "used smokeless tobacco. She reports that she drinks about 0.6 oz of alcohol per week. She reports that she does not use drugs.   BILLY Velasquez is allergic to sulfa (sulfonamide antibiotics).   AMADOR Velasquez has a current medication list which includes the following prescription(s): amino acids-minerals, aspirin, biotin, calcium carbonate/vitamin d3, cholecalciferol (vitamin d3), co-enzyme q-10, coenzyme q10-vitamin e, estradiol, fish oil-dha-epa, glucosamine-chondroitin, minoxidil, and multivitamin.     Review of Systems   Constitutional: Negative for chills, fever and unexpected weight change.   Gastrointestinal:        Occasionally will get food stuck in her esophagus   Endocrine:        Hair loss occasional   ROS negative     Objective     /62 (BP Location: Left arm, Patient Position: Sitting, BP Method: Medium (Automatic))   Pulse 61   Resp 17   Ht 5' 3" (1.6 m)   Wt 61.7 kg (136 lb)   LMP  (LMP Unknown)   SpO2 98%   BMI 24.09 kg/m²     Physical Exam   Constitutional: She is well-developed, well-nourished, and in no distress. No distress.   HENT:   Head: Normocephalic and atraumatic.   Right Ear: External ear normal.   Left Ear: External ear normal.   Nose: Nose normal.   Mouth/Throat: Oropharynx is clear and moist.   Eyes: Pupils are equal, round, and reactive to light. Conjunctivae are normal.   Neck: No thyromegaly present.   Cardiovascular: Normal rate, regular rhythm, normal heart sounds and intact distal pulses. Exam reveals no friction rub.   No murmur heard.  Pulmonary/Chest: Effort normal and breath sounds normal. She has no rales.   Abdominal: Soft. Bowel sounds are normal. She exhibits no distension.   Musculoskeletal: She exhibits no edema.   Lymphadenopathy:     She has no cervical adenopathy.   Neurological: She is alert.   Skin: Skin is warm and dry.   Psychiatric: Affect normal.   Vitals reviewed.    Assessment/Plan     1. Encounter for screening for malignant neoplasm of colon   "   2. Need for tetanus booster  (In Office Administered) Tdap Vaccine   3. Alopecia     4. Aortic atherosclerosis     5. Postmenopausal     6. History of breast cancer     7. History of ductal carcinoma in situ (DCIS) of breast     8. Thyroid nodule     9. Dysphagia, unspecified type           Follow up in about 6 months (around 3/4/2020).    Future Appointments   Date Time Provider Department Center   9/12/2019  2:00 PM Dulce Maria Rodríguez DO [facility]TH[facility] [place]   9/16/2019 10:00 AM An[facility] Alicea MD [facility]   1/16/2020  1:00 PM LAB, [facility] [place]   1/22/2020  2:00 PM LEYLA Ghotra PA-C [facility] [place]       Anupama Alicea MD  Family Medicine  [facility]  [phone]

## 2019-09-12 ENCOUNTER — PATIENT OUTREACH (OUTPATIENT)
Dept: ADMINISTRATIVE | Facility: HOSPITAL | Age: 69
End: 2019-09-12

## 2019-09-12 ENCOUNTER — OFFICE VISIT (OUTPATIENT)
Dept: ORTHOPEDICS | Facility: CLINIC | Age: 69
End: 2019-09-12
Payer: MEDICARE

## 2019-09-12 VITALS
WEIGHT: 136 LBS | DIASTOLIC BLOOD PRESSURE: 90 MMHG | HEIGHT: 63 IN | SYSTOLIC BLOOD PRESSURE: 130 MMHG | BODY MASS INDEX: 24.1 KG/M2

## 2019-09-12 DIAGNOSIS — M99.01 CERVICAL (NECK) REGION SOMATIC DYSFUNCTION: ICD-10-CM

## 2019-09-12 DIAGNOSIS — Z90.13 HISTORY OF MASTECTOMY, BILATERAL: ICD-10-CM

## 2019-09-12 DIAGNOSIS — M99.02 SOMATIC DYSFUNCTION OF THORACIC REGION: ICD-10-CM

## 2019-09-12 DIAGNOSIS — G89.29 CHRONIC RIGHT SHOULDER PAIN: Primary | ICD-10-CM

## 2019-09-12 DIAGNOSIS — M79.10 MYALGIA: ICD-10-CM

## 2019-09-12 DIAGNOSIS — R29.3 POOR POSTURE: ICD-10-CM

## 2019-09-12 DIAGNOSIS — M25.511 CHRONIC RIGHT SHOULDER PAIN: Primary | ICD-10-CM

## 2019-09-12 DIAGNOSIS — M25.562 ACUTE PAIN OF LEFT KNEE: ICD-10-CM

## 2019-09-12 DIAGNOSIS — M99.07 UPPER EXTREMITY SOMATIC DYSFUNCTION: ICD-10-CM

## 2019-09-12 DIAGNOSIS — M99.05 SOMATIC DYSFUNCTION OF PELVIC REGION: ICD-10-CM

## 2019-09-12 DIAGNOSIS — M99.08 SOMATIC DYSFUNCTION OF RIB CAGE REGION: ICD-10-CM

## 2019-09-12 PROCEDURE — 98927 PR OSTEOPATHIC MANIP,5-6 BODY REGN: ICD-10-PCS | Mod: HCNC,S$GLB,, | Performed by: NEUROMUSCULOSKELETAL MEDICINE & OMM

## 2019-09-12 PROCEDURE — 99213 PR OFFICE/OUTPT VISIT, EST, LEVL III, 20-29 MIN: ICD-10-PCS | Mod: 25,HCNC,S$GLB, | Performed by: NEUROMUSCULOSKELETAL MEDICINE & OMM

## 2019-09-12 PROCEDURE — 1101F PT FALLS ASSESS-DOCD LE1/YR: CPT | Mod: HCNC,CPTII,S$GLB, | Performed by: NEUROMUSCULOSKELETAL MEDICINE & OMM

## 2019-09-12 PROCEDURE — 98927 OSTEOPATH MANJ 5-6 REGIONS: CPT | Mod: HCNC,S$GLB,, | Performed by: NEUROMUSCULOSKELETAL MEDICINE & OMM

## 2019-09-12 PROCEDURE — 99999 PR PBB SHADOW E&M-EST. PATIENT-LVL II: ICD-10-PCS | Mod: PBBFAC,HCNC,, | Performed by: NEUROMUSCULOSKELETAL MEDICINE & OMM

## 2019-09-12 PROCEDURE — 99999 PR PBB SHADOW E&M-EST. PATIENT-LVL II: CPT | Mod: PBBFAC,HCNC,, | Performed by: NEUROMUSCULOSKELETAL MEDICINE & OMM

## 2019-09-12 PROCEDURE — 99213 OFFICE O/P EST LOW 20 MIN: CPT | Mod: 25,HCNC,S$GLB, | Performed by: NEUROMUSCULOSKELETAL MEDICINE & OMM

## 2019-09-12 PROCEDURE — 1101F PR PT FALLS ASSESS DOC 0-1 FALLS W/OUT INJ PAST YR: ICD-10-PCS | Mod: HCNC,CPTII,S$GLB, | Performed by: NEUROMUSCULOSKELETAL MEDICINE & OMM

## 2019-09-12 NOTE — PROGRESS NOTES
Subjective:     Eva Joseph     Chief Complaint   Patient presents with    Follow-up     L knee       HPI      Eva is a 69 y.o. female coming in today for left knee and right shoulder pain. Since last visit the pain has Improved. She is doing the HEP and continues to be active with exercise and line dancing. Her left knee swelling has resolved. Her neck and shoulder still feel stiff, but has noted increased ROM. She is taking glucosamine chondroitin. The pain is better with rest, HEP and worse with activity. Pt. describes the pain as a 0/10 currently. 2/10 at worst. There has not been any new a fall/injury/ or traumas since last visit.  Pt. denies any new musculoskeletal complaints at this time.     Office note from 8/22/19 reviewed    Review of Systems   Constitutional: Negative for chills and fever.   Musculoskeletal: Positive for joint pain and myalgias. Negative for back pain, falls and neck pain.   Neurological: Negative for dizziness, tingling, focal weakness, weakness and headaches.       PAST MEDICAL HISTORY:   Past Medical History:   Diagnosis Date    Breast cancer 2000    also in 2013    Depression     Hypertriglyceridemia 7/21/2019    Nephrolithiasis 2/5/2015    Osteopenia      PAST SURGICAL HISTORY:   Past Surgical History:   Procedure Laterality Date    ADENOIDECTOMY      BILATERAL OOPHORECTOMY      benign    BREAST LUMPECTOMY  2000    Right breast    BREAST RECONSTRUCTION Bilateral 2013    Femoral hernia  2010    right side repair    HEMORRHOID SURGERY      HYSTERECTOMY  1998    KELSEY, fibroid    MASTECTOMY  2013     bilateral     OOPHORECTOMY      TONSILLECTOMY       FAMILY HISTORY:   Family History   Problem Relation Age of Onset    Breast cancer Mother         Breast cancer-75yrs    Hypertension Father     Heart disease Father 56        MI    Cancer Maternal Aunt         ovarian    Ovarian cancer Maternal Aunt     Cancer Maternal Grandfather         testicular /  prostate    Stroke Paternal Grandmother     Breast cancer Maternal Grandmother         Breast cancer-42yrs    Parkinsonism Paternal Grandfather     No Known Problems Sister     Skin cancer Brother     Hyperlipidemia Son         As a child    No Known Problems Daughter     No Known Problems Daughter     No Known Problems Sister     No Known Problems Brother     Amblyopia Neg Hx     Blindness Neg Hx     Cataracts Neg Hx     Diabetes Neg Hx     Glaucoma Neg Hx     Macular degeneration Neg Hx     Retinal detachment Neg Hx     Strabismus Neg Hx     Thyroid disease Neg Hx     Colon cancer Neg Hx      SOCIAL HISTORY:   Social History     Socioeconomic History    Marital status:      Spouse name: Not on file    Number of children: Not on file    Years of education: Not on file    Highest education level: Not on file   Occupational History    Not on file   Social Needs    Financial resource strain: Not on file    Food insecurity:     Worry: Not on file     Inability: Not on file    Transportation needs:     Medical: Not on file     Non-medical: Not on file   Tobacco Use    Smoking status: Never Smoker    Smokeless tobacco: Never Used   Substance and Sexual Activity    Alcohol use: Yes     Alcohol/week: 0.6 oz     Types: 1 Glasses of wine per week     Comment: Occasionally    Drug use: No    Sexual activity: Yes     Partners: Male     Birth control/protection: Post-menopausal     Comment:    Lifestyle    Physical activity:     Days per week: Not on file     Minutes per session: Not on file    Stress: Not on file   Relationships    Social connections:     Talks on phone: Not on file     Gets together: Not on file     Attends Faith service: Not on file     Active member of club or organization: Not on file     Attends meetings of clubs or organizations: Not on file     Relationship status: Not on file   Other Topics Concern    Not on file   Social History Narrative    Not  "on file       MEDICATIONS:   Current Outpatient Medications:     amino acids-minerals (A/G PRO) Tab, Take 2 tablets by mouth 3 (three) times daily., Disp: , Rfl:     aspirin (ECOTRIN) 81 MG EC tablet, Take 81 mg by mouth once daily., Disp: , Rfl:     biotin 10,000 mcg TbDL, Take 1 tablet by mouth once daily., Disp: , Rfl:     CALCIUM CARBONATE/VITAMIN D3 (CALCIUM 500 + D ORAL), Take by mouth. 1 Capsule Oral Every evening, Disp: , Rfl:     cholecalciferol, vitamin D3, (VITAMIN D3) 2,000 unit Cap, Take 1 capsule by mouth once daily., Disp: , Rfl:     co-enzyme Q-10 30 mg capsule, , Disp: , Rfl:     coenzyme Q10-vitamin E (COQ10 ) 100-100 mg-unit Cap, Take by mouth. 1 Capsule Oral Every day, Disp: , Rfl:     estradiol (IMVEXXY MAINTENANCE PACK) 10 mcg Inst, Place 1 suppository vaginally twice a week., Disp: 8 each, Rfl: 11    fish oil-dha-epa 1,200-144-216 mg Cap, Take by mouth. 1 Capsule Oral Every day, Disp: , Rfl:     glucosamine-chondroitin 500-400 mg tablet, Take 1 tablet by mouth 3 (three) times daily., Disp: , Rfl:     minoxidil (ROGAINE) 5 % Foam, Apply topically once daily., Disp: , Rfl:     multivitamin (THERAGRAN) per tablet, Take by mouth. 1 Tablet Oral Every day, Disp: , Rfl:   ALLERGIES:   Review of patient's allergies indicates:   Allergen Reactions    Sulfa (sulfonamide antibiotics) Other (See Comments)       Objective:     VITAL SIGNS: BP (!) 130/90   Ht 5' 3" (1.6 m)   Wt 61.7 kg (136 lb 0.4 oz)   LMP  (LMP Unknown)   BMI 24.10 kg/m²    General    Vitals reviewed.  Constitutional: She is oriented to person, place, and time. She appears well-developed and well-nourished.   Neurological: She is alert and oriented to person, place, and time.   Psychiatric: She has a normal mood and affect. Her behavior is normal.               MUSCULOSKELETAL EXAM    Left KNEE EXAMINATION   Affected side is compared to contralateral knee     Observation:  No edema, erythema, ecchymosis, or effusion " noted.  No muscle atrophy of the thighs and calves noted.  No obvious bony deformities noted.   No Genu valgus/varum noted.  No recurvatum noted.    No tibial internal/external torsion.    Posture:  Increased thoracic kyphosis, Anterior head carriage and Posterior pelvis tilt with loss of lumbar lordosis  Gait: Non-antalgic with Neutral ankle mechanics and Neutral medial arch    Tenderness:  Patella - none    Lateral joint line - none  Quad tendon - none   Medial joint line - none  Patellar tendon - none   Medial plica - none  Tibial tubercle - none   Lateral plica - none  Pes anserine - none   MCL prox - none  Distal ITB - none   MCL distal - none  MFC - none    LCL prox - none  LFC - none    LCL distal - none  Tibia - none    Fibula - none    No obvious bursae, plicae, popliteal cysts, or tendon derangement palpated.          ROM (* = with pain):   Active extension to 0° on left without hyperextension, lag, crepitus, or patellar J sign.   Active extension to 0° on right without hyperextension, lag, crepitus, or patellar J sign.  Active flexion to 135° on left and 135° on right    Strength(* = with pain):  Knee Flexion - 5/5 on left and 5/5 on right  Knee Extension - 5/5 on left and 5/5 on right  Hip Flexion - 5/5 on left and 5/5 on right  Hip Extension - 5/5 on left and 5/5 on right  Ankle dorsiflexion - 5/5 on left and 5/5 on right  Ankle Plantarflexion - 5/5 on left and 5/5 on right  glutaeus medius - 5/5 on left and 5/5 on right    Patellofemoral Exam:   Patellar ballottement - negative  Bulge sign - negative  Patellar grind - negative    No patellar laxity with medial and lateral translation   No apprehension with medial and lateral patellar translation.     Meniscus Testing:     No pain with terminal extension and flexion at joint lines.  Nancys test - negative   Thesaly test - negative  Bounce home test - negative    Ligament Testing:  Lachman's test - negative  No laxity with anterior drawer.  No laxity  with posterior drawer.    No posterior sag sign.   Prone dial testing - negative  No laxity with varus testing at 0 and 30 degrees.  No laxity with valgus testing at 0 and 30 degrees.    IT band testing:  Catinas test - negative   Noble Compression test - negative    Shoulder: right SHOULDER  The affected shoulder is compared to the contralateral shoulder.    Observation:      SHOULDER  No ecchymosis, edema, or erythema throughout the shoulder girdle.  No sternal, clavicular, or acromial deformities bilaterally.  No atrophy of the pectorals, deltoids, supraspinatus, infraspinatus, or biceps bilaterally.  Increased right upper trapezius and levator scapulae tone, right anterior shoulder girdle - improved    ROM (* = with pain):  CERVICAL SPINE  Full AROM in flexion, extension, sidebending, and rotation.    SHOULDER  Active flexion to 180° on left and 180° on right* (tightness - improved following OMT)  Active abduction to 180° on left and 180° on right* (tightness - improved following OMT).  Active internal rotation to T7 on left and T8 on right.    Active external rotation to T4 on left and T4 on right.    No scapular dyskinesia or winging.    Tenderness:  No tenderness at the SC or AC joint  No tenderness over the clavicle   No tenderness over biceps tendon or bicipital groove  No tenderness over subacromial space    Strength Testing (* = with pain):  Deltoid - 5/5 on left and 5/5 on right  Biceps - 5/5 on left and 5/5 on right  Triceps - 5/5 on left and 5/5 on right  Wrist extension - 5/5 on left and 5/5 on right  Wrist flexion - 5/5 on left and 5/5 on right   - 5/5 on left and 5/5 on right  Finger extension - 5/5 on left and 5/5 on right  Finger abduction - 5/5 on left and 5/5 on right    Special Tests:  Empty can test - negative  Full can test - negative  Bear hug test - negative  Belly press test - negative  Resisted internal rotation - negative  Resisted external rotation - negative    Neer's test -  positive - improved following OMT  Hawkin's-Jose C test - negative  Cross-arm test- negative    OAidens test - negative    Sulcus sign - none  AP load and shift laxity - none  Anterior apprehension test - negative  Posterior apprehension test - negative    Speed's test - negative  Yergason's test - negative    Neurovascular Exam:  Spurlings test - negative bialterally  2+ radial pulses bilaterally  Sensation intact to light touch in the distal median, radial, and ulnar nerve distributions bilaterally.  2+/4 reflexes at triceps, biceps, and brachioradialis  Capillary refill intact <2 seconds in all digits bilaterally    TART (Tissue texture abnormality, Asymmetry,  Restriction of motion and/or Tenderness) changes:    Head: Occipitoatlantal (OA) Joint Neutral     Cervical Spine   C1 Neutral   C2 Neutral   C3 TTA RIGHT   C4 TTA RIGHT   C5 TTA RIGHT   C6 TTA RIGHT   C7 TTA RIGHT   Right upper trapezius and posterior scalene Trigger band (TB) fascial distortions     Thoracic Spine   T1 Neutral   T2 TTA RIGHT   T3 TTA RIGHT   T4 TTA RIGHT   T5 Neutral   T6 Neutral   T7 FRS RIGHT   T8 Neutral   T9 Neutral   T10 Neutral   T11 Neutral   T12 Neutral     Rib cage: R1 inhaled on right, R7-8 external torsion on right    Upper extremity: Right scapular tectonic fixation  fascial distortion, right anterior shoulder Trigger band (TB) fascial distortion      Lumbar Spine   L1 Neutral   L2 Neutral   L3 Neutral   L4 Neutral   L5 Neutral     Pelvis:  · Innominate:Right anterior rotation  · Pubic bone:Right inferior pubic shear    Sacrum:Neutral      Key   F= Flexed   E = Extended   R = Rotated   S = Sidebent   TTA = tissue texture abnormality       Assessment:      Encounter Diagnoses   Name Primary?    Chronic right shoulder pain Yes    History of mastectomy, bilateral     Poor posture     Acute pain of left knee     Myalgia     Cervical (neck) region somatic dysfunction     Somatic dysfunction of thoracic region      Somatic dysfunction of rib cage region     Somatic dysfunction of pelvic region     Upper extremity somatic dysfunction           Plan:   1.  Chronic right shoulder pain secondary to upper thoracic and scapular biomechanical restrictions and poor posture-improved.  Associated fascial restrictions likely secondary to history of double mastectomy surgery 5 years ago.   - OMT performed again today to address associated biomechanical restrictions  and HEP reviewed  - Continue activity as tolerated, including yoga practice  - Recommend Ice up to 20 minutes at a time prn for pain control    2.  Intermittent left knee swelling and mild pain resolved,  likely secondary to biomechanical restrictions of the lower kinetic chain, with no significant arthritis on x-ray and no irregularities noted on  physical exam.  - Continue Glucosamine Condroitin supplement daily x 1 month, if improving symptoms then continue supplement   - Recommend OTC compression brace and Ice up to 20 minutes at a time if knee begins to swell again  - Continue activity as tolerated, including yoga practice and weight bearing exercise routine  - X-ray images of left knee taken 8/22/19(AP bilateral standing, PA bilateral standing in flexion, bilateral merchants, and  left lateral views) showed no acute pathology or irregularities appreciated.  Mild DJD bilaterally. Images were personally reviewed     3. OMT 5-6 regions. Oral consent obtained.  Reviewed benefits and potential side effects, , including bruising at site of treatment and increased soreness for the next 24-48 hours. Pt. Instructed to increased water intake by 1 L today and tomorrow to help with any residual soreness.   - OMT indicated today due to signs and symptoms as well as local and remote somatic dysfunction findings and their related neurokinetic, lymphatic, fascial and/or arteriovenous body connections.   - OMT techniques used: Myofascial Release, Muscle Energy, Still's Technique and  Fascial Distortion Model   - Treatment was tolerated well. Improvement noted in segmental mobility post-treatment in dysfunctional regions. There were no adverse events and no complications immediately following treatment.     4.  Reviewed with patient the following HEP:  Continue:  A) Quadratus lumborum self-stretch on all fours: hold stretch for 30 seconds, repeating 2-3 times on each side. Do stretch twice daily. Hand-out also given.   B)  Pelvic clock exercises given to do from the 6-12 o'clock positions:10-15 reps, twice daily. Hand out of exercise also given.   C)  Supine Thoracic extension exercise: 10-15 reps, twice daily. Handout also given.       The patient was taught a homegoing physical therapy regimen as described above. The patient demonstrated understanding of the exercises and proper technique of their execution.    5. Follow-up in 4-6 weeks for reevaluation    6. Patient agreeable to today's plan and all questions were answered    This note is dictated using the M*Modal Fluency Direct word recognition program. There are word recognition mistakes that are occasionally missed on review.

## 2019-09-16 ENCOUNTER — OFFICE VISIT (OUTPATIENT)
Dept: FAMILY MEDICINE | Facility: CLINIC | Age: 69
End: 2019-09-16
Payer: MEDICARE

## 2019-09-16 VITALS
RESPIRATION RATE: 11 BRPM | WEIGHT: 135 LBS | OXYGEN SATURATION: 95 % | HEART RATE: 63 BPM | HEIGHT: 63 IN | BODY MASS INDEX: 23.92 KG/M2 | DIASTOLIC BLOOD PRESSURE: 76 MMHG | TEMPERATURE: 98 F | SYSTOLIC BLOOD PRESSURE: 147 MMHG

## 2019-09-16 DIAGNOSIS — L30.9 DERMATITIS: Primary | ICD-10-CM

## 2019-09-16 PROCEDURE — 99212 OFFICE O/P EST SF 10 MIN: CPT | Mod: HCWC,S$GLB,, | Performed by: FAMILY MEDICINE

## 2019-09-16 PROCEDURE — 99212 PR OFFICE/OUTPT VISIT, EST, LEVL II, 10-19 MIN: ICD-10-PCS | Mod: HCWC,S$GLB,, | Performed by: FAMILY MEDICINE

## 2019-09-16 PROCEDURE — 1101F PT FALLS ASSESS-DOCD LE1/YR: CPT | Mod: HCWC,CPTII,S$GLB, | Performed by: FAMILY MEDICINE

## 2019-09-16 PROCEDURE — 1101F PR PT FALLS ASSESS DOC 0-1 FALLS W/OUT INJ PAST YR: ICD-10-PCS | Mod: HCWC,CPTII,S$GLB, | Performed by: FAMILY MEDICINE

## 2019-09-16 RX ORDER — HYDROCORTISONE 25 MG/G
CREAM TOPICAL 2 TIMES DAILY PRN
Qty: 28 G | Refills: 1 | Status: SHIPPED | OUTPATIENT
Start: 2019-09-16 | End: 2022-05-18

## 2019-09-16 NOTE — PROGRESS NOTES
[facility] - Clinic Note    Subjective      Ms. Joseph is a 69 y.o. female who presents to clinic for ear itching.    Has noticed that her ears have been itching and flaking.  Has tried cream in the past that worked really well for her.  No drainage or pain. No other associated symptoms.      [facility] Eva has a past medical history of Breast cancer (2000), Depression, Hypertriglyceridemia (7/21/2019), Nephrolithiasis (2/5/2015), and Osteopenia.   PSXH Eva has a past surgical history that includes Breast lumpectomy (2000); Hysterectomy (1998); Bilateral oophorectomy; Femoral hernia (2010); Mastectomy (2013 ); Tonsillectomy; Adenoidectomy; Hemorrhoid surgery; Breast reconstruction (Bilateral, 2013); and Oophorectomy.    Eva's family history includes Breast cancer in her maternal grandmother and mother; Cancer in her maternal aunt and maternal grandfather; Heart disease (age of onset: 56) in her father; Hyperlipidemia in her son; Hypertension in her father; No Known Problems in her brother, daughter, daughter, sister, and sister; Ovarian cancer in her maternal aunt; Parkinsonism in her paternal grandfather; Skin cancer in her brother; Stroke in her paternal grandmother.    Eva reports that she has never smoked. She has never used smokeless tobacco. She reports that she drinks about 0.6 oz of alcohol per week. She reports that she does not use drugs.   ALG Eva is allergic to sulfa (sulfonamide antibiotics).   MED Eva has a current medication list which includes the following prescription(s): amino acids-minerals, aspirin, biotin, calcium carbonate/vitamin d3, cholecalciferol (vitamin d3), co-enzyme q-10, coenzyme q10-vitamin e, estradiol, fish oil-dha-epa, glucosamine-chondroitin, minoxidil, multivitamin, and hydrocortisone.     Review of Systems   HENT: Negative for ear discharge and ear pain.      Objective     BP (!) 147/76 (BP Location: Left arm, Patient Position: Sitting, BP  "Method: Medium (Automatic))   Pulse 63   Temp 98.3 °F (36.8 °C) (Oral)   Resp 11   Ht 5' 3" (1.6 m)   Wt 61.2 kg (135 lb)   LMP  (LMP Unknown)   SpO2 95%   BMI 23.91 kg/m²     Physical Exam   Constitutional: No distress.   HENT:   Head: Normocephalic and atraumatic.   Dry skin, itching, flaking bilateral ears.  Mild redness   Eyes: Conjunctivae are normal.   Cardiovascular: Intact distal pulses.   Neurological: She is alert.   Skin: Skin is warm and dry.   Psychiatric: Affect normal.   Vitals reviewed.    Assessment/Plan     1. Dermatitis  hydrocortisone 2.5 % cream         Future Appointments   Date Time Provider Department Center   10/10/2019  2:20 PM DO VICENTE Snider ORTHO [place]   1/16/2020  1:00 PM LAB, [facility] LAB [place]   1/22/2020  2:00 PM LEYLA Ghotra PA-C [facility] [place]       Anupama Alicea MD  Family Medicine  [facility]  [phone]    "

## 2019-09-25 LAB — Lab: NORMAL

## 2019-10-07 ENCOUNTER — PATIENT MESSAGE (OUTPATIENT)
Dept: FAMILY MEDICINE | Facility: CLINIC | Age: 69
End: 2019-10-07

## 2019-10-07 ENCOUNTER — PATIENT OUTREACH (OUTPATIENT)
Dept: ADMINISTRATIVE | Facility: HOSPITAL | Age: 69
End: 2019-10-07

## 2019-10-07 ENCOUNTER — PATIENT OUTREACH (OUTPATIENT)
Dept: ADMINISTRATIVE | Facility: OTHER | Age: 69
End: 2019-10-07

## 2019-10-20 ENCOUNTER — PATIENT OUTREACH (OUTPATIENT)
Dept: ADMINISTRATIVE | Facility: OTHER | Age: 69
End: 2019-10-20

## 2019-10-23 ENCOUNTER — OFFICE VISIT (OUTPATIENT)
Dept: ORTHOPEDICS | Facility: CLINIC | Age: 69
End: 2019-10-23
Payer: MEDICARE

## 2019-10-23 VITALS
DIASTOLIC BLOOD PRESSURE: 75 MMHG | SYSTOLIC BLOOD PRESSURE: 135 MMHG | BODY MASS INDEX: 23.92 KG/M2 | HEIGHT: 63 IN | WEIGHT: 135 LBS

## 2019-10-23 DIAGNOSIS — G89.29 CHRONIC RIGHT SHOULDER PAIN: Primary | ICD-10-CM

## 2019-10-23 DIAGNOSIS — M99.08 SOMATIC DYSFUNCTION OF RIB CAGE REGION: ICD-10-CM

## 2019-10-23 DIAGNOSIS — M25.511 CHRONIC RIGHT SHOULDER PAIN: Primary | ICD-10-CM

## 2019-10-23 DIAGNOSIS — M99.07 UPPER EXTREMITY SOMATIC DYSFUNCTION: ICD-10-CM

## 2019-10-23 DIAGNOSIS — M99.01 CERVICAL (NECK) REGION SOMATIC DYSFUNCTION: ICD-10-CM

## 2019-10-23 DIAGNOSIS — M99.02 SOMATIC DYSFUNCTION OF THORACIC REGION: ICD-10-CM

## 2019-10-23 DIAGNOSIS — M79.10 MYALGIA: ICD-10-CM

## 2019-10-23 DIAGNOSIS — R29.3 POOR POSTURE: ICD-10-CM

## 2019-10-23 DIAGNOSIS — Z90.13 HISTORY OF MASTECTOMY, BILATERAL: ICD-10-CM

## 2019-10-23 PROCEDURE — 98926 OSTEOPATH MANJ 3-4 REGIONS: CPT | Mod: HCNC,S$GLB,, | Performed by: NEUROMUSCULOSKELETAL MEDICINE & OMM

## 2019-10-23 PROCEDURE — 99213 OFFICE O/P EST LOW 20 MIN: CPT | Mod: 25,HCNC,S$GLB, | Performed by: NEUROMUSCULOSKELETAL MEDICINE & OMM

## 2019-10-23 PROCEDURE — 1101F PT FALLS ASSESS-DOCD LE1/YR: CPT | Mod: HCNC,CPTII,S$GLB, | Performed by: NEUROMUSCULOSKELETAL MEDICINE & OMM

## 2019-10-23 PROCEDURE — 99999 PR PBB SHADOW E&M-EST. PATIENT-LVL II: CPT | Mod: PBBFAC,HCNC,, | Performed by: NEUROMUSCULOSKELETAL MEDICINE & OMM

## 2019-10-23 PROCEDURE — 98926 PR OSTEOPATHIC MANIP,3-4 BODY REGN: ICD-10-PCS | Mod: HCNC,S$GLB,, | Performed by: NEUROMUSCULOSKELETAL MEDICINE & OMM

## 2019-10-23 PROCEDURE — 99213 PR OFFICE/OUTPT VISIT, EST, LEVL III, 20-29 MIN: ICD-10-PCS | Mod: 25,HCNC,S$GLB, | Performed by: NEUROMUSCULOSKELETAL MEDICINE & OMM

## 2019-10-23 PROCEDURE — 99999 PR PBB SHADOW E&M-EST. PATIENT-LVL II: ICD-10-PCS | Mod: PBBFAC,HCNC,, | Performed by: NEUROMUSCULOSKELETAL MEDICINE & OMM

## 2019-10-23 PROCEDURE — 1101F PR PT FALLS ASSESS DOC 0-1 FALLS W/OUT INJ PAST YR: ICD-10-PCS | Mod: HCNC,CPTII,S$GLB, | Performed by: NEUROMUSCULOSKELETAL MEDICINE & OMM

## 2019-10-23 NOTE — PROGRESS NOTES
Subjective:     Eva Joseph     Chief Complaint   Patient presents with    Right Shoulder - Pain       HPI      Eva is a 69 y.o. female coming in today for right shoulder pain. Since last visit the pain has Improved. The pain is better with rest, yoga, and HEP and worse with siting up for too long. Pt. describes the pain as a 1/10 dull pain that does not radiate. There has not been any new a fall/injury/ or traumas since last visit.  Pt. denies any new musculoskeletal complaints at this time. Pr reports yoga is help with the pain. Pt reports pain has gotten better since the last visit.    Office note from 9/12/19 reviewed        Review of Systems   Constitutional: Negative for chills and fever.   Musculoskeletal: Positive for joint pain and myalgias. Negative for back pain, falls and neck pain.   Neurological: Negative for dizziness, tingling, focal weakness, weakness and headaches.       PAST MEDICAL HISTORY:   Past Medical History:   Diagnosis Date    Breast cancer 2000    also in 2013    Depression     Hypertriglyceridemia 7/21/2019    Nephrolithiasis 2/5/2015    Osteopenia      PAST SURGICAL HISTORY:   Past Surgical History:   Procedure Laterality Date    ADENOIDECTOMY      BILATERAL OOPHORECTOMY      benign    BREAST LUMPECTOMY  2000    Right breast    BREAST RECONSTRUCTION Bilateral 2013    Femoral hernia  2010    right side repair    HEMORRHOID SURGERY      HYSTERECTOMY  1998    KELSEY, fibroid    MASTECTOMY  2013     bilateral     OOPHORECTOMY      TONSILLECTOMY       FAMILY HISTORY:   Family History   Problem Relation Age of Onset    Breast cancer Mother         Breast cancer-75yrs    Hypertension Father     Heart disease Father 56        MI    Cancer Maternal Aunt         ovarian    Ovarian cancer Maternal Aunt     Cancer Maternal Grandfather         testicular / prostate    Stroke Paternal Grandmother     Breast cancer Maternal Grandmother         Breast cancer-42yrs     Parkinsonism Paternal Grandfather     No Known Problems Sister     Skin cancer Brother     Hyperlipidemia Son         As a child    No Known Problems Daughter     No Known Problems Daughter     No Known Problems Sister     No Known Problems Brother     Amblyopia Neg Hx     Blindness Neg Hx     Cataracts Neg Hx     Diabetes Neg Hx     Glaucoma Neg Hx     Macular degeneration Neg Hx     Retinal detachment Neg Hx     Strabismus Neg Hx     Thyroid disease Neg Hx     Colon cancer Neg Hx      SOCIAL HISTORY:   Social History     Socioeconomic History    Marital status:      Spouse name: Not on file    Number of children: Not on file    Years of education: Not on file    Highest education level: Not on file   Occupational History    Not on file   Social Needs    Financial resource strain: Not on file    Food insecurity:     Worry: Not on file     Inability: Not on file    Transportation needs:     Medical: Not on file     Non-medical: Not on file   Tobacco Use    Smoking status: Never Smoker    Smokeless tobacco: Never Used   Substance and Sexual Activity    Alcohol use: Yes     Alcohol/week: 1.0 standard drinks     Types: 1 Glasses of wine per week     Comment: Occasionally    Drug use: No    Sexual activity: Yes     Partners: Male     Birth control/protection: Post-menopausal     Comment:    Lifestyle    Physical activity:     Days per week: Not on file     Minutes per session: Not on file    Stress: Not on file   Relationships    Social connections:     Talks on phone: Not on file     Gets together: Not on file     Attends Alevism service: Not on file     Active member of club or organization: Not on file     Attends meetings of clubs or organizations: Not on file     Relationship status: Not on file   Other Topics Concern    Not on file   Social History Narrative    Not on file       MEDICATIONS:   Current Outpatient Medications:     amino acids-minerals (A/G PRO)  "Tab, Take 2 tablets by mouth 3 (three) times daily., Disp: , Rfl:     aspirin (ECOTRIN) 81 MG EC tablet, Take 81 mg by mouth once daily., Disp: , Rfl:     biotin 10,000 mcg TbDL, Take 1 tablet by mouth once daily., Disp: , Rfl:     CALCIUM CARBONATE/VITAMIN D3 (CALCIUM 500 + D ORAL), Take by mouth. 1 Capsule Oral Every evening, Disp: , Rfl:     cholecalciferol, vitamin D3, (VITAMIN D3) 2,000 unit Cap, Take 1 capsule by mouth once daily., Disp: , Rfl:     co-enzyme Q-10 30 mg capsule, , Disp: , Rfl:     coenzyme Q10-vitamin E (COQ10 ) 100-100 mg-unit Cap, Take by mouth. 1 Capsule Oral Every day, Disp: , Rfl:     estradiol (IMVEXXY MAINTENANCE PACK) 10 mcg Inst, Place 1 suppository vaginally twice a week., Disp: 8 each, Rfl: 11    fish oil-dha-epa 1,200-144-216 mg Cap, Take by mouth. 1 Capsule Oral Every day, Disp: , Rfl:     glucosamine-chondroitin 500-400 mg tablet, Take 1 tablet by mouth 3 (three) times daily., Disp: , Rfl:     hydrocortisone 2.5 % cream, Apply topically 2 (two) times daily as needed. Prn itching, Disp: 28 g, Rfl: 1    minoxidil (ROGAINE) 5 % Foam, Apply topically once daily., Disp: , Rfl:     multivitamin (THERAGRAN) per tablet, Take by mouth. 1 Tablet Oral Every day, Disp: , Rfl:   ALLERGIES:   Review of patient's allergies indicates:   Allergen Reactions    Sulfa (sulfonamide antibiotics) Other (See Comments)       Objective:     VITAL SIGNS: /75 (BP Location: Right arm, Patient Position: Sitting, BP Method: Medium (Manual))   Ht 5' 3" (1.6 m)   Wt 61.2 kg (135 lb)   LMP  (LMP Unknown)   BMI 23.91 kg/m²    General    Vitals reviewed.  Constitutional: She is oriented to person, place, and time. She appears well-developed and well-nourished.   Neurological: She is alert and oriented to person, place, and time.   Psychiatric: She has a normal mood and affect. Her behavior is normal.               MUSCULOSKELETAL EXAM    Shoulder: right SHOULDER  The affected shoulder is " compared to the contralateral shoulder.    Observation:      SHOULDER  No ecchymosis, edema, or erythema throughout the shoulder girdle.  No sternal, clavicular, or acromial deformities bilaterally.  No atrophy of the pectorals, deltoids, supraspinatus, infraspinatus, or biceps bilaterally.  Increased right upper trapezius and levator scapulae tone, right anterior shoulder girdle - improved    ROM (* = with pain):  CERVICAL SPINE  Full AROM in flexion, extension, sidebending, and rotation.    SHOULDER  Active flexion to 180° on left and 180° on right   Active abduction to 180° on left and 180° on right  Active internal rotation to T7 on left and T10 on right* (improved to T8 following OMT).    Active external rotation to T4 on left and T4 on right.    + right scapular dyskinesia     Tenderness:  No tenderness at the SC or AC joint  No tenderness over the clavicle   No tenderness over biceps tendon or bicipital groove  No tenderness over subacromial space    Strength Testing (* = with pain):  Deltoid - 5/5 on left and 5/5 on right  Biceps - 5/5 on left and 5/5 on right  Triceps - 5/5 on left and 5/5 on right  Wrist extension - 5/5 on left and 5/5 on right  Wrist flexion - 5/5 on left and 5/5 on right   - 5/5 on left and 5/5 on right  Finger extension - 5/5 on left and 5/5 on right  Finger abduction - 5/5 on left and 5/5 on right    Special Tests:  Empty can test - negative  Full can test - negative  Bear hug test - negative  Belly press test - negative  Resisted internal rotation - negative  Resisted external rotation - negative    Neer's test - negative  Hawkin's-Jose C test - negative  Cross-arm test- negative    OAidens test - negative    Sulcus sign - none  AP load and shift laxity - none  Anterior apprehension test - negative  Posterior apprehension test - negative    Speed's test - negative  Yergason's test - negative    Neurovascular Exam:  Spurlings test - negative bialterally  2+ radial pulses  bilaterally  Sensation intact to light touch in the distal median, radial, and ulnar nerve distributions bilaterally.  2+/4 reflexes at triceps, biceps, and brachioradialis  Capillary refill intact <2 seconds in all digits bilaterally    TART (Tissue texture abnormality, Asymmetry,  Restriction of motion and/or Tenderness) changes:    Head: Occipitoatlantal (OA) Joint Neutral     Cervical Spine   C1 Neutral   C2 Neutral   C3 TTA RIGHT   C4 TTA RIGHT   C5 TTA RIGHT   C6 TTA RIGHT   C7 TTA RIGHT   Right upper trapezius and levator scapulae Trigger band (TB) fascial distortions     Thoracic Spine   T1 Neutral   T2 TTA RIGHT   T3 TTA RIGHT   T4 TTA RIGHT   T5 Neutral   T6 Neutral   T7 Neutral   T8 Neutral   T9 Neutral   T10 Neutral   T11 Neutral   T12 Neutral     Rib cage: R3 anterior TTA    Upper extremity: Right scapular tectonic fixation  fascial distortion, right anterior shoulder Trigger band (TB) fascial distortion, right thoracic outlet TTA      Key   F= Flexed   E = Extended   R = Rotated   S = Sidebent   TTA = tissue texture abnormality       Assessment:      Encounter Diagnoses   Name Primary?    Chronic right shoulder pain Yes    History of mastectomy, bilateral     Poor posture     Myalgia     Somatic dysfunction of thoracic region     Upper extremity somatic dysfunction     Somatic dysfunction of rib cage region     Cervical (neck) region somatic dysfunction           Plan:   1.  Chronic right shoulder pain secondary to upper thoracic and scapular biomechanical restrictions and poor posture- continued improvement.  Associated fascial restrictions likely secondary to history of double mastectomy surgery 5 years ago.    - OMT performed again today to address associated biomechanical restrictions  and HEP reviewed  - Continue activity as tolerated, including yoga practice  - Recommend Ice up to 20 minutes at a time prn for pain control    2. OMT 3-4 regions. Oral consent obtained.  Reviewed benefits  and potential side effects, , including bruising at site of treatment and increased soreness for the next 24-48 hours. Pt. Instructed to increased water intake by 1 L today and tomorrow to help with any residual soreness.   - OMT indicated today due to signs and symptoms as well as local and remote somatic dysfunction findings and their related neurokinetic, lymphatic, fascial and/or arteriovenous body connections.   - OMT techniques used: Myofascial Release and Fascial Distortion Model   - Treatment was tolerated well. Improvement noted in segmental mobility post-treatment in dysfunctional regions. There were no adverse events and no complications immediately following treatment.     3.  Reviewed with patient the following HEP:  Continue:  A) Quadratus lumborum self-stretch on all fours: hold stretch for 30 seconds, repeating 2-3 times on each side. Do stretch twice daily. Hand-out also given.   B)  Pelvic clock exercises given to do from the 6-12 o'clock positions:10-15 reps, twice daily. Hand out of exercise also given.   C)  Supine Thoracic extension exercise: 10-15 reps, twice daily. Handout also given.       The patient was taught a homegoing physical therapy regimen as described above. The patient demonstrated understanding of the exercises and proper technique of their execution.    4. Follow-up as needed if pain deteriorates or new issue arises    5. Patient agreeable to today's plan and all questions were answered    This note is dictated using the M*Modal Fluency Direct word recognition program. There are word recognition mistakes that are occasionally missed on review.

## 2019-12-06 ENCOUNTER — PATIENT OUTREACH (OUTPATIENT)
Dept: ADMINISTRATIVE | Facility: OTHER | Age: 69
End: 2019-12-06

## 2019-12-09 ENCOUNTER — OFFICE VISIT (OUTPATIENT)
Dept: OPTOMETRY | Facility: CLINIC | Age: 69
End: 2019-12-09
Payer: MEDICARE

## 2019-12-09 DIAGNOSIS — H04.123 BILATERAL DRY EYES: Primary | ICD-10-CM

## 2019-12-09 DIAGNOSIS — H25.13 NUCLEAR SCLEROSIS OF BOTH EYES: ICD-10-CM

## 2019-12-09 PROCEDURE — 99999 PR PBB SHADOW E&M-EST. PATIENT-LVL III: ICD-10-PCS | Mod: PBBFAC,HCNC,, | Performed by: OPTOMETRIST

## 2019-12-09 PROCEDURE — 68761 PR CLOSE TEAR DUCT OPENING BY PLUG,EA: ICD-10-PCS | Mod: HCNC,50,S$GLB, | Performed by: OPTOMETRIST

## 2019-12-09 PROCEDURE — 68761 CLOSE TEAR DUCT OPENING: CPT | Mod: HCNC,50,S$GLB, | Performed by: OPTOMETRIST

## 2019-12-09 PROCEDURE — 99499 NO LOS: ICD-10-PCS | Mod: HCNC,S$GLB,, | Performed by: OPTOMETRIST

## 2019-12-09 PROCEDURE — 99999 PR PBB SHADOW E&M-EST. PATIENT-LVL III: CPT | Mod: PBBFAC,HCNC,, | Performed by: OPTOMETRIST

## 2019-12-09 PROCEDURE — 99499 UNLISTED E&M SERVICE: CPT | Mod: HCNC,S$GLB,, | Performed by: OPTOMETRIST

## 2019-12-09 PROCEDURE — 99499 UNLISTED E&M SERVICE: CPT | Mod: S$GLB,,, | Performed by: OPTOMETRIST

## 2019-12-09 RX ORDER — EPINEPHRINE 0.22MG
AEROSOL WITH ADAPTER (ML) INHALATION
COMMUNITY
Start: 2019-09-25 | End: 2021-05-25 | Stop reason: SDUPTHER

## 2019-12-09 NOTE — PATIENT INSTRUCTIONS
Prior diagnosis of bilateral tear-deficient dry eye.  Mrs. Joseph previously had (presumably non-dissolvable) punctal plugs inserted, but no longer in place.  Suggest replace with medium-term dissolvable punctal plugs.  Mrs. Joseph agreeable.  Obtained signed informed consent.  Inserted one 0.4 mm VisiPlug lacrimal plug into the lower punctum bilaterally:       0.4mm Dissolvable VisiPlug          RLL  REF  1638                    Lot  [id number] - [id number] (!)                    2022-11-05          LLL   REF   1638                Lot [id number] - [id number] (!)                2022 -11- 05          Continue use of a medium viscosity artificial tear (Systane or Optive) in both eyes as needed throughout the day.  Return for replacement of punctal plugs in six months.

## 2019-12-09 NOTE — PROGRESS NOTES
Assessment /Plan     For exam results, see Encounter Report.    1. Bilateral dry eyes     2. Nuclear sclerosis of both eyes                    Prior diagnosis of bilateral tear-deficient dry eye.  Mrs. Joseph previously had (presumably non-dissolvable) punctal plugs inserted, but no longer in place.  Suggest replace with medium-term dissolvable punctal plugs.  Mrs. Joseph agreeable.  Obtained signed informed consent.  Inserted one 0.4 mm VisiPlug lacrimal plug into the lower punctum bilaterally:       0.4mm Dissolvable VisiPlug          RLL  REF  1638                    Lot  [id number] - [id number] (!)                    2022-11-05          LLL   REF   1638                Lot [id number] - [id number] (!)                2022 -11- 05          Continue use of a medium viscosity artificial tear (Systane or Optive) in both eyes as needed throughout the day.  Return for replacement of punctal plugs in six months.

## 2020-01-22 ENCOUNTER — OFFICE VISIT (OUTPATIENT)
Dept: ENDOCRINOLOGY | Facility: CLINIC | Age: 70
End: 2020-01-22
Payer: MEDICARE

## 2020-01-22 ENCOUNTER — LAB VISIT (OUTPATIENT)
Dept: LAB | Facility: HOSPITAL | Age: 70
End: 2020-01-22
Attending: PHYSICIAN ASSISTANT
Payer: MEDICARE

## 2020-01-22 VITALS
DIASTOLIC BLOOD PRESSURE: 86 MMHG | SYSTOLIC BLOOD PRESSURE: 130 MMHG | HEART RATE: 63 BPM | TEMPERATURE: 98 F | WEIGHT: 133.81 LBS | BODY MASS INDEX: 23.71 KG/M2 | HEIGHT: 63 IN

## 2020-01-22 DIAGNOSIS — E21.0 PRIMARY HYPERPARATHYROIDISM: Primary | ICD-10-CM

## 2020-01-22 DIAGNOSIS — E55.9 HYPOVITAMINOSIS D: ICD-10-CM

## 2020-01-22 DIAGNOSIS — E04.1 THYROID NODULE: ICD-10-CM

## 2020-01-22 DIAGNOSIS — Z78.0 POSTMENOPAUSAL: ICD-10-CM

## 2020-01-22 DIAGNOSIS — E21.0 PRIMARY HYPERPARATHYROIDISM: ICD-10-CM

## 2020-01-22 DIAGNOSIS — E78.1 HYPERTRIGLYCERIDEMIA: ICD-10-CM

## 2020-01-22 DIAGNOSIS — M85.80 OSTEOPENIA, UNSPECIFIED LOCATION: ICD-10-CM

## 2020-01-22 LAB
25(OH)D3+25(OH)D2 SERPL-MCNC: 36 NG/ML (ref 30–96)
ALBUMIN SERPL BCP-MCNC: 4.4 G/DL (ref 3.5–5.2)
ALP SERPL-CCNC: 84 U/L (ref 55–135)
ALT SERPL W/O P-5'-P-CCNC: 18 U/L (ref 10–44)
ANION GAP SERPL CALC-SCNC: 8 MMOL/L (ref 8–16)
AST SERPL-CCNC: 21 U/L (ref 10–40)
BILIRUB SERPL-MCNC: 0.7 MG/DL (ref 0.1–1)
BUN SERPL-MCNC: 12 MG/DL (ref 8–23)
CA-I BLDV-SCNC: 1.34 MMOL/L (ref 1.06–1.42)
CALCIUM SERPL-MCNC: 10.1 MG/DL (ref 8.7–10.5)
CHLORIDE SERPL-SCNC: 103 MMOL/L (ref 95–110)
CO2 SERPL-SCNC: 30 MMOL/L (ref 23–29)
CREAT SERPL-MCNC: 0.7 MG/DL (ref 0.5–1.4)
EST. GFR  (AFRICAN AMERICAN): >60 ML/MIN/1.73 M^2
EST. GFR  (NON AFRICAN AMERICAN): >60 ML/MIN/1.73 M^2
GLUCOSE SERPL-MCNC: 83 MG/DL (ref 70–110)
MAGNESIUM SERPL-MCNC: 2.2 MG/DL (ref 1.6–2.6)
POTASSIUM SERPL-SCNC: 4.1 MMOL/L (ref 3.5–5.1)
PROT SERPL-MCNC: 7.6 G/DL (ref 6–8.4)
PTH-INTACT SERPL-MCNC: 63 PG/ML (ref 9–77)
SODIUM SERPL-SCNC: 141 MMOL/L (ref 136–145)
T3 SERPL-MCNC: 80 NG/DL (ref 60–180)
T4 FREE SERPL-MCNC: 0.96 NG/DL (ref 0.71–1.51)
TSH SERPL DL<=0.005 MIU/L-ACNC: 2.08 UIU/ML (ref 0.4–4)

## 2020-01-22 PROCEDURE — 99213 OFFICE O/P EST LOW 20 MIN: CPT | Mod: HCNC,S$GLB,, | Performed by: PHYSICIAN ASSISTANT

## 2020-01-22 PROCEDURE — 84480 ASSAY TRIIODOTHYRONINE (T3): CPT | Mod: HCNC

## 2020-01-22 PROCEDURE — 84439 ASSAY OF FREE THYROXINE: CPT | Mod: HCNC

## 2020-01-22 PROCEDURE — 83735 ASSAY OF MAGNESIUM: CPT | Mod: HCNC

## 2020-01-22 PROCEDURE — 86800 THYROGLOBULIN ANTIBODY: CPT | Mod: HCNC

## 2020-01-22 PROCEDURE — 84443 ASSAY THYROID STIM HORMONE: CPT | Mod: HCNC

## 2020-01-22 PROCEDURE — 99499 RISK ADDL DX/OHS AUDIT: ICD-10-PCS | Mod: HCNC,S$GLB,, | Performed by: PHYSICIAN ASSISTANT

## 2020-01-22 PROCEDURE — 82330 ASSAY OF CALCIUM: CPT | Mod: HCNC

## 2020-01-22 PROCEDURE — 82306 VITAMIN D 25 HYDROXY: CPT | Mod: HCNC

## 2020-01-22 PROCEDURE — 1101F PR PT FALLS ASSESS DOC 0-1 FALLS W/OUT INJ PAST YR: ICD-10-PCS | Mod: HCNC,CPTII,S$GLB, | Performed by: PHYSICIAN ASSISTANT

## 2020-01-22 PROCEDURE — 99213 PR OFFICE/OUTPT VISIT, EST, LEVL III, 20-29 MIN: ICD-10-PCS | Mod: HCNC,S$GLB,, | Performed by: PHYSICIAN ASSISTANT

## 2020-01-22 PROCEDURE — 83970 ASSAY OF PARATHORMONE: CPT | Mod: HCNC

## 2020-01-22 PROCEDURE — 1126F AMNT PAIN NOTED NONE PRSNT: CPT | Mod: HCNC,S$GLB,, | Performed by: PHYSICIAN ASSISTANT

## 2020-01-22 PROCEDURE — 1101F PT FALLS ASSESS-DOCD LE1/YR: CPT | Mod: HCNC,CPTII,S$GLB, | Performed by: PHYSICIAN ASSISTANT

## 2020-01-22 PROCEDURE — 82308 ASSAY OF CALCITONIN: CPT | Mod: HCNC

## 2020-01-22 PROCEDURE — 99499 UNLISTED E&M SERVICE: CPT | Mod: HCNC,S$GLB,, | Performed by: PHYSICIAN ASSISTANT

## 2020-01-22 PROCEDURE — 1126F PR PAIN SEVERITY QUANTIFIED, NO PAIN PRESENT: ICD-10-PCS | Mod: HCNC,S$GLB,, | Performed by: PHYSICIAN ASSISTANT

## 2020-01-22 PROCEDURE — 1159F PR MEDICATION LIST DOCUMENTED IN MEDICAL RECORD: ICD-10-PCS | Mod: HCNC,S$GLB,, | Performed by: PHYSICIAN ASSISTANT

## 2020-01-22 PROCEDURE — 1159F MED LIST DOCD IN RCRD: CPT | Mod: HCNC,S$GLB,, | Performed by: PHYSICIAN ASSISTANT

## 2020-01-22 PROCEDURE — 36415 COLL VENOUS BLD VENIPUNCTURE: CPT | Mod: HCNC,PO

## 2020-01-22 PROCEDURE — 99999 PR PBB SHADOW E&M-EST. PATIENT-LVL IV: CPT | Mod: PBBFAC,HCNC,, | Performed by: PHYSICIAN ASSISTANT

## 2020-01-22 PROCEDURE — 80053 COMPREHEN METABOLIC PANEL: CPT | Mod: HCNC

## 2020-01-22 PROCEDURE — 99999 PR PBB SHADOW E&M-EST. PATIENT-LVL IV: ICD-10-PCS | Mod: PBBFAC,HCNC,, | Performed by: PHYSICIAN ASSISTANT

## 2020-01-22 NOTE — PROGRESS NOTES
"CC: Hyperparathyroidism/Nodular thyroid disease and osteopenia    HPI: Eva Joseph is a 70 y.o. female here for nodular thyroid disease along with pending conditions listed in the Visit Diagnosis. No fhx of thyroid disease. Her grandson has Type 1 DM. Patient had bilateral breast cancer for which she was treated with bilateral mastectomy. She was -ve for BRCA1 and 2. Patient is a retired RN.     Stated she started imvexxy and felt swollen under her right arm and stopped.     Thyroid u/s 7/19 shows 1.7 cm nodule in left lobe that had a benign FNA. No SOB or voice changes.     Kidney stones 1970, 1977 w/ pregnancies. She also had one stone in 2005. No n/v, abdominal pain or muscle weakness.     DEXA scan: 1/19 osteopenia. No fractures, steriod injections. She has had a few falls without injections. Phx of osteoporosis and took reclast for three years. Taking calcium carbonate. Taking 2000 IU of vitamin d. She is doing pilates and line dancing 2x per week. She goes [employer] to aerobics class.  No recent kidney stones.     No heat/cold intolerance, palpations, d/c.    PMHx, PSHx: reviewed in epic.    Social Hx: no ETOH/tobacco use.     ROS:   Constitutional: no fatigue, wt loss  Eyes: No recent visual changes  Cardiovascular: + feet sweling, Denies current anginal symptoms  Respiratory: Denies current respiratory difficulty  Gastrointestinal: Denies recent bowel disturbances  GenitoUrinary - No dysuria  Skin: No new skin rash  Neurologic: + numbness and tingling in feet  Endocrine: no polyphagia, polydipsia or polyuria  Psych: no anxiety or depression  Remainder ROS negative     /86 (BP Location: Right arm, Patient Position: Sitting, BP Method: Medium (Manual))   Pulse 63   Temp 98.4 °F (36.9 °C) (Oral)   Ht 5' 3" (1.6 m)   Wt 60.7 kg (133 lb 12.8 oz)   LMP  (LMP Unknown)   BMI 23.70 kg/m²      Personally reviewed labs below:  Lab Results   Component Value Date    TSH 1.471 07/16/2019    E8MOJTT 82 " 07/16/2019    FREET4 0.96 07/16/2019     Lab Results   Component Value Date    PTH 51.0 07/16/2019    CALCIUM 10.6 (H) 07/16/2019    CAION 1.36 07/16/2019    PHOS 4.7 (H) 07/16/2019         Chemistry        Component Value Date/Time     07/16/2019 1453    K 3.8 07/16/2019 1453     07/16/2019 1453    CO2 30 (H) 07/16/2019 1453    BUN 16 07/16/2019 1453    CREATININE 0.8 07/16/2019 1453    GLU 89 07/16/2019 1453        Component Value Date/Time    CALCIUM 10.6 (H) 07/16/2019 1453    ALKPHOS 80 07/16/2019 1453    AST 23 07/16/2019 1453    ALT 27 07/16/2019 1453    BILITOT 0.5 07/16/2019 1453    ESTGFRAFRICA >60.0 07/16/2019 1453    EGFRNONAA >60.0 07/16/2019 1453         No results found for: HGBA1C     PE:  GENERAL: elderly female (looks younger than stated age), well developed, well nourished  NECK: Supple neck, normal thyroid. No bruit  LYMPHATIC: No cervical or supraclavicular lymphadenopathy  CARDIOVASCULAR: Normal heart sounds, no pedal edema  RESPIRATORY: Normal effort, clear to auscultation bl  ABDOMEN: soft, non-tender, non-distended.  FEET: appropriate footwear.   Psych: normal mood and affect    Assessment/Plan:   1. Primary hyperparathyroidism  PTH, intact    Calcium, ionized    Renal function panel   2. Osteopenia, unspecified location     3. Thyroid nodule  T4, free    TSH    US Soft Tissue Head Neck Thyroid   4. Postmenopausal     5. Hypovitaminosis D  Vitamin D   6. Hypertriglyceridemia  Lipid panel     Primary hyperparathyroidism-PTH today-monitor.   Osteopenia-continue ca and vd. Repeat DEXA 1/21.  Thyroid nodule-TFTs today. Repeat thyroid u/s 7/20.  Postmenopausal-see above  Hypovitaminosis I-snpaaz-qngbfhnt otc vd  Zedonvsmrlnzssqxojhy-zjwqvf-gnbrnwur fish oil and ASA.    F/u in 6 mths

## 2020-01-23 ENCOUNTER — TELEPHONE (OUTPATIENT)
Dept: ENDOCRINOLOGY | Facility: CLINIC | Age: 70
End: 2020-01-23

## 2020-01-23 NOTE — TELEPHONE ENCOUNTER
----- Message from Anupama Sparks sent at 1/23/2020 12:22 PM CST -----  I wanted to reach out to see if pt [id number] needs to have her US and follow up in 6 months or is it a year? she is scheduled a year from now but the notes state that Dr Ghotra wants to see her in 6 months. If you will notify me so I can get pt US scheduled for the correct time   Thanking you in advance   Anupama Sparks    [phone]

## 2020-01-24 LAB
CALCIT SERPL-MCNC: <5 PG/ML
THRYOGLOBULIN INTERPRETATION: ABNORMAL
THYROGLOB AB SERPL-ACNC: <1.8 IU/ML
THYROGLOB SERPL-MCNC: 40 NG/ML

## 2020-05-05 ENCOUNTER — PATIENT MESSAGE (OUTPATIENT)
Dept: FAMILY MEDICINE | Facility: CLINIC | Age: 70
End: 2020-05-05

## 2020-05-05 DIAGNOSIS — L29.9 PRURITUS OF SCALP: Primary | ICD-10-CM

## 2020-05-05 DIAGNOSIS — L65.9 HAIR LOSS: ICD-10-CM

## 2020-05-05 RX ORDER — HYDROXYZINE PAMOATE 25 MG/1
25-50 CAPSULE ORAL NIGHTLY PRN
Qty: 60 CAPSULE | Refills: 11 | Status: SHIPPED | OUTPATIENT
Start: 2020-05-05 | End: 2021-05-25 | Stop reason: SDUPTHER

## 2020-05-20 ENCOUNTER — PATIENT MESSAGE (OUTPATIENT)
Dept: ADMINISTRATIVE | Facility: HOSPITAL | Age: 70
End: 2020-05-20

## 2020-07-07 DIAGNOSIS — Z85.3 HISTORY OF BREAST CANCER: Primary | ICD-10-CM

## 2020-07-13 ENCOUNTER — LAB VISIT (OUTPATIENT)
Dept: LAB | Facility: HOSPITAL | Age: 70
End: 2020-07-13
Payer: MEDICARE

## 2020-07-13 ENCOUNTER — OFFICE VISIT (OUTPATIENT)
Dept: HEMATOLOGY/ONCOLOGY | Facility: CLINIC | Age: 70
End: 2020-07-13
Payer: MEDICARE

## 2020-07-13 VITALS
RESPIRATION RATE: 18 BRPM | TEMPERATURE: 99 F | HEIGHT: 64 IN | BODY MASS INDEX: 22.96 KG/M2 | DIASTOLIC BLOOD PRESSURE: 76 MMHG | WEIGHT: 134.5 LBS | OXYGEN SATURATION: 99 % | HEART RATE: 59 BPM | SYSTOLIC BLOOD PRESSURE: 156 MMHG

## 2020-07-13 DIAGNOSIS — M85.80 OSTEOPENIA, UNSPECIFIED LOCATION: ICD-10-CM

## 2020-07-13 DIAGNOSIS — Z85.3 HISTORY OF BREAST CANCER: Primary | ICD-10-CM

## 2020-07-13 DIAGNOSIS — Z85.3 HISTORY OF BREAST CANCER: ICD-10-CM

## 2020-07-13 DIAGNOSIS — E55.9 HYPOVITAMINOSIS D: ICD-10-CM

## 2020-07-13 LAB
ALBUMIN SERPL BCP-MCNC: 4.5 G/DL (ref 3.5–5.2)
ALP SERPL-CCNC: 92 U/L (ref 55–135)
ALT SERPL W/O P-5'-P-CCNC: 23 U/L (ref 10–44)
ANION GAP SERPL CALC-SCNC: 8 MMOL/L (ref 8–16)
AST SERPL-CCNC: 22 U/L (ref 10–40)
BILIRUB SERPL-MCNC: 0.7 MG/DL (ref 0.1–1)
BUN SERPL-MCNC: 13 MG/DL (ref 8–23)
CALCIUM SERPL-MCNC: 10.7 MG/DL (ref 8.7–10.5)
CHLORIDE SERPL-SCNC: 103 MMOL/L (ref 95–110)
CO2 SERPL-SCNC: 30 MMOL/L (ref 23–29)
CREAT SERPL-MCNC: 0.7 MG/DL (ref 0.5–1.4)
ERYTHROCYTE [DISTWIDTH] IN BLOOD BY AUTOMATED COUNT: 12.8 % (ref 11.5–14.5)
EST. GFR  (AFRICAN AMERICAN): >60 ML/MIN/1.73 M^2
EST. GFR  (NON AFRICAN AMERICAN): >60 ML/MIN/1.73 M^2
GLUCOSE SERPL-MCNC: 91 MG/DL (ref 70–110)
HCT VFR BLD AUTO: 46.5 % (ref 37–48.5)
HGB BLD-MCNC: 14.8 G/DL (ref 12–16)
IMM GRANULOCYTES # BLD AUTO: 0.02 K/UL (ref 0–0.04)
MCH RBC QN AUTO: 28.4 PG (ref 27–31)
MCHC RBC AUTO-ENTMCNC: 31.8 G/DL (ref 32–36)
MCV RBC AUTO: 89 FL (ref 82–98)
NEUTROPHILS # BLD AUTO: 3.3 K/UL (ref 1.8–7.7)
PLATELET # BLD AUTO: 255 K/UL (ref 150–350)
PMV BLD AUTO: 10.3 FL (ref 9.2–12.9)
POTASSIUM SERPL-SCNC: 5.1 MMOL/L (ref 3.5–5.1)
PROT SERPL-MCNC: 7.7 G/DL (ref 6–8.4)
RBC # BLD AUTO: 5.22 M/UL (ref 4–5.4)
SODIUM SERPL-SCNC: 141 MMOL/L (ref 136–145)
WBC # BLD AUTO: 6.23 K/UL (ref 3.9–12.7)

## 2020-07-13 PROCEDURE — 85027 COMPLETE CBC AUTOMATED: CPT | Mod: HCNC

## 2020-07-13 PROCEDURE — 1101F PT FALLS ASSESS-DOCD LE1/YR: CPT | Mod: HCNC,CPTII,S$GLB, | Performed by: INTERNAL MEDICINE

## 2020-07-13 PROCEDURE — 1159F MED LIST DOCD IN RCRD: CPT | Mod: HCNC,S$GLB,, | Performed by: INTERNAL MEDICINE

## 2020-07-13 PROCEDURE — 3008F BODY MASS INDEX DOCD: CPT | Mod: HCNC,CPTII,S$GLB, | Performed by: INTERNAL MEDICINE

## 2020-07-13 PROCEDURE — 36415 COLL VENOUS BLD VENIPUNCTURE: CPT | Mod: HCNC

## 2020-07-13 PROCEDURE — 99214 PR OFFICE/OUTPT VISIT, EST, LEVL IV, 30-39 MIN: ICD-10-PCS | Mod: HCNC,S$GLB,, | Performed by: INTERNAL MEDICINE

## 2020-07-13 PROCEDURE — 1126F PR PAIN SEVERITY QUANTIFIED, NO PAIN PRESENT: ICD-10-PCS | Mod: HCNC,S$GLB,, | Performed by: INTERNAL MEDICINE

## 2020-07-13 PROCEDURE — 1101F PR PT FALLS ASSESS DOC 0-1 FALLS W/OUT INJ PAST YR: ICD-10-PCS | Mod: HCNC,CPTII,S$GLB, | Performed by: INTERNAL MEDICINE

## 2020-07-13 PROCEDURE — 3008F PR BODY MASS INDEX (BMI) DOCUMENTED: ICD-10-PCS | Mod: HCNC,CPTII,S$GLB, | Performed by: INTERNAL MEDICINE

## 2020-07-13 PROCEDURE — 80053 COMPREHEN METABOLIC PANEL: CPT | Mod: HCNC

## 2020-07-13 PROCEDURE — 99999 PR PBB SHADOW E&M-EST. PATIENT-LVL IV: ICD-10-PCS | Mod: PBBFAC,HCNC,, | Performed by: INTERNAL MEDICINE

## 2020-07-13 PROCEDURE — 1126F AMNT PAIN NOTED NONE PRSNT: CPT | Mod: HCNC,S$GLB,, | Performed by: INTERNAL MEDICINE

## 2020-07-13 PROCEDURE — 1159F PR MEDICATION LIST DOCUMENTED IN MEDICAL RECORD: ICD-10-PCS | Mod: HCNC,S$GLB,, | Performed by: INTERNAL MEDICINE

## 2020-07-13 PROCEDURE — 99214 OFFICE O/P EST MOD 30 MIN: CPT | Mod: HCNC,S$GLB,, | Performed by: INTERNAL MEDICINE

## 2020-07-13 PROCEDURE — 99999 PR PBB SHADOW E&M-EST. PATIENT-LVL IV: CPT | Mod: PBBFAC,HCNC,, | Performed by: INTERNAL MEDICINE

## 2020-07-13 NOTE — PROGRESS NOTES
Subjective:       Patient ID: Eva Joseph is a 70 y.o. female.    Chief Complaint: History of breast cancer    HPI     Returns for follow-up of her history of breast cancer.  Reports doing overall well  Limited by COVID restrictions for exercise and yoga  Notes more irritable  Mentally fuzzy  Not sleeping as well  Notes some weight gain- attributed to quarantine and having grandkids  Had been biking  Feels like her feet feel swollen at times  Feels like she is waking up warm again     Oncology History:  Diagnosed in 2002 with breast cancer (intracystic papillary)- very small, discovered at [facility] by mammogram, Dr. Cosby wanted re-read on pathology and underwent lumpectomy, required re-excision and XRT.  Negative BRCA performed for strong family history (mother breast ca, maternal gm breast ca, maternal aunt- ovarian ca)  Received no adjuvant therapy     In December 2012- she had an abnormal left mammogram, bx +DCIS,, opted for  Bilateral mastectomy 1/31/13.  Took Evista x 1-2 months- leg cramps- stopped after discussing with Dr. Cosby due to side effects    Review of Systems   Constitutional: Negative for activity change, appetite change, fatigue, fever and unexpected weight change.   HENT: Negative for trouble swallowing.    Respiratory: Negative for cough and shortness of breath.    Gastrointestinal: Negative for abdominal distention, abdominal pain, constipation, diarrhea and nausea.   Genitourinary: Negative for decreased urine volume, difficulty urinating, dysuria and urgency.   Integumentary:  Negative for mole/lesion, breast mass and breast tenderness.   Neurological: Negative for light-headedness and headaches.   Hematological: Negative for adenopathy. Does not bruise/bleed easily.   Psychiatric/Behavioral: Negative for dysphoric mood. The patient is not nervous/anxious.    Breast: Negative for mass and tenderness        Objective:      Physical Exam  Vitals signs and nursing note reviewed.    Constitutional:       General: She is not in acute distress.     Appearance: Normal appearance. She is well-developed and normal weight. She is not diaphoretic.      Comments: Presents alone   HENT:      Head: Normocephalic and atraumatic.      Mouth/Throat:      Pharynx: No oropharyngeal exudate or posterior oropharyngeal erythema.   Eyes:      General: No scleral icterus.        Right eye: No discharge.         Left eye: No discharge.      Extraocular Movements: Extraocular movements intact.      Conjunctiva/sclera: Conjunctivae normal.      Pupils: Pupils are equal, round, and reactive to light.      Right eye: Pupil is round and reactive.      Left eye: Pupil is round and reactive.   Neck:      Musculoskeletal: Normal range of motion and neck supple.      Thyroid: No thyromegaly.      Vascular: No JVD.      Trachea: No tracheal deviation.   Cardiovascular:      Rate and Rhythm: Normal rate and regular rhythm.      Heart sounds: Normal heart sounds. No murmur. No friction rub. No gallop.    Pulmonary:      Effort: Pulmonary effort is normal. No respiratory distress.      Breath sounds: Normal breath sounds. No wheezing or rales.      Comments: No breast masses or LAD  Abdominal:      General: Bowel sounds are normal. There is no distension.      Palpations: Abdomen is soft. There is no mass.      Tenderness: There is no abdominal tenderness. There is no guarding or rebound.   Musculoskeletal: Normal range of motion.         General: No swelling or tenderness.      Right lower leg: No edema.      Left lower leg: No edema.   Lymphadenopathy:      Head:      Right side of head: No submandibular adenopathy.      Left side of head: No submandibular adenopathy.      Cervical: No cervical adenopathy.      Right cervical: No superficial, deep or posterior cervical adenopathy.     Left cervical: No superficial, deep or posterior cervical adenopathy.      Upper Body:      Right upper body: No supraclavicular adenopathy.       Left upper body: No supraclavicular adenopathy.      Lower Body: No right inguinal adenopathy. No left inguinal adenopathy.   Skin:     General: Skin is warm and dry.      Coloration: Skin is not pale.      Findings: No bruising, erythema, lesion, petechiae or rash.   Neurological:      Mental Status: She is alert and oriented to person, place, and time.      Cranial Nerves: No cranial nerve deficit.      Sensory: No sensory deficit.      Motor: No abnormal muscle tone.      Coordination: Coordination normal.   Psychiatric:         Mood and Affect: Mood is not anxious or depressed.         Behavior: Behavior normal.         Thought Content: Thought content normal.         Judgment: Judgment normal.      Labs- reviewed   Assessment:       1. History of breast cancer    2. Osteopenia, unspecified location    3. Hypovitaminosis D        Plan:     1. Post bilateral mastectomies  Doing well  Clinically USHA  2-3. On appropriate replacement  Calcium noted to be slightly high  Change to just Vit D and repeat in 8 weeks locally

## 2020-07-27 ENCOUNTER — PATIENT OUTREACH (OUTPATIENT)
Dept: ADMINISTRATIVE | Facility: OTHER | Age: 70
End: 2020-07-27

## 2020-07-27 NOTE — PROGRESS NOTES
Chart was reviewed for overdue Proactive [facility] Encounters ([facility])  topics  Updates were requested from care everywhere  Health Maintenance has been updated

## 2020-07-31 ENCOUNTER — OFFICE VISIT (OUTPATIENT)
Dept: DERMATOLOGY | Facility: CLINIC | Age: 70
End: 2020-07-31
Payer: MEDICARE

## 2020-07-31 DIAGNOSIS — L72.0 MILIA: ICD-10-CM

## 2020-07-31 DIAGNOSIS — L81.4 SOLAR LENTIGO: ICD-10-CM

## 2020-07-31 DIAGNOSIS — D22.9 MULTIPLE BENIGN NEVI: ICD-10-CM

## 2020-07-31 DIAGNOSIS — L73.8 SEBACEOUS HYPERPLASIA OF FACE: ICD-10-CM

## 2020-07-31 DIAGNOSIS — L82.1 SEBORRHEIC KERATOSES: Primary | ICD-10-CM

## 2020-07-31 DIAGNOSIS — I83.92 SPIDER VEIN OF LEFT LOWER EXTREMITY: ICD-10-CM

## 2020-07-31 PROCEDURE — 1159F MED LIST DOCD IN RCRD: CPT | Mod: HCNC,S$GLB,, | Performed by: DERMATOLOGY

## 2020-07-31 PROCEDURE — 1159F PR MEDICATION LIST DOCUMENTED IN MEDICAL RECORD: ICD-10-PCS | Mod: HCNC,S$GLB,, | Performed by: DERMATOLOGY

## 2020-07-31 PROCEDURE — 1101F PR PT FALLS ASSESS DOC 0-1 FALLS W/OUT INJ PAST YR: ICD-10-PCS | Mod: HCNC,CPTII,S$GLB, | Performed by: DERMATOLOGY

## 2020-07-31 PROCEDURE — 99203 OFFICE O/P NEW LOW 30 MIN: CPT | Mod: HCNC,S$GLB,, | Performed by: DERMATOLOGY

## 2020-07-31 PROCEDURE — 1101F PT FALLS ASSESS-DOCD LE1/YR: CPT | Mod: HCNC,CPTII,S$GLB, | Performed by: DERMATOLOGY

## 2020-07-31 PROCEDURE — 99203 PR OFFICE/OUTPT VISIT, NEW, LEVL III, 30-44 MIN: ICD-10-PCS | Mod: HCNC,S$GLB,, | Performed by: DERMATOLOGY

## 2020-07-31 PROCEDURE — 99999 PR PBB SHADOW E&M-EST. PATIENT-LVL III: CPT | Mod: PBBFAC,HCNC,, | Performed by: DERMATOLOGY

## 2020-07-31 PROCEDURE — 99999 PR PBB SHADOW E&M-EST. PATIENT-LVL III: ICD-10-PCS | Mod: PBBFAC,HCNC,, | Performed by: DERMATOLOGY

## 2020-07-31 NOTE — PROGRESS NOTES
Subjective:       Patient ID:  Eva Joseph is a 70 y.o. female who presents for   Chief Complaint   Patient presents with    Mole     New Patient     70 y.o. female who presents for a TBSE.   Mole - R side - years - seems bigger - would like checked.   Sun spot - R cheek - years - comes and goes - has had cauterized before but didn't help much.     Would to discuss eyebrow tattooing     Derm HX:   Denies any phx or fhx of skin CA    H/o breast ca x 2, double mastectomy 2013      Current Outpatient Medications:     amino acids-minerals (A/G PRO) Tab, Take 2 tablets by mouth 3 (three) times daily., Disp: , Rfl:     aspirin (ECOTRIN) 81 MG EC tablet, Take 81 mg by mouth once daily., Disp: , Rfl:     biotin 10,000 mcg TbDL, Take 1 tablet by mouth once daily., Disp: , Rfl:     CALCIUM CARBONATE/VITAMIN D3 (CALCIUM 500 + D ORAL), Take by mouth. 1 Capsule Oral Every evening, Disp: , Rfl:     cholecalciferol, vitamin D3, (VITAMIN D3) 2,000 unit Cap, Take 1 capsule by mouth once daily., Disp: , Rfl:     coenzyme Q10-vitamin E (COQ10 ) 100-100 mg-unit Cap, Take by mouth. 1 Capsule Oral Every day, Disp: , Rfl:     estradiol (IMVEXXY MAINTENANCE PACK) 10 mcg Inst, Place 1 suppository vaginally twice a week., Disp: 8 each, Rfl: 11    fish oil-dha-epa 1,200-144-216 mg Cap, Take by mouth. 1 Capsule Oral Every day, Disp: , Rfl:     glucosamine-chondroitin 500-400 mg tablet, Take 1 tablet by mouth 3 (three) times daily., Disp: , Rfl:     hydrocortisone 2.5 % cream, Apply topically 2 (two) times daily as needed. Prn itching, Disp: 28 g, Rfl: 1    hydrOXYzine pamoate (VISTARIL) 25 MG Cap, Take 1-2 capsules (25-50 mg total) by mouth nightly as needed., Disp: 60 capsule, Rfl: 11    minoxidil (ROGAINE) 5 % Foam, Apply topically once daily., Disp: , Rfl:     multivitamin (THERAGRAN) per tablet, Take by mouth. 1 Tablet Oral Every day, Disp: , Rfl:     co-enzyme Q-10 30 mg capsule, , Disp: , Rfl:     coenzyme  Q10 100 mg capsule, , Disp: , Rfl:         Review of Systems   Constitutional: Negative for fever, chills, weight loss, weight gain, fatigue, night sweats and malaise.   Respiratory: Negative for cough and shortness of breath.    Skin: Negative for itching, rash, dry skin, sun sensitivity, daily sunscreen use, activity-related sunscreen use, sensitivity to antibiotic ointment, sensitivity to bandage adhesive, tendency to form keloidal scars, recent sunburn, dry lips, abscesses and wears hat.   Hematologic/Lymphatic: Does not bruise/bleed easily.      Past Medical History:   Diagnosis Date    Breast cancer 2000    also in 2013    Depression     Hypertriglyceridemia 7/21/2019    Nephrolithiasis 2/5/2015    Osteopenia      Objective:    Physical Exam   Constitutional: She appears well-developed and well-nourished. No distress.   HENT:   Mouth/Throat: Lips normal.    Eyes: Lids are normal.    Neurological: She is alert and oriented to person, place, and time. She is not disoriented.   Psychiatric: She has a normal mood and affect. She is not agitated.   Skin:   Areas Examined (abnormalities noted in diagram):   Scalp / Hair Palpated and Inspected  Head / Face Inspection Performed  Neck Inspection Performed  Chest / Axilla Inspection Performed  Abdomen Inspection Performed  Genitals / Buttocks / Groin Inspection Performed  Back Inspection Performed  RUE Inspected  LUE Inspection Performed  RLE Inspected  LLE Inspection Performed  Nails and Digits Inspection Performed                   Diagram Legend     Erythematous scaling macule/papule c/w actinic keratosis       Vascular papule c/w angioma      Pigmented verrucoid papule/plaque c/w seborrheic keratosis      Yellow umbilicated papule c/w sebaceous hyperplasia      Irregularly shaped tan macule c/w lentigo     1-2 mm smooth white papules consistent with Milia      Movable subcutaneous cyst with punctum c/w epidermal inclusion cyst      Subcutaneous movable cyst c/w  pilar cyst      Firm pink to brown papule c/w dermatofibroma      Pedunculated fleshy papule(s) c/w skin tag(s)      Evenly pigmented macule c/w junctional nevus     Mildly variegated pigmented, slightly irregular-bordered macule c/w mildly atypical nevus      Flesh colored to evenly pigmented papule c/w intradermal nevus       Pink pearly papule/plaque c/w basal cell carcinoma      Erythematous hyperkeratotic cursted plaque c/w SCC      Surgical scar with no sign of skin cancer recurrence      Open and closed comedones      Inflammatory papules and pustules      Verrucoid papule consistent consistent with wart     Erythematous eczematous patches and plaques     Dystrophic onycholytic nail with subungual debris c/w onychomycosis     Umbilicated papule    Erythematous-base heme-crusted tan verrucoid plaque consistent with inflamed seborrheic keratosis     Erythematous Silvery Scaling Plaque c/w Psoriasis     See annotation      Assessment / Plan:        Seborrheic keratoses  These are benign inherited growths without a malignant potential. Reassurance given to patient. No treatment is necessary.     Multiple benign nevi  Discussed ABCDE's of nevi.  Monitor for new mole or moles that are becoming bigger, darker, irritated, or developing irregular borders. Brochure provided.    Solar lentigo  This is a benign hyperpigmented sun induced lesion. Daily sun protection will reduce the number of new lesions. Treatment of these benign lesions are considered cosmetic.    Milia  Reassurance given to patient. No treatment is necessary.   Treatment of benign, asymptomatic lesions may be considered cosmetic.    Spider vein of left lower extremity  Consider vein clinic evaluation    Sebaceous hyperplasia of face  This is a common condition representing benign enlargement of the sebaceous lobule. It typically occurs in adulthood. Reassurance given to patient.     Patient instructed in importance in daily sun protection of at least  spf 30. Mineral sunscreen ingredients preferred (Zinc +/- Titanium).   Recommend Elta MD for daily use on face and neck.  Patient encouraged to wear hat for all outdoor exposure.   Also discussed sun avoidance and use of protective clothing.             No follow-ups on file.

## 2020-08-03 ENCOUNTER — LAB VISIT (OUTPATIENT)
Dept: LAB | Facility: HOSPITAL | Age: 70
End: 2020-08-03
Attending: PHYSICIAN ASSISTANT
Payer: MEDICARE

## 2020-08-03 ENCOUNTER — OFFICE VISIT (OUTPATIENT)
Dept: ENDOCRINOLOGY | Facility: CLINIC | Age: 70
End: 2020-08-03
Payer: MEDICARE

## 2020-08-03 VITALS
BODY MASS INDEX: 23.13 KG/M2 | WEIGHT: 135.5 LBS | DIASTOLIC BLOOD PRESSURE: 80 MMHG | TEMPERATURE: 99 F | SYSTOLIC BLOOD PRESSURE: 130 MMHG | HEIGHT: 64 IN | HEART RATE: 61 BPM | OXYGEN SATURATION: 98 %

## 2020-08-03 DIAGNOSIS — E78.1 HYPERTRIGLYCERIDEMIA: ICD-10-CM

## 2020-08-03 DIAGNOSIS — E21.0 PRIMARY HYPERPARATHYROIDISM: ICD-10-CM

## 2020-08-03 DIAGNOSIS — E21.0 PRIMARY HYPERPARATHYROIDISM: Primary | ICD-10-CM

## 2020-08-03 DIAGNOSIS — M85.80 OSTEOPENIA, UNSPECIFIED LOCATION: ICD-10-CM

## 2020-08-03 DIAGNOSIS — E04.1 THYROID NODULE: ICD-10-CM

## 2020-08-03 DIAGNOSIS — Z78.0 POSTMENOPAUSAL: ICD-10-CM

## 2020-08-03 DIAGNOSIS — Z85.3 HISTORY OF BREAST CANCER: ICD-10-CM

## 2020-08-03 DIAGNOSIS — E55.9 HYPOVITAMINOSIS D: ICD-10-CM

## 2020-08-03 LAB
ALBUMIN SERPL BCP-MCNC: 4.1 G/DL (ref 3.5–5.2)
ANION GAP SERPL CALC-SCNC: 6 MMOL/L (ref 8–16)
BUN SERPL-MCNC: 14 MG/DL (ref 8–23)
CA-I BLDV-SCNC: 1.29 MMOL/L (ref 1.06–1.42)
CALCIUM SERPL-MCNC: 9 MG/DL (ref 8.7–10.5)
CHLORIDE SERPL-SCNC: 106 MMOL/L (ref 95–110)
CHOLEST SERPL-MCNC: 202 MG/DL (ref 120–199)
CHOLEST/HDLC SERPL: 3.3 {RATIO} (ref 2–5)
CO2 SERPL-SCNC: 29 MMOL/L (ref 23–29)
CREAT SERPL-MCNC: 0.7 MG/DL (ref 0.5–1.4)
EST. GFR  (AFRICAN AMERICAN): >60 ML/MIN/1.73 M^2
EST. GFR  (NON AFRICAN AMERICAN): >60 ML/MIN/1.73 M^2
GLUCOSE SERPL-MCNC: 86 MG/DL (ref 70–110)
HDLC SERPL-MCNC: 61 MG/DL (ref 40–75)
HDLC SERPL: 30.2 % (ref 20–50)
LDLC SERPL CALC-MCNC: 120.8 MG/DL (ref 63–159)
NONHDLC SERPL-MCNC: 141 MG/DL
PHOSPHATE SERPL-MCNC: 2.9 MG/DL (ref 2.7–4.5)
POTASSIUM SERPL-SCNC: 4.4 MMOL/L (ref 3.5–5.1)
SODIUM SERPL-SCNC: 141 MMOL/L (ref 136–145)
T4 FREE SERPL-MCNC: 0.95 NG/DL (ref 0.71–1.51)
TRIGL SERPL-MCNC: 101 MG/DL (ref 30–150)
TSH SERPL DL<=0.005 MIU/L-ACNC: 2.62 UIU/ML (ref 0.4–4)
TSH SERPL DL<=0.005 MIU/L-ACNC: 2.62 UIU/ML (ref 0.4–4)

## 2020-08-03 PROCEDURE — 80069 RENAL FUNCTION PANEL: CPT | Mod: HCNC

## 2020-08-03 PROCEDURE — 1100F PR PT FALLS ASSESS DOC 2+ FALLS/FALL W/INJURY/YR: ICD-10-PCS | Mod: HCNC,CPTII,S$GLB, | Performed by: PHYSICIAN ASSISTANT

## 2020-08-03 PROCEDURE — 99999 PR PBB SHADOW E&M-EST. PATIENT-LVL IV: ICD-10-PCS | Mod: PBBFAC,HCNC,, | Performed by: PHYSICIAN ASSISTANT

## 2020-08-03 PROCEDURE — 3288F FALL RISK ASSESSMENT DOCD: CPT | Mod: HCNC,CPTII,S$GLB, | Performed by: PHYSICIAN ASSISTANT

## 2020-08-03 PROCEDURE — 84443 ASSAY THYROID STIM HORMONE: CPT | Mod: HCNC

## 2020-08-03 PROCEDURE — 80061 LIPID PANEL: CPT | Mod: HCNC

## 2020-08-03 PROCEDURE — 99499 UNLISTED E&M SERVICE: CPT | Mod: HCNC,S$GLB,, | Performed by: PHYSICIAN ASSISTANT

## 2020-08-03 PROCEDURE — 99499 RISK ADDL DX/OHS AUDIT: ICD-10-PCS | Mod: HCNC,S$GLB,, | Performed by: PHYSICIAN ASSISTANT

## 2020-08-03 PROCEDURE — 36415 COLL VENOUS BLD VENIPUNCTURE: CPT | Mod: HCNC,PO

## 2020-08-03 PROCEDURE — 99999 PR PBB SHADOW E&M-EST. PATIENT-LVL IV: CPT | Mod: PBBFAC,HCNC,, | Performed by: PHYSICIAN ASSISTANT

## 2020-08-03 PROCEDURE — 3008F BODY MASS INDEX DOCD: CPT | Mod: HCNC,CPTII,S$GLB, | Performed by: PHYSICIAN ASSISTANT

## 2020-08-03 PROCEDURE — 1125F PR PAIN SEVERITY QUANTIFIED, PAIN PRESENT: ICD-10-PCS | Mod: HCNC,S$GLB,, | Performed by: PHYSICIAN ASSISTANT

## 2020-08-03 PROCEDURE — 1159F PR MEDICATION LIST DOCUMENTED IN MEDICAL RECORD: ICD-10-PCS | Mod: HCNC,S$GLB,, | Performed by: PHYSICIAN ASSISTANT

## 2020-08-03 PROCEDURE — 99213 PR OFFICE/OUTPT VISIT, EST, LEVL III, 20-29 MIN: ICD-10-PCS | Mod: HCNC,S$GLB,, | Performed by: PHYSICIAN ASSISTANT

## 2020-08-03 PROCEDURE — 83970 ASSAY OF PARATHORMONE: CPT | Mod: HCNC

## 2020-08-03 PROCEDURE — 1159F MED LIST DOCD IN RCRD: CPT | Mod: HCNC,S$GLB,, | Performed by: PHYSICIAN ASSISTANT

## 2020-08-03 PROCEDURE — 99213 OFFICE O/P EST LOW 20 MIN: CPT | Mod: HCNC,S$GLB,, | Performed by: PHYSICIAN ASSISTANT

## 2020-08-03 PROCEDURE — 84439 ASSAY OF FREE THYROXINE: CPT | Mod: HCNC

## 2020-08-03 PROCEDURE — 1100F PTFALLS ASSESS-DOCD GE2>/YR: CPT | Mod: HCNC,CPTII,S$GLB, | Performed by: PHYSICIAN ASSISTANT

## 2020-08-03 PROCEDURE — 82330 ASSAY OF CALCIUM: CPT | Mod: HCNC

## 2020-08-03 PROCEDURE — 3008F PR BODY MASS INDEX (BMI) DOCUMENTED: ICD-10-PCS | Mod: HCNC,CPTII,S$GLB, | Performed by: PHYSICIAN ASSISTANT

## 2020-08-03 PROCEDURE — 3288F PR FALLS RISK ASSESSMENT DOCUMENTED: ICD-10-PCS | Mod: HCNC,CPTII,S$GLB, | Performed by: PHYSICIAN ASSISTANT

## 2020-08-03 PROCEDURE — 1125F AMNT PAIN NOTED PAIN PRSNT: CPT | Mod: HCNC,S$GLB,, | Performed by: PHYSICIAN ASSISTANT

## 2020-08-03 PROCEDURE — 82306 VITAMIN D 25 HYDROXY: CPT | Mod: HCNC

## 2020-08-03 PROCEDURE — 82306 VITAMIN D 25 HYDROXY: CPT | Mod: 91,HCNC

## 2020-08-03 NOTE — PROGRESS NOTES
"CC: Hyperparathyroidism/Nodular thyroid disease and osteopenia    HPI: Eva Joseph is a 70 y.o. female here for nodular thyroid disease along with pending conditions listed in the Visit Diagnosis. No fhx of thyroid disease. Her grandson has Type 1 DM. Patient had bilateral breast cancer for which she was treated with bilateral mastectomy. She was -ve for BRCA1 and 2. 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    Thyroid u/s 7/19 shows 1.7 cm nodule in left lobe that had a benign FNA. No SOB or voice changes.     Kidney stones 1970, 1977 w/ pregnancies. She also had one stone in 2005. No n/v, abdominal pain or muscle weakness.     DEXA scan: 1/19 osteopenia. No fractures, steriod injections. She has had a few falls without injections. Phx of osteoporosis and took reclast for three years. Taking calcium carbonate. Taking 2000 IU of vitamin d. She is doing pilates and line dancing 2x per week. She also does aerobics.  No recent kidney stones.     + heat intolerance, mental fogginess, occ palpitations. No heat/cold intolerance, d/c.    PMHx, PSHx: reviewed in epic.    Social Hx: no ETOH/tobacco use.     ROS:   Constitutional: no fatigue, wt stable  Eyes: No recent visual changes  Cardiovascular: + feet sweling, Denies current anginal symptoms  Respiratory: Denies current respiratory difficulty  Gastrointestinal: Denies recent bowel disturbances  GenitoUrinary - No dysuria  Skin: No new skin rash  Neurologic: + numbness and tingling in feet  Endocrine: no polyphagia, polydipsia or polyuria  Psych: no anxiety or depression  Remainder ROS negative     /80 (BP Location: Left arm, Patient Position: Sitting, BP Method: Medium (Manual))   Pulse 61   Temp 98.5 °F (36.9 °C) (Oral)   Ht 5' 3.5" (1.613 m)   Wt 61.4 kg (135 lb 7.6 oz)   LMP  (LMP Unknown)   SpO2 98%   BMI 23.62 kg/m²      Personally reviewed labs below:  Lab Results   Component Value Date    TSH 2.077 " 01/22/2020    U3DWPWF 80 01/22/2020    FREET4 0.96 01/22/2020     Lab Results   Component Value Date    PTH 63.0 01/22/2020    CALCIUM 10.7 (H) 07/13/2020    CAION 1.34 01/22/2020    PHOS 4.7 (H) 07/16/2019         Chemistry        Component Value Date/Time     07/13/2020 1034    K 5.1 07/13/2020 1034     07/13/2020 1034    CO2 30 (H) 07/13/2020 1034    BUN 13 07/13/2020 1034    CREATININE 0.7 07/13/2020 1034    GLU 91 07/13/2020 1034        Component Value Date/Time    CALCIUM 10.7 (H) 07/13/2020 1034    ALKPHOS 92 07/13/2020 1034    AST 22 07/13/2020 1034    ALT 23 07/13/2020 1034    BILITOT 0.7 07/13/2020 1034    ESTGFRAFRICA >60.0 07/13/2020 1034    EGFRNONAA >60.0 07/13/2020 1034         No results found for: HGBA1C     PE:  GENERAL: elderly female (looks younger than stated age), well developed, well nourished  NECK: Supple neck, normal thyroid. No bruit  LYMPHATIC: No cervical or supraclavicular lymphadenopathy  CARDIOVASCULAR: Normal heart sounds, no pedal edema  RESPIRATORY: Normal effort, clear to auscultation bl  ABDOMEN: soft, non-tender, non-distended.  FEET: appropriate footwear.   NEURO: steady gait, CN ll-Xll grossly intact  Psych: normal mood and affect    Assessment/Plan:   1. Primary hyperparathyroidism     2. Osteopenia, unspecified location     3. Thyroid nodule     4. Postmenopausal     5. Hypovitaminosis D     6. Hypertriglyceridemia       Primary hyperparathyroidism-PTH today-monitor.   Osteopenia-continue ca and vd. Repeat DEXA 1/21.  Thyroid nodule-TFTs today. Repeat thyroid u/s 7/20.  Postmenopausal-see above  Hypovitaminosis W-pdqgky-cvcsqfgf otc vd  Axkvrmmvpjhvsrrodnpi-wxbrdo-ibgvjjzf fish oil and ASA.    F/u in 6 mths

## 2020-08-04 LAB
25(OH)D3+25(OH)D2 SERPL-MCNC: 44 NG/ML (ref 30–96)
25(OH)D3+25(OH)D2 SERPL-MCNC: 48 NG/ML (ref 30–96)
PTH-INTACT SERPL-MCNC: 87 PG/ML (ref 9–77)

## 2020-08-11 ENCOUNTER — PES CALL (OUTPATIENT)
Dept: ADMINISTRATIVE | Facility: CLINIC | Age: 70
End: 2020-08-11

## 2020-08-17 ENCOUNTER — HOSPITAL ENCOUNTER (OUTPATIENT)
Dept: RADIOLOGY | Facility: HOSPITAL | Age: 70
Discharge: HOME | End: 2020-08-17
Attending: PHYSICIAN ASSISTANT
Payer: MEDICARE

## 2020-08-17 DIAGNOSIS — E04.1 THYROID NODULE: ICD-10-CM

## 2020-08-17 PROCEDURE — 76536 US SOFT TISSUE HEAD NECK THYROID: ICD-10-PCS | Mod: 26,HCNC,, | Performed by: RADIOLOGY

## 2020-08-17 PROCEDURE — 76536 US EXAM OF HEAD AND NECK: CPT | Mod: 26,HCNC,, | Performed by: RADIOLOGY

## 2020-08-17 PROCEDURE — 76536 US EXAM OF HEAD AND NECK: CPT | Mod: TC,HCNC

## 2020-09-14 ENCOUNTER — PATIENT OUTREACH (OUTPATIENT)
Dept: ADMINISTRATIVE | Facility: OTHER | Age: 70
End: 2020-09-14

## 2020-11-10 ENCOUNTER — LAB VISIT (OUTPATIENT)
Dept: LAB | Facility: HOSPITAL | Age: 70
End: 2020-11-10
Attending: PHYSICIAN ASSISTANT
Payer: MEDICARE

## 2020-11-10 ENCOUNTER — OFFICE VISIT (OUTPATIENT)
Dept: ENDOCRINOLOGY | Facility: CLINIC | Age: 70
End: 2020-11-10
Payer: MEDICARE

## 2020-11-10 ENCOUNTER — PATIENT OUTREACH (OUTPATIENT)
Dept: ADMINISTRATIVE | Facility: OTHER | Age: 70
End: 2020-11-10

## 2020-11-10 VITALS
DIASTOLIC BLOOD PRESSURE: 86 MMHG | OXYGEN SATURATION: 96 % | TEMPERATURE: 97 F | BODY MASS INDEX: 23.39 KG/M2 | HEIGHT: 64 IN | HEART RATE: 72 BPM | SYSTOLIC BLOOD PRESSURE: 128 MMHG | WEIGHT: 137 LBS

## 2020-11-10 DIAGNOSIS — L29.9 ITCHING: ICD-10-CM

## 2020-11-10 DIAGNOSIS — Z78.0 POSTMENOPAUSAL: ICD-10-CM

## 2020-11-10 DIAGNOSIS — E55.9 HYPOVITAMINOSIS D: ICD-10-CM

## 2020-11-10 DIAGNOSIS — E78.1 HYPERTRIGLYCERIDEMIA: ICD-10-CM

## 2020-11-10 DIAGNOSIS — E04.1 THYROID NODULE: ICD-10-CM

## 2020-11-10 DIAGNOSIS — E21.0 PRIMARY HYPERPARATHYROIDISM: Primary | ICD-10-CM

## 2020-11-10 DIAGNOSIS — M85.80 OSTEOPENIA, UNSPECIFIED LOCATION: ICD-10-CM

## 2020-11-10 LAB
T4 FREE SERPL-MCNC: 0.91 NG/DL (ref 0.71–1.51)
THYROPEROXIDASE IGG SERPL-ACNC: <6 IU/ML
TSH SERPL DL<=0.005 MIU/L-ACNC: 1.78 UIU/ML (ref 0.4–4)

## 2020-11-10 PROCEDURE — 1159F MED LIST DOCD IN RCRD: CPT | Mod: HCNC,S$GLB,, | Performed by: PHYSICIAN ASSISTANT

## 2020-11-10 PROCEDURE — 1159F PR MEDICATION LIST DOCUMENTED IN MEDICAL RECORD: ICD-10-PCS | Mod: HCNC,S$GLB,, | Performed by: PHYSICIAN ASSISTANT

## 2020-11-10 PROCEDURE — 86376 MICROSOMAL ANTIBODY EACH: CPT | Mod: HCNC

## 2020-11-10 PROCEDURE — 1101F PR PT FALLS ASSESS DOC 0-1 FALLS W/OUT INJ PAST YR: ICD-10-PCS | Mod: HCNC,CPTII,S$GLB, | Performed by: PHYSICIAN ASSISTANT

## 2020-11-10 PROCEDURE — 99214 PR OFFICE/OUTPT VISIT, EST, LEVL IV, 30-39 MIN: ICD-10-PCS | Mod: HCNC,S$GLB,, | Performed by: PHYSICIAN ASSISTANT

## 2020-11-10 PROCEDURE — 99999 PR PBB SHADOW E&M-EST. PATIENT-LVL IV: ICD-10-PCS | Mod: PBBFAC,HCNC,, | Performed by: PHYSICIAN ASSISTANT

## 2020-11-10 PROCEDURE — 1126F PR PAIN SEVERITY QUANTIFIED, NO PAIN PRESENT: ICD-10-PCS | Mod: HCNC,S$GLB,, | Performed by: PHYSICIAN ASSISTANT

## 2020-11-10 PROCEDURE — 84432 ASSAY OF THYROGLOBULIN: CPT | Mod: HCNC

## 2020-11-10 PROCEDURE — 99499 UNLISTED E&M SERVICE: CPT | Mod: S$GLB,,, | Performed by: PHYSICIAN ASSISTANT

## 2020-11-10 PROCEDURE — 84439 ASSAY OF FREE THYROXINE: CPT | Mod: HCNC

## 2020-11-10 PROCEDURE — 84443 ASSAY THYROID STIM HORMONE: CPT | Mod: HCNC

## 2020-11-10 PROCEDURE — 1126F AMNT PAIN NOTED NONE PRSNT: CPT | Mod: HCNC,S$GLB,, | Performed by: PHYSICIAN ASSISTANT

## 2020-11-10 PROCEDURE — 99214 OFFICE O/P EST MOD 30 MIN: CPT | Mod: HCNC,S$GLB,, | Performed by: PHYSICIAN ASSISTANT

## 2020-11-10 PROCEDURE — 1101F PT FALLS ASSESS-DOCD LE1/YR: CPT | Mod: HCNC,CPTII,S$GLB, | Performed by: PHYSICIAN ASSISTANT

## 2020-11-10 PROCEDURE — 3008F PR BODY MASS INDEX (BMI) DOCUMENTED: ICD-10-PCS | Mod: HCNC,CPTII,S$GLB, | Performed by: PHYSICIAN ASSISTANT

## 2020-11-10 PROCEDURE — 3008F BODY MASS INDEX DOCD: CPT | Mod: HCNC,CPTII,S$GLB, | Performed by: PHYSICIAN ASSISTANT

## 2020-11-10 PROCEDURE — 99999 PR PBB SHADOW E&M-EST. PATIENT-LVL IV: CPT | Mod: PBBFAC,HCNC,, | Performed by: PHYSICIAN ASSISTANT

## 2020-11-10 PROCEDURE — 99499 RISK ADDL DX/OHS AUDIT: ICD-10-PCS | Mod: S$GLB,,, | Performed by: PHYSICIAN ASSISTANT

## 2020-11-10 RX ORDER — HYDROXYZINE HYDROCHLORIDE 10 MG/1
10 TABLET, FILM COATED ORAL DAILY
Qty: 30 TABLET | Refills: 11 | Status: SHIPPED | OUTPATIENT
Start: 2020-11-10 | End: 2022-03-30

## 2020-11-10 NOTE — PROGRESS NOTES
"CC: Hyperparathyroidism/Nodular thyroid disease and osteopenia    HPI: Eva Joseph is a 70 y.o. female here for nodular thyroid disease along with pending conditions listed in the Visit Diagnosis. No fhx of thyroid disease. Her grandson has Type 1 DM. Patient had bilateral breast cancer for which she was treated with bilateral mastectomy. She was -ve for BRCA1 and 2. 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    Thyroid u/s 8/20 shows 1.7 cm nodule in left lobe that had a benign FNA. No SOB or voice changes. + TPO    Kidney stones 1970, 1977 w/ pregnancies. She also had one stone in 2005. No n/v, abdominal pain or muscle weakness.     DEXA scan: 1/19 osteopenia. No fractures, falls or steriod injections. Phx of osteoporosis and took reclast for three years. Not taking calcium carbonate. Taking 2000 IU of vitamin d. She is doing pilates, step class,  and line dancing 2x per week. She also does aerobics.  No recent kidney stones. No steriod injections.    + fatigue, hair loss, depressed, heat intolerance, mental fogginess, occ palpitations.     PMHx, PSHx: reviewed in epic.    Social Hx: no ETOH/tobacco use.     ROS:   Constitutional: no fatigue, wt stable  Eyes: No recent visual changes  Cardiovascular: + feet sweling, Denies current anginal symptoms  Respiratory: Denies current respiratory difficulty  Gastrointestinal: Denies recent bowel disturbances  GenitoUrinary - No dysuria  Skin: + scalp itching  Neurologic: + numbness and tingling in feet  Endocrine: no polyphagia, polydipsia or polyuria  Psych: no anxiety or depression  Remainder ROS negative     /86 (BP Location: Left arm, Patient Position: Sitting, BP Method: Medium (Manual))   Pulse 72   Temp 97.3 °F (36.3 °C) (Temporal)   Ht 5' 3.5" (1.613 m)   Wt 62.1 kg (137 lb 0.3 oz)   LMP  (LMP Unknown)   SpO2 96%   BMI 23.89 kg/m²      Personally reviewed labs below:  Lab Results   Component Value Date    TSH " 2.618 08/03/2020    TSH 2.618 08/03/2020    [id number] 80 01/22/2020    FREET4 0.95 08/03/2020     Lab Results   Component Value Date    PTH 87.0 (H) 08/03/2020    CALCIUM 9.0 08/03/2020    CAION 1.29 08/03/2020    PHOS 2.9 08/03/2020         Chemistry        Component Value Date/Time     08/03/2020 0935    K 4.4 08/03/2020 0935     08/03/2020 0935    CO2 29 08/03/2020 0935    BUN 14 08/03/2020 0935    CREATININE 0.7 08/03/2020 0935    GLU 86 08/03/2020 0935        Component Value Date/Time    CALCIUM 9.0 08/03/2020 0935    ALKPHOS 92 07/13/2020 1034    AST 22 07/13/2020 1034    ALT 23 07/13/2020 1034    BILITOT 0.7 07/13/2020 1034    ESTGFRAFRICA >60.0 08/03/2020 0935    EGFRNONAA >60.0 08/03/2020 0935         No results found for: HGBA1C     PE:  GENERAL: elderly female (looks younger than stated age), well developed, well nourished  NECK: Supple neck, normal thyroid. No bruit  LYMPHATIC: No cervical or supraclavicular lymphadenopathy  CARDIOVASCULAR: Normal heart sounds, no pedal edema  RESPIRATORY: Normal effort, clear to auscultation bl  ABDOMEN: soft, non-tender, non-distended.  FEET: appropriate footwear.   NEURO: steady gait, CN ll-Xll grossly intact  SKIN: no rash seen on scalp  Psych: normal mood and affect    Assessment/Plan:   1. Primary hyperparathyroidism     2. Osteopenia, unspecified location     3. Thyroid nodule  T4, Free    TSH    Thyroglobulin    Thyroid Peroxidase Antibody   4. Postmenopausal     5. Hypovitaminosis D     6. Hypertriglyceridemia     7. Itching  hydrOXYzine HCL (ATARAX) 10 MG Tab     Primary hyperparathyroidism-PTH today-monitor.   Osteopenia-continue ca and vd. Repeat DEXA 1/21.  Thyroid nodule-TFTs today. Repeat thyroid u/s 8/21.  Postmenopausal-see above  Hypovitaminosis K-zjclor-rqreljuc otc vd  Tlsiwjksjalaiconbfbx-vsemjb-clnswyky fish oil and ASA.  Itching-start atarax 10 mg daily    F/u in 6 mths

## 2020-11-10 NOTE — PROGRESS NOTES
Chart was reviewed for overdue Proactive [facility] Encounters (ELLI)  topics  Updates were requested from care everywhere  Health Maintenance has been updated  [place] immunization registry triggered

## 2020-11-11 ENCOUNTER — PATIENT MESSAGE (OUTPATIENT)
Dept: ENDOCRINOLOGY | Facility: CLINIC | Age: 70
End: 2020-11-11

## 2020-11-12 LAB
THRYOGLOBULIN INTERPRETATION: ABNORMAL
THYROGLOB AB SERPL-ACNC: <1.8 IU/ML
THYROGLOB SERPL-MCNC: 43 NG/ML

## 2021-01-09 ENCOUNTER — IMMUNIZATION (OUTPATIENT)
Dept: PRIMARY CARE CLINIC | Facility: CLINIC | Age: 71
End: 2021-01-09
Payer: MEDICARE

## 2021-01-09 DIAGNOSIS — Z23 NEED FOR VACCINATION: ICD-10-CM

## 2021-01-09 PROCEDURE — 0001A COVID-19, MRNA, LNP-S, PF, 30 MCG/0.3 ML DOSE VACCINE: ICD-10-PCS | Mod: S$GLB,,, | Performed by: FAMILY MEDICINE

## 2021-01-09 PROCEDURE — 91300 COVID-19, MRNA, LNP-S, PF, 30 MCG/0.3 ML DOSE VACCINE: ICD-10-PCS | Mod: S$GLB,,, | Performed by: FAMILY MEDICINE

## 2021-01-09 PROCEDURE — 0001A COVID-19, MRNA, LNP-S, PF, 30 MCG/0.3 ML DOSE VACCINE: CPT | Mod: S$GLB,,, | Performed by: FAMILY MEDICINE

## 2021-01-09 PROCEDURE — 91300 COVID-19, MRNA, LNP-S, PF, 30 MCG/0.3 ML DOSE VACCINE: CPT | Mod: S$GLB,,, | Performed by: FAMILY MEDICINE

## 2021-01-30 ENCOUNTER — IMMUNIZATION (OUTPATIENT)
Dept: PRIMARY CARE CLINIC | Facility: CLINIC | Age: 71
End: 2021-01-30
Payer: MEDICARE

## 2021-01-30 DIAGNOSIS — Z23 NEED FOR VACCINATION: Primary | ICD-10-CM

## 2021-01-30 PROCEDURE — 91300 COVID-19, MRNA, LNP-S, PF, 30 MCG/0.3 ML DOSE VACCINE: ICD-10-PCS | Mod: S$GLB,,, | Performed by: FAMILY MEDICINE

## 2021-01-30 PROCEDURE — 0002A COVID-19, MRNA, LNP-S, PF, 30 MCG/0.3 ML DOSE VACCINE: ICD-10-PCS | Mod: S$GLB,,, | Performed by: FAMILY MEDICINE

## 2021-01-30 PROCEDURE — 91300 COVID-19, MRNA, LNP-S, PF, 30 MCG/0.3 ML DOSE VACCINE: CPT | Mod: S$GLB,,, | Performed by: FAMILY MEDICINE

## 2021-01-30 PROCEDURE — 0002A COVID-19, MRNA, LNP-S, PF, 30 MCG/0.3 ML DOSE VACCINE: CPT | Mod: S$GLB,,, | Performed by: FAMILY MEDICINE

## 2021-02-09 ENCOUNTER — LAB VISIT (OUTPATIENT)
Dept: LAB | Facility: HOSPITAL | Age: 71
End: 2021-02-09
Attending: FAMILY MEDICINE
Payer: MEDICARE

## 2021-02-09 DIAGNOSIS — E04.1 THYROID NODULE: ICD-10-CM

## 2021-02-09 DIAGNOSIS — E21.0 PRIMARY HYPERPARATHYROIDISM: ICD-10-CM

## 2021-02-09 DIAGNOSIS — E55.9 HYPOVITAMINOSIS D: ICD-10-CM

## 2021-02-09 LAB
25(OH)D3+25(OH)D2 SERPL-MCNC: 45 NG/ML (ref 30–96)
ALBUMIN SERPL BCP-MCNC: 4 G/DL (ref 3.5–5.2)
ANION GAP SERPL CALC-SCNC: 8 MMOL/L (ref 8–16)
BUN SERPL-MCNC: 16 MG/DL (ref 8–23)
CA-I BLDV-SCNC: 1.33 MMOL/L (ref 1.06–1.42)
CALCIUM SERPL-MCNC: 9.6 MG/DL (ref 8.7–10.5)
CHLORIDE SERPL-SCNC: 103 MMOL/L (ref 95–110)
CO2 SERPL-SCNC: 28 MMOL/L (ref 23–29)
CREAT SERPL-MCNC: 0.7 MG/DL (ref 0.5–1.4)
EST. GFR  (AFRICAN AMERICAN): >60 ML/MIN/1.73 M^2
EST. GFR  (NON AFRICAN AMERICAN): >60 ML/MIN/1.73 M^2
GLUCOSE SERPL-MCNC: 76 MG/DL (ref 70–110)
PHOSPHATE SERPL-MCNC: 3.5 MG/DL (ref 2.7–4.5)
POTASSIUM SERPL-SCNC: 4.5 MMOL/L (ref 3.5–5.1)
PTH-INTACT SERPL-MCNC: 60 PG/ML (ref 9–77)
SODIUM SERPL-SCNC: 139 MMOL/L (ref 136–145)
T4 FREE SERPL-MCNC: 1.06 NG/DL (ref 0.71–1.51)
TSH SERPL DL<=0.005 MIU/L-ACNC: 2.38 UIU/ML (ref 0.4–4)

## 2021-02-09 PROCEDURE — 84443 ASSAY THYROID STIM HORMONE: CPT

## 2021-02-09 PROCEDURE — 80069 RENAL FUNCTION PANEL: CPT

## 2021-02-09 PROCEDURE — 83970 ASSAY OF PARATHORMONE: CPT

## 2021-02-09 PROCEDURE — 82330 ASSAY OF CALCIUM: CPT

## 2021-02-09 PROCEDURE — 82306 VITAMIN D 25 HYDROXY: CPT

## 2021-02-09 PROCEDURE — 84439 ASSAY OF FREE THYROXINE: CPT

## 2021-02-09 PROCEDURE — 36415 COLL VENOUS BLD VENIPUNCTURE: CPT | Mod: PO

## 2021-03-22 ENCOUNTER — PATIENT OUTREACH (OUTPATIENT)
Dept: ADMINISTRATIVE | Facility: OTHER | Age: 71
End: 2021-03-22

## 2021-05-25 ENCOUNTER — OFFICE VISIT (OUTPATIENT)
Dept: FAMILY MEDICINE | Facility: CLINIC | Age: 71
End: 2021-05-25
Payer: MEDICARE

## 2021-05-25 ENCOUNTER — PATIENT OUTREACH (OUTPATIENT)
Dept: ADMINISTRATIVE | Facility: OTHER | Age: 71
End: 2021-05-25

## 2021-05-25 VITALS
SYSTOLIC BLOOD PRESSURE: 126 MMHG | TEMPERATURE: 98 F | BODY MASS INDEX: 23.51 KG/M2 | HEIGHT: 64 IN | HEART RATE: 60 BPM | OXYGEN SATURATION: 96 % | WEIGHT: 137.69 LBS | DIASTOLIC BLOOD PRESSURE: 77 MMHG

## 2021-05-25 DIAGNOSIS — Z13.220 ENCOUNTER FOR LIPID SCREENING FOR CARDIOVASCULAR DISEASE: ICD-10-CM

## 2021-05-25 DIAGNOSIS — Z13.6 ENCOUNTER FOR LIPID SCREENING FOR CARDIOVASCULAR DISEASE: ICD-10-CM

## 2021-05-25 DIAGNOSIS — R79.89 ABNORMAL THYROID BLOOD TEST: ICD-10-CM

## 2021-05-25 DIAGNOSIS — E04.1 THYROID NODULE: Primary | ICD-10-CM

## 2021-05-25 DIAGNOSIS — C50.912 BILATERAL MALIGNANT NEOPLASM OF BREAST IN FEMALE, UNSPECIFIED ESTROGEN RECEPTOR STATUS, UNSPECIFIED SITE OF BREAST: ICD-10-CM

## 2021-05-25 DIAGNOSIS — Z78.0 POSTMENOPAUSAL: ICD-10-CM

## 2021-05-25 DIAGNOSIS — Z79.899 ENCOUNTER FOR LONG-TERM CURRENT USE OF MEDICATION: ICD-10-CM

## 2021-05-25 DIAGNOSIS — Z13.89 SCREENING FOR BLOOD OR PROTEIN IN URINE: ICD-10-CM

## 2021-05-25 DIAGNOSIS — C50.911 BILATERAL MALIGNANT NEOPLASM OF BREAST IN FEMALE, UNSPECIFIED ESTROGEN RECEPTOR STATUS, UNSPECIFIED SITE OF BREAST: ICD-10-CM

## 2021-05-25 PROCEDURE — 99999 PR PBB SHADOW E&M-EST. PATIENT-LVL IV: ICD-10-PCS | Mod: PBBFAC,,, | Performed by: FAMILY MEDICINE

## 2021-05-25 PROCEDURE — 99999 PR PBB SHADOW E&M-EST. PATIENT-LVL IV: CPT | Mod: PBBFAC,,, | Performed by: FAMILY MEDICINE

## 2021-05-25 PROCEDURE — 1101F PT FALLS ASSESS-DOCD LE1/YR: CPT | Mod: CPTII,S$GLB,, | Performed by: FAMILY MEDICINE

## 2021-05-25 PROCEDURE — 99499 UNLISTED E&M SERVICE: CPT | Mod: S$GLB,,, | Performed by: FAMILY MEDICINE

## 2021-05-25 PROCEDURE — 3288F FALL RISK ASSESSMENT DOCD: CPT | Mod: CPTII,S$GLB,, | Performed by: FAMILY MEDICINE

## 2021-05-25 PROCEDURE — 99215 OFFICE O/P EST HI 40 MIN: CPT | Mod: S$GLB,,, | Performed by: FAMILY MEDICINE

## 2021-05-25 PROCEDURE — 99499 RISK ADDL DX/OHS AUDIT: ICD-10-PCS | Mod: S$GLB,,, | Performed by: FAMILY MEDICINE

## 2021-05-25 PROCEDURE — 3288F PR FALLS RISK ASSESSMENT DOCUMENTED: ICD-10-PCS | Mod: CPTII,S$GLB,, | Performed by: FAMILY MEDICINE

## 2021-05-25 PROCEDURE — 3008F PR BODY MASS INDEX (BMI) DOCUMENTED: ICD-10-PCS | Mod: CPTII,S$GLB,, | Performed by: FAMILY MEDICINE

## 2021-05-25 PROCEDURE — 1126F PR PAIN SEVERITY QUANTIFIED, NO PAIN PRESENT: ICD-10-PCS | Mod: S$GLB,,, | Performed by: FAMILY MEDICINE

## 2021-05-25 PROCEDURE — 1159F MED LIST DOCD IN RCRD: CPT | Mod: S$GLB,,, | Performed by: FAMILY MEDICINE

## 2021-05-25 PROCEDURE — 1101F PR PT FALLS ASSESS DOC 0-1 FALLS W/OUT INJ PAST YR: ICD-10-PCS | Mod: CPTII,S$GLB,, | Performed by: FAMILY MEDICINE

## 2021-05-25 PROCEDURE — 1159F PR MEDICATION LIST DOCUMENTED IN MEDICAL RECORD: ICD-10-PCS | Mod: S$GLB,,, | Performed by: FAMILY MEDICINE

## 2021-05-25 PROCEDURE — 3008F BODY MASS INDEX DOCD: CPT | Mod: CPTII,S$GLB,, | Performed by: FAMILY MEDICINE

## 2021-05-25 PROCEDURE — 99215 PR OFFICE/OUTPT VISIT, EST, LEVL V, 40-54 MIN: ICD-10-PCS | Mod: S$GLB,,, | Performed by: FAMILY MEDICINE

## 2021-05-25 PROCEDURE — 1126F AMNT PAIN NOTED NONE PRSNT: CPT | Mod: S$GLB,,, | Performed by: FAMILY MEDICINE

## 2021-05-26 ENCOUNTER — HOSPITAL ENCOUNTER (OUTPATIENT)
Dept: RADIOLOGY | Facility: CLINIC | Age: 71
Discharge: HOME | End: 2021-05-26
Attending: PHYSICIAN ASSISTANT
Payer: MEDICARE

## 2021-05-26 ENCOUNTER — OFFICE VISIT (OUTPATIENT)
Dept: ENDOCRINOLOGY | Facility: CLINIC | Age: 71
End: 2021-05-26
Payer: MEDICARE

## 2021-05-26 VITALS
TEMPERATURE: 98 F | OXYGEN SATURATION: 98 % | HEART RATE: 57 BPM | SYSTOLIC BLOOD PRESSURE: 124 MMHG | HEIGHT: 64 IN | BODY MASS INDEX: 23.73 KG/M2 | DIASTOLIC BLOOD PRESSURE: 80 MMHG | WEIGHT: 139 LBS

## 2021-05-26 DIAGNOSIS — E55.9 HYPOVITAMINOSIS D: ICD-10-CM

## 2021-05-26 DIAGNOSIS — E21.0 PRIMARY HYPERPARATHYROIDISM: Primary | ICD-10-CM

## 2021-05-26 DIAGNOSIS — E78.1 HYPERTRIGLYCERIDEMIA: ICD-10-CM

## 2021-05-26 DIAGNOSIS — Z78.0 POSTMENOPAUSAL: ICD-10-CM

## 2021-05-26 DIAGNOSIS — E04.1 THYROID NODULE: ICD-10-CM

## 2021-05-26 DIAGNOSIS — M85.80 OSTEOPENIA, UNSPECIFIED LOCATION: ICD-10-CM

## 2021-05-26 DIAGNOSIS — L29.9 ITCHING: ICD-10-CM

## 2021-05-26 PROCEDURE — 1101F PT FALLS ASSESS-DOCD LE1/YR: CPT | Mod: CPTII,S$GLB,, | Performed by: PHYSICIAN ASSISTANT

## 2021-05-26 PROCEDURE — 3008F BODY MASS INDEX DOCD: CPT | Mod: CPTII,S$GLB,, | Performed by: PHYSICIAN ASSISTANT

## 2021-05-26 PROCEDURE — 99499 UNLISTED E&M SERVICE: CPT | Mod: S$GLB,,, | Performed by: PHYSICIAN ASSISTANT

## 2021-05-26 PROCEDURE — 99499 RISK ADDL DX/OHS AUDIT: ICD-10-PCS | Mod: S$GLB,,, | Performed by: PHYSICIAN ASSISTANT

## 2021-05-26 PROCEDURE — 1159F PR MEDICATION LIST DOCUMENTED IN MEDICAL RECORD: ICD-10-PCS | Mod: S$GLB,,, | Performed by: PHYSICIAN ASSISTANT

## 2021-05-26 PROCEDURE — 99999 PR PBB SHADOW E&M-EST. PATIENT-LVL IV: ICD-10-PCS | Mod: PBBFAC,,, | Performed by: PHYSICIAN ASSISTANT

## 2021-05-26 PROCEDURE — 3008F PR BODY MASS INDEX (BMI) DOCUMENTED: ICD-10-PCS | Mod: CPTII,S$GLB,, | Performed by: PHYSICIAN ASSISTANT

## 2021-05-26 PROCEDURE — 77080 DEXA BONE DENSITY SPINE HIP: ICD-10-PCS | Mod: 26,,, | Performed by: RADIOLOGY

## 2021-05-26 PROCEDURE — 77080 DXA BONE DENSITY AXIAL: CPT | Mod: TC,PO

## 2021-05-26 PROCEDURE — 1126F AMNT PAIN NOTED NONE PRSNT: CPT | Mod: S$GLB,,, | Performed by: PHYSICIAN ASSISTANT

## 2021-05-26 PROCEDURE — 99999 PR PBB SHADOW E&M-EST. PATIENT-LVL IV: CPT | Mod: PBBFAC,,, | Performed by: PHYSICIAN ASSISTANT

## 2021-05-26 PROCEDURE — 3288F PR FALLS RISK ASSESSMENT DOCUMENTED: ICD-10-PCS | Mod: CPTII,S$GLB,, | Performed by: PHYSICIAN ASSISTANT

## 2021-05-26 PROCEDURE — 99213 PR OFFICE/OUTPT VISIT, EST, LEVL III, 20-29 MIN: ICD-10-PCS | Mod: S$GLB,,, | Performed by: PHYSICIAN ASSISTANT

## 2021-05-26 PROCEDURE — 1159F MED LIST DOCD IN RCRD: CPT | Mod: S$GLB,,, | Performed by: PHYSICIAN ASSISTANT

## 2021-05-26 PROCEDURE — 1101F PR PT FALLS ASSESS DOC 0-1 FALLS W/OUT INJ PAST YR: ICD-10-PCS | Mod: CPTII,S$GLB,, | Performed by: PHYSICIAN ASSISTANT

## 2021-05-26 PROCEDURE — 1126F PR PAIN SEVERITY QUANTIFIED, NO PAIN PRESENT: ICD-10-PCS | Mod: S$GLB,,, | Performed by: PHYSICIAN ASSISTANT

## 2021-05-26 PROCEDURE — 99213 OFFICE O/P EST LOW 20 MIN: CPT | Mod: S$GLB,,, | Performed by: PHYSICIAN ASSISTANT

## 2021-05-26 PROCEDURE — 3288F FALL RISK ASSESSMENT DOCD: CPT | Mod: CPTII,S$GLB,, | Performed by: PHYSICIAN ASSISTANT

## 2021-05-26 PROCEDURE — 77080 DXA BONE DENSITY AXIAL: CPT | Mod: 26,,, | Performed by: RADIOLOGY

## 2021-07-01 ENCOUNTER — OFFICE VISIT (OUTPATIENT)
Dept: HEMATOLOGY/ONCOLOGY | Facility: CLINIC | Age: 71
End: 2021-07-01
Payer: MEDICARE

## 2021-07-01 VITALS
HEART RATE: 59 BPM | SYSTOLIC BLOOD PRESSURE: 159 MMHG | DIASTOLIC BLOOD PRESSURE: 71 MMHG | OXYGEN SATURATION: 96 % | HEIGHT: 64 IN | BODY MASS INDEX: 23.34 KG/M2 | WEIGHT: 136.69 LBS | TEMPERATURE: 99 F | RESPIRATION RATE: 16 BRPM

## 2021-07-01 DIAGNOSIS — Z85.3 HISTORY OF BREAST CANCER: Primary | ICD-10-CM

## 2021-07-01 DIAGNOSIS — Z86.000 HISTORY OF DUCTAL CARCINOMA IN SITU (DCIS) OF BREAST: ICD-10-CM

## 2021-07-01 DIAGNOSIS — M85.80 OSTEOPENIA, UNSPECIFIED LOCATION: ICD-10-CM

## 2021-07-01 PROCEDURE — 3008F BODY MASS INDEX DOCD: CPT | Mod: CPTII,S$GLB,, | Performed by: INTERNAL MEDICINE

## 2021-07-01 PROCEDURE — 99999 PR PBB SHADOW E&M-EST. PATIENT-LVL IV: ICD-10-PCS | Mod: PBBFAC,,, | Performed by: INTERNAL MEDICINE

## 2021-07-01 PROCEDURE — 99213 PR OFFICE/OUTPT VISIT, EST, LEVL III, 20-29 MIN: ICD-10-PCS | Mod: S$GLB,,, | Performed by: INTERNAL MEDICINE

## 2021-07-01 PROCEDURE — 1159F PR MEDICATION LIST DOCUMENTED IN MEDICAL RECORD: ICD-10-PCS | Mod: S$GLB,,, | Performed by: INTERNAL MEDICINE

## 2021-07-01 PROCEDURE — 1159F MED LIST DOCD IN RCRD: CPT | Mod: S$GLB,,, | Performed by: INTERNAL MEDICINE

## 2021-07-01 PROCEDURE — 1126F AMNT PAIN NOTED NONE PRSNT: CPT | Mod: S$GLB,,, | Performed by: INTERNAL MEDICINE

## 2021-07-01 PROCEDURE — 3288F PR FALLS RISK ASSESSMENT DOCUMENTED: ICD-10-PCS | Mod: CPTII,S$GLB,, | Performed by: INTERNAL MEDICINE

## 2021-07-01 PROCEDURE — 3008F PR BODY MASS INDEX (BMI) DOCUMENTED: ICD-10-PCS | Mod: CPTII,S$GLB,, | Performed by: INTERNAL MEDICINE

## 2021-07-01 PROCEDURE — 1126F PR PAIN SEVERITY QUANTIFIED, NO PAIN PRESENT: ICD-10-PCS | Mod: S$GLB,,, | Performed by: INTERNAL MEDICINE

## 2021-07-01 PROCEDURE — 99999 PR PBB SHADOW E&M-EST. PATIENT-LVL IV: CPT | Mod: PBBFAC,,, | Performed by: INTERNAL MEDICINE

## 2021-07-01 PROCEDURE — 1101F PT FALLS ASSESS-DOCD LE1/YR: CPT | Mod: CPTII,S$GLB,, | Performed by: INTERNAL MEDICINE

## 2021-07-01 PROCEDURE — 3288F FALL RISK ASSESSMENT DOCD: CPT | Mod: CPTII,S$GLB,, | Performed by: INTERNAL MEDICINE

## 2021-07-01 PROCEDURE — 1101F PR PT FALLS ASSESS DOC 0-1 FALLS W/OUT INJ PAST YR: ICD-10-PCS | Mod: CPTII,S$GLB,, | Performed by: INTERNAL MEDICINE

## 2021-07-01 PROCEDURE — 99213 OFFICE O/P EST LOW 20 MIN: CPT | Mod: S$GLB,,, | Performed by: INTERNAL MEDICINE

## 2021-08-24 DIAGNOSIS — Z78.0 POSTMENOPAUSAL: ICD-10-CM

## 2021-08-24 DIAGNOSIS — M85.80 OSTEOPENIA, UNSPECIFIED LOCATION: Primary | ICD-10-CM

## 2021-08-26 ENCOUNTER — PATIENT MESSAGE (OUTPATIENT)
Dept: ENDOCRINOLOGY | Facility: CLINIC | Age: 71
End: 2021-08-26

## 2021-08-26 ENCOUNTER — HOSPITAL ENCOUNTER (OUTPATIENT)
Dept: RADIOLOGY | Facility: CLINIC | Age: 71
Discharge: HOME | End: 2021-08-26
Payer: MEDICARE

## 2021-08-26 DIAGNOSIS — M85.80 OSTEOPENIA, UNSPECIFIED LOCATION: ICD-10-CM

## 2021-08-26 DIAGNOSIS — Z78.0 POSTMENOPAUSAL: ICD-10-CM

## 2021-08-26 PROCEDURE — 77081 DXA BONE DENSITY APPENDICULR: CPT | Mod: TC,PO

## 2021-08-26 PROCEDURE — 77081 DEXA BONE DENSITY APPENDICULAR SKELETON: ICD-10-PCS | Mod: 26,,, | Performed by: RADIOLOGY

## 2021-08-26 PROCEDURE — 77081 DXA BONE DENSITY APPENDICULR: CPT | Mod: 26,,, | Performed by: RADIOLOGY

## 2021-08-26 RX ORDER — ALENDRONATE SODIUM 70 MG/1
TABLET ORAL
Qty: 4 TABLET | Refills: 11 | Status: SHIPPED | OUTPATIENT
Start: 2021-08-26 | End: 2022-02-14 | Stop reason: SDUPTHER

## 2021-09-09 ENCOUNTER — OFFICE VISIT (OUTPATIENT)
Dept: DERMATOLOGY | Facility: CLINIC | Age: 71
End: 2021-09-09
Payer: MEDICARE

## 2021-09-09 ENCOUNTER — PATIENT OUTREACH (OUTPATIENT)
Dept: ADMINISTRATIVE | Facility: OTHER | Age: 71
End: 2021-09-09

## 2021-09-09 DIAGNOSIS — L21.9 SEBORRHEIC DERMATITIS OF SCALP: Primary | ICD-10-CM

## 2021-09-09 DIAGNOSIS — L72.0 MILIA: ICD-10-CM

## 2021-09-09 DIAGNOSIS — L65.9 HAIR THINNING: ICD-10-CM

## 2021-09-09 DIAGNOSIS — L82.1 SEBORRHEIC KERATOSES: ICD-10-CM

## 2021-09-09 PROCEDURE — 1160F PR REVIEW ALL MEDS BY PRESCRIBER/CLIN PHARMACIST DOCUMENTED: ICD-10-PCS | Mod: CPTII,S$GLB,, | Performed by: DERMATOLOGY

## 2021-09-09 PROCEDURE — 1159F PR MEDICATION LIST DOCUMENTED IN MEDICAL RECORD: ICD-10-PCS | Mod: CPTII,S$GLB,, | Performed by: DERMATOLOGY

## 2021-09-09 PROCEDURE — 1100F PTFALLS ASSESS-DOCD GE2>/YR: CPT | Mod: CPTII,S$GLB,, | Performed by: DERMATOLOGY

## 2021-09-09 PROCEDURE — 1160F RVW MEDS BY RX/DR IN RCRD: CPT | Mod: CPTII,S$GLB,, | Performed by: DERMATOLOGY

## 2021-09-09 PROCEDURE — 99213 PR OFFICE/OUTPT VISIT, EST, LEVL III, 20-29 MIN: ICD-10-PCS | Mod: S$GLB,,, | Performed by: DERMATOLOGY

## 2021-09-09 PROCEDURE — 99213 OFFICE O/P EST LOW 20 MIN: CPT | Mod: S$GLB,,, | Performed by: DERMATOLOGY

## 2021-09-09 PROCEDURE — 1100F PR PT FALLS ASSESS DOC 2+ FALLS/FALL W/INJURY/YR: ICD-10-PCS | Mod: CPTII,S$GLB,, | Performed by: DERMATOLOGY

## 2021-09-09 PROCEDURE — 99999 PR PBB SHADOW E&M-EST. PATIENT-LVL III: ICD-10-PCS | Mod: PBBFAC,,, | Performed by: DERMATOLOGY

## 2021-09-09 PROCEDURE — 99999 PR PBB SHADOW E&M-EST. PATIENT-LVL III: CPT | Mod: PBBFAC,,, | Performed by: DERMATOLOGY

## 2021-09-09 PROCEDURE — 3288F PR FALLS RISK ASSESSMENT DOCUMENTED: ICD-10-PCS | Mod: CPTII,S$GLB,, | Performed by: DERMATOLOGY

## 2021-09-09 PROCEDURE — 1159F MED LIST DOCD IN RCRD: CPT | Mod: CPTII,S$GLB,, | Performed by: DERMATOLOGY

## 2021-09-09 PROCEDURE — 3288F FALL RISK ASSESSMENT DOCD: CPT | Mod: CPTII,S$GLB,, | Performed by: DERMATOLOGY

## 2021-09-09 RX ORDER — KETOCONAZOLE 20 MG/ML
SHAMPOO, SUSPENSION TOPICAL
Qty: 120 ML | Refills: 5 | Status: SHIPPED | OUTPATIENT
Start: 2021-09-09 | End: 2021-12-07

## 2021-09-09 RX ORDER — BETAMETHASONE DIPROPIONATE 0.5 MG/G
LOTION TOPICAL
Qty: 60 ML | Refills: 2 | Status: SHIPPED | OUTPATIENT
Start: 2021-09-09 | End: 2021-12-07

## 2021-09-16 ENCOUNTER — CLINICAL SUPPORT (OUTPATIENT)
Dept: FAMILY MEDICINE | Facility: CLINIC | Age: 71
End: 2021-09-16
Payer: MEDICARE

## 2021-09-16 DIAGNOSIS — Z20.822 EXPOSURE TO COVID-19 VIRUS: Primary | ICD-10-CM

## 2021-09-16 DIAGNOSIS — Z20.822 EXPOSURE TO COVID-19 VIRUS: ICD-10-CM

## 2021-09-16 PROCEDURE — U0003 INFECTIOUS AGENT DETECTION BY NUCLEIC ACID (DNA OR RNA); SEVERE ACUTE RESPIRATORY SYNDROME CORONAVIRUS 2 (SARS-COV-2) (CORONAVIRUS DISEASE [COVID-19]), AMPLIFIED PROBE TECHNIQUE, MAKING USE OF HIGH THROUGHPUT TECHNOLOGIES AS DESCRIBED BY CMS-2020-01-R: HCPCS | Performed by: FAMILY MEDICINE

## 2021-09-16 PROCEDURE — U0005 INFEC AGEN DETEC AMPLI PROBE: HCPCS | Performed by: FAMILY MEDICINE

## 2021-09-17 LAB
SARS-COV-2 RNA RESP QL NAA+PROBE: NOT DETECTED
SARS-COV-2- CYCLE NUMBER: NORMAL

## 2021-09-28 ENCOUNTER — IMMUNIZATION (OUTPATIENT)
Dept: FAMILY MEDICINE | Facility: CLINIC | Age: 71
End: 2021-09-28
Payer: MEDICARE

## 2021-09-28 DIAGNOSIS — Z23 NEED FOR VACCINATION: Primary | ICD-10-CM

## 2021-09-28 PROCEDURE — 91300 COVID-19, MRNA, LNP-S, PF, 30 MCG/0.3 ML DOSE VACCINE: CPT | Mod: HCWC,PBBFAC | Performed by: FAMILY MEDICINE

## 2021-09-28 PROCEDURE — 0003A COVID-19, MRNA, LNP-S, PF, 30 MCG/0.3 ML DOSE VACCINE: CPT | Mod: HCWC,PBBFAC | Performed by: FAMILY MEDICINE

## 2021-11-22 ENCOUNTER — TELEPHONE (OUTPATIENT)
Dept: FAMILY MEDICINE | Facility: CLINIC | Age: 71
End: 2021-11-22
Payer: MEDICARE

## 2021-12-06 ENCOUNTER — TELEPHONE (OUTPATIENT)
Dept: FAMILY MEDICINE | Facility: CLINIC | Age: 71
End: 2021-12-06
Payer: MEDICARE

## 2021-12-07 ENCOUNTER — OFFICE VISIT (OUTPATIENT)
Dept: FAMILY MEDICINE | Facility: CLINIC | Age: 71
End: 2021-12-07
Payer: MEDICARE

## 2021-12-07 VITALS
HEART RATE: 57 BPM | OXYGEN SATURATION: 97 % | DIASTOLIC BLOOD PRESSURE: 75 MMHG | SYSTOLIC BLOOD PRESSURE: 149 MMHG | HEIGHT: 64 IN | BODY MASS INDEX: 23.5 KG/M2 | WEIGHT: 137.63 LBS | TEMPERATURE: 97 F

## 2021-12-07 DIAGNOSIS — S62.639A CLOSED AVULSION FRACTURE OF DISTAL PHALANX OF FINGER, INITIAL ENCOUNTER: ICD-10-CM

## 2021-12-07 DIAGNOSIS — S60.021A CONTUSION OF RIGHT INDEX FINGER WITHOUT DAMAGE TO NAIL, INITIAL ENCOUNTER: Primary | ICD-10-CM

## 2021-12-07 PROCEDURE — 99212 OFFICE O/P EST SF 10 MIN: CPT | Mod: HCWC,S$GLB,, | Performed by: FAMILY MEDICINE

## 2021-12-07 PROCEDURE — 99212 PR OFFICE/OUTPT VISIT, EST, LEVL II, 10-19 MIN: ICD-10-PCS | Mod: HCWC,S$GLB,, | Performed by: FAMILY MEDICINE

## 2021-12-07 PROCEDURE — 99999 PR PBB SHADOW E&M-EST. PATIENT-LVL III: CPT | Mod: PBBFAC,HCWC,, | Performed by: FAMILY MEDICINE

## 2021-12-07 PROCEDURE — 99999 PR PBB SHADOW E&M-EST. PATIENT-LVL III: ICD-10-PCS | Mod: PBBFAC,HCWC,, | Performed by: FAMILY MEDICINE

## 2021-12-08 ENCOUNTER — HOSPITAL ENCOUNTER (OUTPATIENT)
Dept: RADIOLOGY | Facility: HOSPITAL | Age: 71
Discharge: HOME | End: 2021-12-08
Attending: FAMILY MEDICINE
Payer: MEDICARE

## 2021-12-08 DIAGNOSIS — S60.021A CONTUSION OF RIGHT INDEX FINGER WITHOUT DAMAGE TO NAIL, INITIAL ENCOUNTER: ICD-10-CM

## 2021-12-08 PROCEDURE — 73140 X-RAY EXAM OF FINGER(S): CPT | Mod: TC,HCWC,FY

## 2021-12-08 PROCEDURE — 73140 X-RAY EXAM OF FINGER(S): CPT | Mod: 26,HCWC,RT, | Performed by: RADIOLOGY

## 2021-12-08 PROCEDURE — 73140 XR FINGER 2 OR MORE VIEWS: ICD-10-PCS | Mod: 26,HCWC,RT, | Performed by: RADIOLOGY

## 2021-12-09 ENCOUNTER — TELEPHONE (OUTPATIENT)
Dept: FAMILY MEDICINE | Facility: CLINIC | Age: 71
End: 2021-12-09
Payer: MEDICARE

## 2021-12-09 NOTE — TELEPHONE ENCOUNTER
----- Message from Starr Garcia MD sent at 12/8/2021  4:17 PM CST -----  Please let patient know she has an avulsion fracture of the finger. I will send a referral to ortho. She should continue to splint the finger and begin movement of the finger if possible. If physical therapy is needed after ortho evaluation I can ref  er her. Thank you.

## 2021-12-10 ENCOUNTER — TELEPHONE (OUTPATIENT)
Dept: FAMILY MEDICINE | Facility: CLINIC | Age: 71
End: 2021-12-10
Payer: MEDICARE

## 2021-12-16 ENCOUNTER — LAB VISIT (OUTPATIENT)
Dept: LAB | Facility: HOSPITAL | Age: 71
End: 2021-12-16
Attending: FAMILY MEDICINE
Payer: MEDICARE

## 2021-12-16 DIAGNOSIS — Z78.0 POSTMENOPAUSAL: ICD-10-CM

## 2021-12-16 DIAGNOSIS — Z79.899 ENCOUNTER FOR LONG-TERM CURRENT USE OF MEDICATION: ICD-10-CM

## 2021-12-16 DIAGNOSIS — E21.0 PRIMARY HYPERPARATHYROIDISM: ICD-10-CM

## 2021-12-16 DIAGNOSIS — E04.1 THYROID NODULE: ICD-10-CM

## 2021-12-16 LAB
ALBUMIN SERPL BCP-MCNC: 4.2 G/DL (ref 3.5–5.2)
ALP SERPL-CCNC: 70 U/L (ref 55–135)
ALT SERPL W/O P-5'-P-CCNC: 20 U/L (ref 10–44)
ANION GAP SERPL CALC-SCNC: 13 MMOL/L (ref 8–16)
AST SERPL-CCNC: 23 U/L (ref 10–40)
BILIRUB SERPL-MCNC: 0.6 MG/DL (ref 0.1–1)
BUN SERPL-MCNC: 13 MG/DL (ref 8–23)
CA-I BLDV-SCNC: 1.35 MMOL/L (ref 1.06–1.42)
CALCIUM SERPL-MCNC: 10.2 MG/DL (ref 8.7–10.5)
CHLORIDE SERPL-SCNC: 104 MMOL/L (ref 95–110)
CO2 SERPL-SCNC: 24 MMOL/L (ref 23–29)
CREAT SERPL-MCNC: 0.7 MG/DL (ref 0.5–1.4)
EST. GFR  (AFRICAN AMERICAN): >60 ML/MIN/1.73 M^2
EST. GFR  (NON AFRICAN AMERICAN): >60 ML/MIN/1.73 M^2
GLUCOSE SERPL-MCNC: 84 MG/DL (ref 70–110)
POTASSIUM SERPL-SCNC: 4 MMOL/L (ref 3.5–5.1)
PROT SERPL-MCNC: 7.4 G/DL (ref 6–8.4)
PTH-INTACT SERPL-MCNC: 41.7 PG/ML (ref 9–77)
SODIUM SERPL-SCNC: 141 MMOL/L (ref 136–145)
T4 FREE SERPL-MCNC: 0.96 NG/DL (ref 0.71–1.51)
TSH SERPL DL<=0.005 MIU/L-ACNC: 2.77 UIU/ML (ref 0.4–4)
VIT B12 SERPL-MCNC: 526 PG/ML (ref 210–950)

## 2021-12-16 PROCEDURE — 82330 ASSAY OF CALCIUM: CPT | Mod: HCNC | Performed by: PHYSICIAN ASSISTANT

## 2021-12-16 PROCEDURE — 80053 COMPREHEN METABOLIC PANEL: CPT | Mod: HCNC | Performed by: PHYSICIAN ASSISTANT

## 2021-12-16 PROCEDURE — 36415 COLL VENOUS BLD VENIPUNCTURE: CPT | Mod: HCNC,PO | Performed by: FAMILY MEDICINE

## 2021-12-16 PROCEDURE — 83970 ASSAY OF PARATHORMONE: CPT | Mod: HCNC | Performed by: PHYSICIAN ASSISTANT

## 2021-12-16 PROCEDURE — 84443 ASSAY THYROID STIM HORMONE: CPT | Mod: HCNC | Performed by: PHYSICIAN ASSISTANT

## 2021-12-16 PROCEDURE — 84439 ASSAY OF FREE THYROXINE: CPT | Mod: HCNC | Performed by: PHYSICIAN ASSISTANT

## 2021-12-16 PROCEDURE — 82607 VITAMIN B-12: CPT | Mod: HCNC | Performed by: FAMILY MEDICINE

## 2021-12-22 ENCOUNTER — PATIENT MESSAGE (OUTPATIENT)
Dept: FAMILY MEDICINE | Facility: CLINIC | Age: 71
End: 2021-12-22
Payer: MEDICARE

## 2021-12-27 ENCOUNTER — PATIENT MESSAGE (OUTPATIENT)
Dept: FAMILY MEDICINE | Facility: CLINIC | Age: 71
End: 2021-12-27
Payer: MEDICARE

## 2021-12-27 DIAGNOSIS — U07.1 COVID-19: Primary | ICD-10-CM

## 2021-12-29 ENCOUNTER — INFUSION (OUTPATIENT)
Dept: INFECTIOUS DISEASES | Facility: HOSPITAL | Age: 71
End: 2021-12-29
Attending: FAMILY MEDICINE
Payer: MEDICARE

## 2021-12-29 VITALS
DIASTOLIC BLOOD PRESSURE: 81 MMHG | SYSTOLIC BLOOD PRESSURE: 170 MMHG | HEART RATE: 61 BPM | RESPIRATION RATE: 18 BRPM | TEMPERATURE: 97 F | OXYGEN SATURATION: 97 %

## 2021-12-29 DIAGNOSIS — U07.1 COVID-19: ICD-10-CM

## 2021-12-29 PROCEDURE — 25000003 PHARM REV CODE 250: Mod: HCWC | Performed by: FAMILY MEDICINE

## 2021-12-29 PROCEDURE — M0243 CASIRIVI AND IMDEVI INFUSION: HCPCS | Performed by: FAMILY MEDICINE

## 2021-12-29 PROCEDURE — 63600175 PHARM REV CODE 636 W HCPCS: Mod: HCWC | Performed by: FAMILY MEDICINE

## 2021-12-29 RX ORDER — ALBUTEROL SULFATE 90 UG/1
2 AEROSOL, METERED RESPIRATORY (INHALATION)
Status: DISCONTINUED | OUTPATIENT
Start: 2021-12-29 | End: 2022-07-20

## 2021-12-29 RX ORDER — ONDANSETRON 4 MG/1
4 TABLET, ORALLY DISINTEGRATING ORAL ONCE AS NEEDED
Status: DISCONTINUED | OUTPATIENT
Start: 2021-12-29 | End: 2022-07-20

## 2021-12-29 RX ORDER — METHYLPREDNISOLONE SOD SUCC 125 MG
40 VIAL (EA) INJECTION ONCE AS NEEDED
Status: DISCONTINUED | OUTPATIENT
Start: 2021-12-29 | End: 2022-07-20

## 2021-12-29 RX ORDER — ACETAMINOPHEN 325 MG/1
650 TABLET ORAL ONCE AS NEEDED
Status: DISCONTINUED | OUTPATIENT
Start: 2021-12-29 | End: 2022-07-20

## 2021-12-29 RX ORDER — DIPHENHYDRAMINE HYDROCHLORIDE 50 MG/ML
25 INJECTION INTRAMUSCULAR; INTRAVENOUS ONCE AS NEEDED
Status: DISCONTINUED | OUTPATIENT
Start: 2021-12-29 | End: 2022-07-20

## 2021-12-29 RX ORDER — SODIUM CHLORIDE 0.9 % (FLUSH) 0.9 %
10 SYRINGE (ML) INJECTION
Status: DISCONTINUED | OUTPATIENT
Start: 2021-12-29 | End: 2022-07-20

## 2021-12-29 RX ORDER — EPINEPHRINE 0.3 MG/.3ML
0.3 INJECTION SUBCUTANEOUS
Status: DISCONTINUED | OUTPATIENT
Start: 2021-12-29 | End: 2022-07-20

## 2021-12-29 RX ADMIN — CASIRIVIMAB AND IMDEVIMAB 600 MG: 600; 600 INJECTION, SOLUTION, CONCENTRATE INTRAVENOUS at 02:12

## 2021-12-30 ENCOUNTER — HOSPITAL ENCOUNTER (EMERGENCY)
Facility: HOSPITAL | Age: 71
Discharge: HOME | End: 2021-12-31
Payer: MEDICARE

## 2021-12-30 DIAGNOSIS — R03.0 ELEVATED BLOOD PRESSURE READING: Primary | ICD-10-CM

## 2021-12-30 DIAGNOSIS — U07.1 COVID-19 VIRUS INFECTION: ICD-10-CM

## 2021-12-30 DIAGNOSIS — R51.9 NONINTRACTABLE HEADACHE, UNSPECIFIED CHRONICITY PATTERN, UNSPECIFIED HEADACHE TYPE: ICD-10-CM

## 2021-12-30 PROCEDURE — 99283 EMERGENCY DEPT VISIT LOW MDM: CPT | Mod: 25,HCWC

## 2021-12-31 VITALS
BODY MASS INDEX: 23.56 KG/M2 | DIASTOLIC BLOOD PRESSURE: 97 MMHG | RESPIRATION RATE: 20 BRPM | TEMPERATURE: 99 F | HEART RATE: 64 BPM | HEIGHT: 64 IN | WEIGHT: 138 LBS | OXYGEN SATURATION: 96 % | SYSTOLIC BLOOD PRESSURE: 171 MMHG

## 2021-12-31 PROCEDURE — 25000003 PHARM REV CODE 250: Mod: HCWC

## 2021-12-31 PROCEDURE — 25000003 PHARM REV CODE 250: Mod: HCWC | Performed by: EMERGENCY MEDICINE

## 2021-12-31 RX ORDER — HYDRALAZINE HYDROCHLORIDE 10 MG/1
10 TABLET, FILM COATED ORAL 3 TIMES DAILY PRN
Qty: 20 TABLET | Refills: 0 | Status: SHIPPED | OUTPATIENT
Start: 2021-12-31 | End: 2022-01-18 | Stop reason: SDUPTHER

## 2021-12-31 RX ORDER — HYDRALAZINE HYDROCHLORIDE 25 MG/1
TABLET, FILM COATED ORAL
Status: COMPLETED
Start: 2021-12-31 | End: 2021-12-31

## 2021-12-31 RX ORDER — HYDRALAZINE HYDROCHLORIDE 25 MG/1
25 TABLET, FILM COATED ORAL
Status: COMPLETED | OUTPATIENT
Start: 2021-12-31 | End: 2021-12-31

## 2021-12-31 RX ADMIN — HYDRALAZINE HYDROCHLORIDE 25 MG: 25 TABLET, FILM COATED ORAL at 12:12

## 2021-12-31 RX ADMIN — HYDRALAZINE HYDROCHLORIDE: 25 TABLET, FILM COATED ORAL at 12:12

## 2021-12-31 NOTE — ED PROVIDER NOTES
Encounter Date: 12/30/2021       History     Chief Complaint   Patient presents with    Headache     Patient is covid positive, had antibody infusion yesterday and her blood pressure has been high since and is concerned      Patient is a 71-year-old female here for evaluation and treatment of headache and high blood pressure.  Patient denies any history of high blood pressure, but states that since she got the monoclonal antibody infusion for COVID 19 2 days ago, she has been having intermittent episodes of elevated blood pressure and headache.  Denies any neurologic symptoms such as blurred vision, gait disturbance, dizziness, weakness, etc..  No chest pains or palpitations.  She states that she does not have any symptoms of COVID-19 at this time.        Review of patient's allergies indicates:   Allergen Reactions    Sulfa (sulfonamide antibiotics) Other (See Comments)     Past Medical History:   Diagnosis Date    Breast cancer 2000    also in 2013    Depression     Hypertriglyceridemia 7/21/2019    Nephrolithiasis 2/5/2015    Osteopenia      Past Surgical History:   Procedure Laterality Date    ADENOIDECTOMY      BILATERAL OOPHORECTOMY      benign    BREAST LUMPECTOMY  2000    Right breast    BREAST RECONSTRUCTION Bilateral 2013    Femoral hernia  2010    right side repair    HEMORRHOID SURGERY      HYSTERECTOMY  1998    KELSEY, fibroid    MASTECTOMY  2013     bilateral     OOPHORECTOMY      TONSILLECTOMY       Family History   Problem Relation Age of Onset    Breast cancer Mother         Breast cancer-75yrs    Hypertension Father     Heart disease Father 56        MI    Cancer Maternal Aunt         ovarian    Ovarian cancer Maternal Aunt     Cancer Maternal Grandfather         testicular / prostate    Stroke Paternal Grandmother     Breast cancer Maternal Grandmother         Breast cancer-42yrs    Parkinsonism Paternal Grandfather     No Known Problems Sister     Skin cancer Brother      Hyperlipidemia Son         As a child    No Known Problems Daughter     No Known Problems Daughter     No Known Problems Sister     No Known Problems Brother     Amblyopia Neg Hx     Blindness Neg Hx     Cataracts Neg Hx     Diabetes Neg Hx     Glaucoma Neg Hx     Macular degeneration Neg Hx     Retinal detachment Neg Hx     Strabismus Neg Hx     Thyroid disease Neg Hx     Colon cancer Neg Hx     Melanoma Neg Hx      Social History     Tobacco Use    Smoking status: Never Smoker    Smokeless tobacco: Never Used   Substance Use Topics    Alcohol use: Yes     Alcohol/week: 1.0 standard drink     Types: 1 Glasses of wine per week     Comment: Occasionally    Drug use: No     Review of Systems   Constitutional: Negative for chills, fatigue and fever.   HENT: Negative for congestion, rhinorrhea and sore throat.    Eyes: Negative for photophobia, redness and visual disturbance.   Respiratory: Negative for cough, chest tightness and shortness of breath.    Cardiovascular: Negative for chest pain and palpitations.   Gastrointestinal: Negative for abdominal pain, constipation, diarrhea, nausea and vomiting.   Endocrine: Negative for polydipsia and polyuria.   Genitourinary: Negative for dysuria, flank pain, frequency and hematuria.   Musculoskeletal: Negative for arthralgias, myalgias and neck pain.   Skin: Negative for pallor and rash.   Neurological: Positive for headaches. Negative for dizziness, syncope, weakness and numbness.   Psychiatric/Behavioral: Negative for confusion and self-injury. The patient is not nervous/anxious.        Physical Exam     Initial Vitals [12/30/21 1909]   BP Pulse Resp Temp SpO2   (!) 180/92 64 20 98.5 °F (36.9 °C) 96 %      MAP       --         Physical Exam    Nursing note and vitals reviewed.  Constitutional: She appears well-developed and well-nourished. She is not diaphoretic. No distress.   HENT:   Head: Normocephalic and atraumatic.   Nose: Nose normal.    Mouth/Throat: Oropharynx is clear and moist. No oropharyngeal exudate.   Eyes: Conjunctivae and EOM are normal. Pupils are equal, round, and reactive to light. No scleral icterus.   Neck: Neck supple. No JVD present.   Normal range of motion.  Cardiovascular: Normal rate, regular rhythm, normal heart sounds and intact distal pulses.   No murmur heard.  Pulmonary/Chest: Breath sounds normal. No stridor. No respiratory distress. She has no wheezes. She has no rhonchi. She has no rales. She exhibits no tenderness.   Abdominal: Abdomen is soft. Bowel sounds are normal.   Musculoskeletal:         General: No tenderness or edema. Normal range of motion.      Cervical back: Normal range of motion and neck supple.     Neurological: She is oriented to person, place, and time. She has normal strength and normal reflexes. No cranial nerve deficit or sensory deficit. GCS score is 15. GCS eye subscore is 4. GCS verbal subscore is 5. GCS motor subscore is 6.   Skin: Skin is warm and dry. Capillary refill takes less than 2 seconds. No rash noted. No erythema.   Psychiatric: She has a normal mood and affect. Her behavior is normal. Thought content normal.   Patient is slightly anxious about her condition         ED Course   Procedures  Labs Reviewed - No data to display       Imaging Results    None          Medications   hydrALAZINE tablet 25 mg (has no administration in time range)   hydrALAZINE (APRESOLINE) 25 MG tablet (has no administration in time range)     Medical Decision Making:   Differential Diagnosis:   Medication reaction, hypertension, anxiety, etc.  ED Management:  While here, patient's blood pressures have varied widely.  I believe that part of the problem is anxiety over the fact that her blood pressure has been slightly elevated since she got the antibody infusion.  She has no neurologic symptoms.  Discussed workup for end-organ damage with the patient but she did not want this.  Here, patient was given  hydralazine 12.5 mg and blood pressure has improved.  Will discharge home with p.r.n. hydralazine and patient will follow-up with primary care on Monday.  She was encouraged to return here over the weekend if she has any further difficulties or problems.  She will take over-the-counter Tylenol and Motrin for any other symptoms.                      Clinical Impression:   Final diagnoses:  [R03.0] Elevated blood pressure reading (Primary)  [U07.1] COVID-19 virus infection  [R51.9] Nonintractable headache, unspecified chronicity pattern, unspecified headache type          ED Disposition Condition    Discharge Stable        ED Prescriptions     Medication Sig Dispense Start Date End Date Auth. Provider    hydrALAZINE (APRESOLINE) 10 MG tablet Take 1 tablet (10 mg total) by mouth 3 (three) times daily as needed (Systolic greater than 150 or diastolic greater than 100). 20 tablet 12/31/2021 1/15/2022 Reilly Thomas MD        Follow-up Information     Follow up With Specialties Details Why Contact Info    Micha Marshall MD Family Medicine In 3 days  [address] [address]  [phone]      [facility] Emergency Dept Emergency Medicine  If symptoms worsen [address] [address] [phone]           Reilly Thomas MD  12/31/21 0048

## 2021-12-31 NOTE — DISCHARGE INSTRUCTIONS
As we discussed, take hydralazine up to 3 times daily as needed for blood pressure greater than 150 systolic or greater than 100 diastolic.  Follow-up with your PCP on Monday, and return here over the weekend if you are worse in any way.  For headaches, or any other discomfort, you can take alternating Tylenol and Motrin.

## 2022-01-03 ENCOUNTER — OFFICE VISIT (OUTPATIENT)
Dept: CARDIOLOGY | Facility: CLINIC | Age: 72
End: 2022-01-03
Payer: MEDICARE

## 2022-01-03 ENCOUNTER — PATIENT OUTREACH (OUTPATIENT)
Dept: ADMINISTRATIVE | Facility: OTHER | Age: 72
End: 2022-01-03
Payer: MEDICARE

## 2022-01-03 VITALS
HEIGHT: 63 IN | HEART RATE: 60 BPM | SYSTOLIC BLOOD PRESSURE: 196 MMHG | WEIGHT: 136.88 LBS | OXYGEN SATURATION: 98 % | DIASTOLIC BLOOD PRESSURE: 92 MMHG | BODY MASS INDEX: 24.25 KG/M2 | RESPIRATION RATE: 18 BRPM

## 2022-01-03 DIAGNOSIS — Z91.89 CARDIOVASCULAR EVENT RISK: ICD-10-CM

## 2022-01-03 DIAGNOSIS — Z82.49 FAMILY HISTORY OF PREMATURE CAD: ICD-10-CM

## 2022-01-03 DIAGNOSIS — R94.31 NONSPECIFIC ABNORMAL ELECTROCARDIOGRAM (ECG) (EKG): ICD-10-CM

## 2022-01-03 DIAGNOSIS — U07.1 COVID-19: ICD-10-CM

## 2022-01-03 DIAGNOSIS — R03.0 ELEVATED BLOOD PRESSURE READING: Primary | ICD-10-CM

## 2022-01-03 PROCEDURE — 99205 PR OFFICE/OUTPT VISIT, NEW, LEVL V, 60-74 MIN: ICD-10-PCS | Mod: HCWC,25,S$GLB, | Performed by: INTERNAL MEDICINE

## 2022-01-03 PROCEDURE — 3080F PR MOST RECENT DIASTOLIC BLOOD PRESSURE >= 90 MM HG: ICD-10-PCS | Mod: HCWC,CPTII,S$GLB, | Performed by: INTERNAL MEDICINE

## 2022-01-03 PROCEDURE — 1126F AMNT PAIN NOTED NONE PRSNT: CPT | Mod: HCWC,CPTII,S$GLB, | Performed by: INTERNAL MEDICINE

## 2022-01-03 PROCEDURE — 1126F PR PAIN SEVERITY QUANTIFIED, NO PAIN PRESENT: ICD-10-PCS | Mod: HCWC,CPTII,S$GLB, | Performed by: INTERNAL MEDICINE

## 2022-01-03 PROCEDURE — 3080F DIAST BP >= 90 MM HG: CPT | Mod: HCWC,CPTII,S$GLB, | Performed by: INTERNAL MEDICINE

## 2022-01-03 PROCEDURE — 99205 OFFICE O/P NEW HI 60 MIN: CPT | Mod: HCWC,25,S$GLB, | Performed by: INTERNAL MEDICINE

## 2022-01-03 PROCEDURE — 93010 EKG 12-LEAD: ICD-10-PCS | Mod: HCWC,S$GLB,, | Performed by: INTERNAL MEDICINE

## 2022-01-03 PROCEDURE — 99999 PR PBB SHADOW E&M-EST. PATIENT-LVL V: CPT | Mod: PBBFAC,HCWC,, | Performed by: INTERNAL MEDICINE

## 2022-01-03 PROCEDURE — 3008F PR BODY MASS INDEX (BMI) DOCUMENTED: ICD-10-PCS | Mod: HCWC,CPTII,S$GLB, | Performed by: INTERNAL MEDICINE

## 2022-01-03 PROCEDURE — 3077F PR MOST RECENT SYSTOLIC BLOOD PRESSURE >= 140 MM HG: ICD-10-PCS | Mod: HCWC,CPTII,S$GLB, | Performed by: INTERNAL MEDICINE

## 2022-01-03 PROCEDURE — 3077F SYST BP >= 140 MM HG: CPT | Mod: HCWC,CPTII,S$GLB, | Performed by: INTERNAL MEDICINE

## 2022-01-03 PROCEDURE — 3008F BODY MASS INDEX DOCD: CPT | Mod: HCWC,CPTII,S$GLB, | Performed by: INTERNAL MEDICINE

## 2022-01-03 PROCEDURE — 1159F PR MEDICATION LIST DOCUMENTED IN MEDICAL RECORD: ICD-10-PCS | Mod: HCWC,CPTII,S$GLB, | Performed by: INTERNAL MEDICINE

## 2022-01-03 PROCEDURE — 99999 PR PBB SHADOW E&M-EST. PATIENT-LVL V: ICD-10-PCS | Mod: PBBFAC,HCWC,, | Performed by: INTERNAL MEDICINE

## 2022-01-03 PROCEDURE — 93010 ELECTROCARDIOGRAM REPORT: CPT | Mod: HCWC,S$GLB,, | Performed by: INTERNAL MEDICINE

## 2022-01-03 PROCEDURE — 1159F MED LIST DOCD IN RCRD: CPT | Mod: HCWC,CPTII,S$GLB, | Performed by: INTERNAL MEDICINE

## 2022-01-03 NOTE — PATIENT INSTRUCTIONS
Recommended Mediterranean dietEating Heart-Healthy Food: Using the DASH Plan  Eating for your heart doesnt have to be hard or boring. You just need to know how to make healthier choices. The DASH eating plan has been developed to help you do just that. DASH stands for Dietary Approaches to Stop Hypertension. It is a plan that has been proven to be healthier for your heart and to lower your risk for high blood pressure. It can also help lower your risk for cancer, heart disease, osteoporosis, and diabetes.  Choosing from Each Food Group  Choose foods from each of the food groups below each day. Try to get the recommended number of servings for each food group. The serving numbers are based on a diet of 2,000 calories a day. Talk to your doctor if youre unsure about your calorie needs.  Grains   Servings: 7-8 a day  A serving is:  · 1 slice bread  · 1 ounce dry cereal  · half a cup cooked rice or pasta  Best choices: Whole grains and any grains high in fiber.  Vegetables   Servings: 4-5 a day  A serving is:  · 1 cup raw leafy vegetable  · Half a cup cooked vegetable  · Three-quarter cup vegetable juice  Best choices: Fresh or frozen vegetable prepared without too much added salt or fat.    Fruits   Servings: 4-5 a day  A serving is:  · Three-quarter cup fruit juice  · 1 medium fruit  · One-quarter cup dried fruit  · One-half cup fresh, frozen, or canned fruit  Best choices: A variety of fresh fruits of different colors. Whole fruits are a much better choice than fruit juices.  Low-fat or Fat Free Dairy   Servings: 2-3 a day  A serving is:  · 8 ounces milk  · 1 cup yogurt  · One and a half ounces cheese  Best choices: Skim or 1% milk, low-fat or fat free yogurt or buttermilk, and low-fat cheeses.       Meat, Poultry, Fish   Servings: 2 or fewer a day  A serving is:  · 3 ounces cooked meat, poultry, or fish  Best choices: Lean meats and fish. Trim away visible fat. Broil, roast, or boil instead of frying. Remove skin  from poultry before eating.  Nuts, Seeds, Beans   Servings: 4-5 a week  A serving is:  · One third cup nuts (or one and a half ounces)  · 2 tablespoons sunflower seeds  · Half a cup cooked beans  Best choices: Dry roasted nuts with no salt added, lentils, kidney beans, garbanzo beans, and whole lind beans.    Fats and Oils   Servings: 2 a day  A serving is:  · 1 teaspoon vegetable oil  · 1 teaspoon soft margarine  · 1 tablespoon low-fat mayonnaise  · 1 teaspoon regular mayonnaise  · 2 tablespoons light salad dressing  · 1 tablespoon regular salad dressing  Best choices: Monounsaturated and polyunsaturated fats such as olive, canola, or safflower oil.  Sweets   Servings: 5 a week or fewer  A serving is:  · 1 tablespoon sugar, maple syrup, or honey  · 1 tablespoon jam or jelly  · 1 half-ounce jelly beans (about 15)  · 8 ounces lemonade  Best choices: Dried fruit can be a satisfying sweet. Choose low-fat sweets when possible. And watch your serving sizes!       Aerobic Exercise for a Healthy Heart  Exercise is a lot more than an energy booster and a stress reliever. It also strengthens your heart muscle, lowers your blood pressure and blood cholesterol, and burns calories.      Remember, some activity is better than none.     Choose an Aerobic Activity  Choose a nonstop activity that makes your heart and lungs work harder than they do when you rest or walk normally. This aerobic exercise can improve the way your heart and other muscles use oxygen. Make it fun by exercising with a friend and choosing an activity you enjoy. Here are some ideas:  · Walking  · Swimming  · Bicycling  · Stair climbing  · Dancing  · Jogging  Exercise Regularly  If you havent been exercising regularly,  get your doctors okay first. Then start slowly.  Here are some tips:  · Begin exercising 3 times a week for 5-10 minutes at a time.  · When you feel comfortable, add a few minutes each week.  · Slowly build up to exercising 3-4 times each  week for 20-40 minutes. Aim for a total of 150 or more minutes a week.  · Be sure to carry your nitroglycerin with you when you exercise.  · If you get angina when youre exercising, stop what youre doing, take your nitroglycerin, and call your doctor.  © 1611-2580 [employer], [address] [place] [id number]. All rights reserved. This information is not intended as a substitute for professional medical care. Always follow your healthcare professional's instructions.  Patient Education       Blood Pressure Testing and Measurement   Why is this procedure done?   Your heart pumps blood through your body. Blood pressure measures the pressure in your arteries when the heart beats. Your blood pressure has two numbers. The top number is the systolic number. It measures the highest amount of pressure in the artery when the heart is beating. The second number or bottom number is the diastolic number. It is the lowest pressure in the artery when the heart is resting.  Blood pressure is sometimes shortened to BP. A blood pressure measurement is taken using a special tool that may be referred to as a blood pressure cuff. You may have your BP checked:  · As part of a routine check-up to give your doctor a record of your BP  · To screen for high blood pressure  · For your doctor to see how well your drugs are working  What will the results be?   You can ask for the results of your BP measurement. Ask your doctor about the measurement results and what they mean.  What happens before the procedure?   · Avoid drinking fluids high in caffeine, like coffee or carbonated drinks, 30 minutes before the procedure.  · Tell your doctor if you are taking any drugs. Some drugs can affect your blood pressure.  · Go to the bathroom and empty your bladder before the test.  · Your doctor may ask you to rest for 5 minutes before having your blood pressure checked. Activities like running, jumping, or even walking may raise your  "heartbeat and may give a false result.  · While having the test, try not to talk or move. This may give a false-positive reading.  · Wear a shirt that will allow access to your upper arm.  What happens during the procedure?   · For this test, you will sit with your back supported and your legs uncrossed. You may be asked to rest your arm on a table at heart level.  · The BP cuff will be wrapped snugly around your upper arm. The person taking your BP will place a stethoscope in the crease of your elbow to listen for your pulse.  · The cuff will be inflated quickly and you will feel it squeezing your upper arm. The staff person will listen to your pulse and note the first sound while looking at the dial of the BP measuring tool. They will continue to let air out of the BP cuff while listening to your heartbeat and take note of the last sound.  · All the air is let out of the cuff after the last sound is heard. Your BP is written as the number heard at the first sound "over" the number heard at the second.  · Sometimes, a machine is used to check your blood pressure. The procedure is the same. The machine "listens" for your pulse instead of a person.  · Most often, blood pressure measurement will take 1 to 2 minutes.     What happens after the procedure?   · You may go home right away. Your doctor will tell you if you need more tests.  · The results will help your doctor understand if you have a problem with your blood pressure. Together you can make a plan for more care.  What care is needed at home?   · Your doctor may find you have a problem with your blood pressure. Then you may need to check your blood pressure at home. If so, you will learn how to do this. Be sure to keep a record of every blood pressure reading.  · Your doctor may give you drugs for high blood pressure. Take your drugs as ordered.  What follow-up care is needed?   · Your doctor may ask you to make visits to the office to check on your progress. " Be sure to keep these visits.  · Your doctor may ask for other tests if you have low or high blood pressure. This will help to decide the best treatment plan for you.  What lifestyle changes are needed?   · Keep a normal weight. If you are too heavy, lose weight.  · Keep cholesterol and blood sugar under control.  · If you smoke, stop smoking. Smoking can increase your blood pressure.  · Exercise regularly. A 30-minute workout each day can help control your blood pressure, manage your weight, and strengthen your heart.  What problems could happen?   High blood pressure can lead to heart attack or stroke.  Helpful tips   · You should have a blood pressure test at least once every 2 years to screen for high blood pressure. This should start at age 18.  · If you are diagnosed with high blood pressure, you should have blood pressure tests more often.  Where can I learn more?   [employer] [facility]  https://www.[employer].[place]ust[place].gov.au/health/ConditionsAndTreatments/blood-pressure-high-hypertension   FamilyDoctor.org  http://familydoctor.org/familydoctor/en/diseases-conditions/high-blood-pressure/diagnosis-tests/blood-pressure-monitoring-at-home.html   National Heart Lung and [facility] [place] ? Your Guide to Lowering High Blood Pressure  http://www.nhlbi.nih.gov/health/health-topics/topics/hbp   Last Reviewed Date   2021-02-10  Consumer Information Use and Disclaimer   This information is not specific medical advice and does not replace information you receive from your health care provider. This is only a brief summary of general information. It does NOT include all information about conditions, illnesses, injuries, tests, procedures, treatments, therapies, discharge instructions or life-style choices that may apply to you. You must talk with your health care provider for complete information about your health and treatment options. This information should not be used to decide whether or not to accept your health care  "providers advice, instructions or recommendations. Only your health care provider has the knowledge and training to provide advice that is right for you.  Copyright   Copyright © 2021 UpToDate, Inc. and its affiliates and/or licensors. All rights reserved.  Patient Education       Controlling Your Blood Pressure Through Lifestyle   The Basics   Written by the doctors and editors at [employer]   What does my lifestyle have to do with my blood pressure? -- The things you do and the foods you eat have a big effect on your blood pressure and your overall health. Following the right lifestyle can:  · Lower your blood pressure or keep you from getting high blood pressure in the first place  · Reduce your need for blood pressure medicines  · Make medicines for high blood pressure work better, if you do take them  · Lower the chances that you'll have a heart attack or stroke, or develop kidney disease  Which lifestyle choices will help lower my blood pressure? -- Here's what you can do:  · Lose weight (if you are overweight)  · Choose a diet rich in fruits, vegetables, and low-fat dairy products, and low in meats, sweets, and refined grains  · Eat less salt (sodium)  · Do something active for at least 30 minutes a day on most days of the week  · Limit the amount of alcohol you drink  If you have high blood pressure, it's also very important to quit smoking (if you smoke). Quitting smoking might not bring your blood pressure down. But it will lower the chances that you'll have a heart attack or stroke, and it will help you feel better and live longer.  Start low and go slow -- The changes listed above might sound like a lot, but don't worry. You don't have to change everything all at once. The key to improving your lifestyle is to "start low and go slow." Choose 1 small, specific thing to change and try doing it for a while. If it works for you, keep doing it until it becomes a habit. If it doesn't, don't give up. Choose " "something else to change and see how that goes.  Let's say, for example, that you would like to improve your diet. If you're the type of person who eats cheeseburgers and French fries all the time, you can't switch to eating just salads from one day to the next. When people try to make changes like that, they often fail. Then they feel frustrated and tend to give up. So instead of trying to change everything about your diet in 1 day, change 1 or 2 small things about your diet and give yourself time to get used to those changes. For instance, keep the cheeseburger but give up the French fries. Or eat the same things but cut your portions in half.  As you find things that you are able to change and stick with, keep adding new changes. In time, you will see that you can actually change a lot. You just have to get used to the changes slowly.  Lose weight -- When people think about losing weight, they sometimes make it more complicated than it really is. To lose weight, you have to either eat less or move more. If you do both of those things, it's even better. But there is no single weight-loss diet or activity that's better than any other. When it comes to weight loss, the most effective plan is the one that you'll stick with.  Improve your diet -- There is no single diet that is right for everyone. But in general, a healthy diet can include:  · Lots of fruits, vegetables, and whole grains  · Some beans, peas, lentils, chickpeas, and similar foods  · Some nuts, such as walnuts, almonds, and peanuts  · Fat-free or low-fat milk and milk products  · Some fish  To have a healthy diet, it's also important to limit or avoid sugar, sweets, meats, and refined grains. (Refined grains are found in white bread, white rice, most forms of pasta, and most packaged "snack" foods.)  Reduce salt -- Many people think that eating a low-sodium diet means avoiding the salt shaker and not adding salt when cooking. The truth is, not adding " "salt at the table or when you cook will only help a little. Almost all of the sodium you eat is already in the food you buy at the grocery store or at restaurants (figure 1).  The most important thing you can do to cut down on sodium is to eat less processed food. That means that you should avoid most foods that are sold in cans, boxes, jars, and bags. You should also eat in restaurants less often.  To reduce the amount of sodium you get, buy fresh or fresh-frozen fruits, vegetables, and meats. (Fresh-frozen foods have had nothing added to them before freezing.) Then you can make meals at home, from scratch, with these ingredients.  As with the other changes, don't try to cut out salt all at once. Instead, choose 1 or 2 foods that have a lot of sodium and try to replace them with low-sodium choices. When you get used to those low-sodium options, find another food or 2 to change. Then keep going, until all the foods you eat are sodium-free or low in sodium.  Become more active -- If you want to be more active, you don't have to go to the gym or get all sweaty. It is possible to increase your activity level while doing everyday things you enjoy. Walking, gardening, and dancing are just a few of the things that you might try. As with all the other changes, the key is not to do too much too fast. If you don't do any activity now, start by walking for just a few minutes every other day. Do that for a few weeks. If you stick with it, try doing it for longer. But if you find that you don't like walking, try a different activity.  Drink less alcohol -- If you are a woman, do not have more than 1 "standard drink" of alcohol a day. If you are a man, do not have more than 2. A "standard drink" is:  · A can or bottle that has 12 ounces of beer  · A glass that has 5 ounces of wine  · A shot that has 1.5 ounces of whiskey  Where should I start? -- If you want to improve your lifestyle, start by making the changes that you think " "would be easiest for you. If you used to exercise and just got out of the habit, maybe it would be easy for you to start exercising again. Or if you actually like cooking meals from scratch, maybe the first thing you should focus on is eating home-cooked meals that are low in sodium.  Whatever you tackle first, choose specific, realistic goals, and give yourself a deadline. For example, do not decide that you are going to "exercise more." Instead, decide that you are going to walk for 10 minutes on Monday, Wednesday, and Friday, and that you are going to do this for the next 2 weeks.  When lifestyle changes are too general, people have a hard time following through.  Now go. You can do it!  All topics are updated as new evidence becomes available and our peer review process is complete.  This topic retrieved from [employer] on: Sep 21, 2021.  Topic 98883 Version 8.0  Release: 29.4.2 - C29.263  © 2021 UpToDate, Inc. and/or its affiliates. All rights reserved.  figure 1: Sources of sodium in your diet     Graphic 07099 Version 2.0    Consumer Information Use and Disclaimer   This information is not specific medical advice and does not replace information you receive from your health care provider. This is only a brief summary of general information. It does NOT include all information about conditions, illnesses, injuries, tests, procedures, treatments, therapies, discharge instructions or life-style choices that may apply to you. You must talk with your health care provider for complete information about your health and treatment options. This information should not be used to decide whether or not to accept your health care provider's advice, instructions or recommendations. Only your health care provider has the knowledge and training to provide advice that is right for you. The use of this information is governed by the [employer] End User License Agreement, available at " https://www.w[employer]tersGra[employer]ser.com/en/solutions/lexicomp/about/osorio.The use of [employer] content is governed by the [employer] Terms of Use. ©2021 UpToDate, Inc. All rights reserved.  Copyright   © 2021 UpToDate, Inc. and/or its affiliates. All rights reserved.  Patient Education       High Potassium Diet   About this topic   Potassium is a mineral found in many foods. You can find it in whole grains, fruits, and vegetables. It is also found in milk, dried beans, and peas. Potassium helps to keep blood pressure normal. It also helps muscles, like the heart, to work the right way. Potassium stops too much calcium from being lost through your urine.  General   You need to know how much potassium is in the food you eat. Read the food labels with care to see if they list how much potassium is in a serving. Reading the labels will help you make healthy food choices.  Be sure to follow your doctor's orders about how much potassium you are to eat.       What will the results be?   Your doctor is ordering a special diet for you. This will help control your health problem. Your potassium levels will be in a normal range.  What changes to diet are needed?   You will have to watch how much potassium is in the foods you eat. Your doctor will talk to you about the right amount of potassium for you.  When is this diet used?   This diet is used when your potassium level is low.  What foods are good to eat?   High Potassium Foods: These foods have more than 200 mg of potassium per serving.  High Potassium Fruits    Apricots   Raw ? 2 medium   Dry ? 5 halves [place] ? 1 medium   Avocado ? 1/4 whole Nectarine ? 1 medium   Banana ? 1/2 whole Orange ? 1 medium   Cantaloupe ? 1/2 cup (120 grams) Orange juice ? 1/2 cup (120 mL)   Dates ? 5 whole Papaya ? 1/2 whole   Dried fruits ? 1/2 cup (120 grams) Pomegranate ? 1 whole   Figs, dried ? 1/2 cup (120 grams) Pomegranate juice ? 1/2 cup (120 mL)   Grapefruit juice ? 1/2 cup (120 mL) Prunes ? 1/2  cup (120 grams)   Honeydew ? 1/2 cup (120 grams) Prune juice ? 1/2 cup (120 mL)   Kiwi ? 1 medium Raisins ? 1/2 cup (120 grams)   High Potassium Vegetables (1 serving is 1/2 cup or 120 grams)   Teague squash Beets, fresh and canned   Artichoke Black beans   Bamboo shoots, raw [place] sprouts, fresh and frozen   Baked beans Chinese cabbage   [place] squash Carrots, raw   Dried beans and peas Potatoes, white and sweet   Greens, except kale Pumpkin   [place] squash Refried beans   Kohlrabi Rutabagas   Lentils Spinach, cooked, canned, and frozen   Legumes Tomatoes, tomato products   Mushrooms, canned Vegetable juices (120 mL)   Parsnips Okra   High Potassium Other Foods and Drinks    Bran/bran products ? 1/2 cup (120 grams) Nuts and seeds ? 1 ounce (30 grams)   Chocolate ? 1.5 to 2 ounces (45 to 60 mL) Peanut butter ? 2 tablespoons (30 grams)   Granola ? 1/2 cup (120 grams) Salt substitutes ? 1/2 teaspoon (2.5 grams)   Milk, all types ? 1 cup (240 mL) Salt-free broth ? 1/2 cup (120 mL)   Molasses ? 1 tablespoon (15 mL) Yogurt ? 1/2 cup (120 mL)   Nutritional supplements   Low Potassium Foods: Eating more than one serving of these foods can make it a high potassium food. These foods have less than 100 mg of potassium per serving.  Low Potassium Fruits    Apple ? 1 medium Grapefruit ? 1/2 whole   Apple juice ? 1/2 cup (120 mL) Mandarin oranges ? 1/2 cup (120 grams)   Applesauce ? 1/2 cup (120 mL) Peaches   Fresh ? 1 small   Canned ? 1/2 cup (120 grams)   Apricots, canned in juice ? 1/2 cup (120 grams) Pears   Fresh ? 1 small   Canned ? 1/2 cup (120 grams)   Blackberries Pineapple ? 1/2 cup (120 grams)   Blueberries ? 1 cup (240 grams) Pineapple juice ? 1/2 cup (120 mL)   Cherries ? 10 cherries Plums ? 1 whole   Cranberries ? 1/2 cup (120 grams) Raspberries ? 1/2 cup (120 grams)   Fruit cocktail ? 1/2 cup (120 grams) Strawberries ? 1/2 cup (120 grams)   Grapes ? 1/2 cup (120 grams) Tangerine ? 1 whole   Grape juice ?  1 cup (240 mL) Watermelon ? Limit to 1 cup (240 grams)   Low Potassium Vegetables (1 serving is 1/2 cup or 120 grams)   Alfalfa sprouts Lettuce   Asparagus ? 6 mendez Mixed vegetables   Beans, wax or green Mushrooms, fresh   Cabbage, green and red Onions   Carrots, cooked Parsley   Cauliflower Peas, green   Celery ? 1 stalk Peppers   Corn   Fresh ? 1/2 ear   Frozen ? 1/2 cup (120 grams) Radish   Cucumber Rhubarb   Eggplant Water chestnuts, canned   Kale Watercress   Low Potassium Other Foods and Drinks    Rice Coffee ? limit to 8 ounces (24 mL)   Noodles Pies without chocolate or high-potassium foods   Pasta Cookies without nuts or chocolate   Bread and bread products (not whole grains) Tea ? limit to 16 ounces (480 mL)   Cake ? mary, yellow   Where can I learn more?   Academy of Nutrition and Dietetics  https://www.eatright.org/food/vitamins-and-supplements/types-of-vitamins-and-nutrients/what-is-potassium   National [place] [employer]  https://ods.od.nih.gov/factsheets/Potassium-Consumer/   Last Reviewed Date   2021-06-18  Consumer Information Use and Disclaimer   This information is not specific medical advice and does not replace information you receive from your health care provider. This is only a brief summary of general information. It does NOT include all information about conditions, illnesses, injuries, tests, procedures, treatments, therapies, discharge instructions or life-style choices that may apply to you. You must talk with your health care provider for complete information about your health and treatment options. This information should not be used to decide whether or not to accept your health care providers advice, instructions or recommendations. Only your health care provider has the knowledge and training to provide advice that is right for you.  Copyright   Copyright © 2021 UpToDate, Inc. and its affiliates and/or licensors. All rights reserved.

## 2022-01-03 NOTE — PROGRESS NOTES
Subjective:    Patient ID:  Eva Joseph is a 72 y.o. female who presents for evaluation of Establish Care (Blood pressure)  Post ED visit 12/30/2021, recent HTN post monoclonal antibodies infusion for break through COVID.  PCP: Micha Marshall MD  Lives with , Alessio, non-smoker  Retired RN, post-op recovery.    Patient is a new patient to me.    Health literacy: high  Vaccinations: up-to-date, completed COVID   Activities: to gym 2-3 times weekly, 30 minutes, line-dancing for 90 minutes twice weekly. Just starting [employer] ball, no problem, not limited  Nicotine: never   Alcohol: occasionally, 2-3 glasses per week, max 2 glasses in any 24 hours.  Illicit drugs: none  Cardiac symptoms: none, recent HA and HTN  Home BP: 140 to 150, using prn Apresoline for SBP > 150/100  Medication compliance: yes, no regular cardiac medication.  Diet: regular, use salt  Caffeine: 2 cpd  Labs:   Lab Results   Component Value Date    TSH 2.773 12/16/2021      No results found for: LABA1C, HGBA1C    Lab Results   Component Value Date    WBC 6.23 07/13/2020    HGB 14.8 07/13/2020    HCT 46.5 07/13/2020    MCV 89 07/13/2020     07/13/2020       CMP  Sodium   Date Value Ref Range Status   12/16/2021 141 136 - 145 mmol/L Final     Potassium   Date Value Ref Range Status   12/16/2021 4.0 3.5 - 5.1 mmol/L Final     Chloride   Date Value Ref Range Status   12/16/2021 104 95 - 110 mmol/L Final     CO2   Date Value Ref Range Status   12/16/2021 24 23 - 29 mmol/L Final     Glucose   Date Value Ref Range Status   12/16/2021 84 70 - 110 mg/dL Final     BUN   Date Value Ref Range Status   12/16/2021 13 8 - 23 mg/dL Final     Creatinine   Date Value Ref Range Status   12/16/2021 0.7 0.5 - 1.4 mg/dL Final     Calcium   Date Value Ref Range Status   12/16/2021 10.2 8.7 - 10.5 mg/dL Final     Total Protein   Date Value Ref Range Status   12/16/2021 7.4 6.0 - 8.4 g/dL Final     Albumin   Date Value Ref Range Status   12/16/2021 4.2  3.5 - 5.2 g/dL Final     Total Bilirubin   Date Value Ref Range Status   12/16/2021 0.6 0.1 - 1.0 mg/dL Final     Comment:     For infants and newborns, interpretation of results should be based  on gestational age, weight and in agreement with clinical  observations.    Premature Infant recommended reference ranges:  Up to 24 hours.............<8.0 mg/dL  Up to 48 hours............<12.0 mg/dL  3-5 days..................<15.0 mg/dL  6-29 days.................<15.0 mg/dL       Alkaline Phosphatase   Date Value Ref Range Status   12/16/2021 70 55 - 135 U/L Final     AST   Date Value Ref Range Status   12/16/2021 23 10 - 40 U/L Final     ALT   Date Value Ref Range Status   12/16/2021 20 10 - 44 U/L Final     Anion Gap   Date Value Ref Range Status   12/16/2021 13 8 - 16 mmol/L Final     eGFR if    Date Value Ref Range Status   12/16/2021 >60.0 >60 mL/min/1.73 m^2 Final     eGFR if non    Date Value Ref Range Status   12/16/2021 >60.0 >60 mL/min/1.73 m^2 Final     Comment:     Calculation used to obtain the estimated glomerular filtration  rate (eGFR) is the CKD-EPI equation.        @labrcntip(troponini)@  No results found for: BNP}   Lab Results   Component Value Date    CHOL 202 (H) 08/03/2020    CHOL 194 07/16/2019    CHOL 196 07/09/2018     Lab Results   Component Value Date    HDL 61 08/03/2020    HDL 60 07/16/2019    HDL 62 07/09/2018     Lab Results   Component Value Date    LDLCALC 120.8 08/03/2020    LDLCALC 100.8 07/16/2019    LDLCALC 104.4 07/09/2018     Lab Results   Component Value Date    TRIG 101 08/03/2020    TRIG 166 (H) 07/16/2019    TRIG 148 07/09/2018     Lab Results   Component Value Date    CHOLHDL 30.2 08/03/2020    CHOLHDL 30.9 07/16/2019    CHOLHDL 31.6 07/09/2018      Recent UA negative for protein.    Last Echo: none  Last stress test: none  Cardiovascular angiogram: none  ECG: SB, rate 54, late transition.  Fundoscopic exam: within the past year, negative for  "retinopathy    [facility] refer for ED follow up post breakthrough COVID and after receiving monoclonal antibodies. Complaint of HA with elevated BP. No prior history for HTN. Have premature family history for CAD with father having CAD starting age 55.     ED noted "Headache       Patient is covid positive, had antibody infusion yesterday and her blood pressure has been high since and is concerned      Patient is a 71-year-old female here for evaluation and treatment of headache and high blood pressure.  Patient denies any history of high blood pressure, but states that since she got the monoclonal antibody infusion for COVID 19 2 days ago, she has been having intermittent episodes of elevated blood pressure and headache.  Denies any neurologic symptoms such as blurred vision, gait disturbance, dizziness, weakness, etc..  No chest pains or palpitations.    While here, patient's blood pressures have varied widely.  I believe that part of the problem is anxiety over the fact that her blood pressure has been slightly elevated since she got the antibody infusion.  She has no neurologic symptoms.  Discussed workup for end-organ damage with the patient but she did not want this.  Here, patient was given hydralazine 12.5 mg and blood pressure has improved.  Will discharge home with p.r.n. hydralazine and patient will follow-up with primary care on Monday."       Review of Systems   Constitutional: Positive for fever and malaise/fatigue. Negative for diaphoresis, night sweats and weight gain.   HENT: Positive for congestion and sore throat. Negative for nosebleeds and tinnitus.    Eyes: Positive for photophobia. Negative for visual disturbance.   Cardiovascular: Negative for chest pain, claudication, cyanosis, dyspnea on exertion, irregular heartbeat, leg swelling, near-syncope, orthopnea, palpitations and paroxysmal nocturnal dyspnea.   Respiratory: Positive for cough. Negative for shortness of breath, sleep disturbances due to " "breathing, snoring and wheezing.         [place] score 4   Endocrine: Negative for polydipsia and polyuria.   Hematologic/Lymphatic: Does not bruise/bleed easily.   Skin: Negative for color change, flushing, nail changes, poor wound healing and suspicious lesions.   Musculoskeletal: Negative for arthritis, falls, gout, joint pain, joint swelling, muscle cramps, muscle weakness and myalgias.   Gastrointestinal: Negative for heartburn, hematemesis, hematochezia, melena and nausea.   Neurological: Positive for excessive daytime sleepiness, headaches and light-headedness. Negative for disturbances in coordination, dizziness, focal weakness, loss of balance, numbness, vertigo and weakness.   Psychiatric/Behavioral: Negative for depression and substance abuse. The patient is nervous/anxious. The patient does not have insomnia.         Objective:    Physical Exam  Constitutional:       Appearance: She is well-developed and well-nourished.   HENT:      Head: Normocephalic.   Eyes:      Extraocular Movements: EOM normal.      Conjunctiva/sclera: Conjunctivae normal.      Pupils: Pupils are equal, round, and reactive to light.   Neck:      Thyroid: No thyromegaly.      Vascular: No JVD.      Comments: Circumference 13.5"  Cardiovascular:      Rate and Rhythm: Normal rate and regular rhythm.      Pulses: Intact distal pulses.           Carotid pulses are 1+ on the right side and 1+ on the left side.       Radial pulses are 1+ on the right side and 1+ on the left side.        Femoral pulses are 1+ on the right side and 1+ on the left side.       Popliteal pulses are 1+ on the right side and 1+ on the left side.        Dorsalis pedis pulses are 1+ on the right side and 1+ on the left side.        Posterior tibial pulses are 1+ on the right side and 1+ on the left side.      Heart sounds: Normal heart sounds. No murmur heard.  No friction rub. No gallop.    Pulmonary:      Effort: Pulmonary effort is normal.      Breath sounds: " "Normal breath sounds. No rales.   Chest:      Chest wall: No tenderness.   Abdominal:      General: Bowel sounds are normal.      Palpations: Abdomen is soft.      Tenderness: There is no abdominal tenderness.      Comments: Waist 33.5", hip 40"   Musculoskeletal:         General: No edema. Normal range of motion.      Cervical back: Normal range of motion and neck supple.   Lymphadenopathy:      Cervical: No cervical adenopathy.   Skin:     General: Skin is warm and dry.      Findings: No rash.   Neurological:      Mental Status: She is alert and oriented to person, place, and time.           Assessment:       1. Elevated blood pressure reading    2. COVID-19, breakthrough 12/25/2021    3. Cardiovascular event risk, ASCVD 10-years risk 16.5%, 2020    4. Nonspecific abnormal electrocardiogram (ECG) (EKG)    5. Family history of premature CAD         Plan:       Eva was seen today for establish care.    Diagnoses and all orders for this visit:    Elevated blood pressure reading  -     IN OFFICE EKG 12-LEAD (to Muse)  -     Echo    COVID-19, breakthrough 12/25/2021    Cardiovascular event risk, ASCVD 10-years risk 16.5%, 2020  -     CT Calcium Scoring Cardiac; Future    Nonspecific abnormal electrocardiogram (ECG) (EKG)  -     Echo    Family history of premature CAD  -     IN OFFICE EKG 12-LEAD (to Muse)  -     CT Calcium Scoring Cardiac; Future    - All medical issues reviewed, continue current Rx.  - Warning signs of MI and stroke given, if symptoms last more than 5 minutes, stop immediately and call 911, then chew 2-4 low-dose ASA (81 mg).  - Consider use of Potassium chloride salt substitute.  - CV status and all medications reviewed, patient acknowledge good understanding.  - Recommend healthy living: moderate alcohol, healthy diet and regular exercise aiming for fitness, restorative sleep and weight control  - Discussed healthy alcohol daily limit of 0.5 oz of pure alcohol in any 24 hours (roughly one " 12-oz beers, 5 oz of wine (8%-12% alcohol), or 0.75 oz (half a shot) of liquor (80 proof)), can not save up.  - Need good exercise program, 4 key elements: 1. Aerobic (walking, swimming, dancing, jogging, biking, etc, 2. Muscle strengthening / resistance exercise, need to do 2-3 times weekly, 3. Stretching daily, good stretch takes a whole  total minute. 4. Balance exercise daily.   - Instruction for Mediterranean diet and heart healthy exercise given.  - Check home blood pressure, 2 days weekly, do 2 readings within 5 minutes in AM and PM, keep log for review.  - Highly recommend 30-60 minutes of exercise / activities daily, can have Sunday off, with 2-3 sessions of muscle strengthening weekly. A  would be very helpful.  - Recommend at least annual cardiovascular evaluation in view of patient's significant risk factors.  - Phone review / encourage use of [facility]      Greater than 50% of the time was spent in counseling and coordination of care. The above assessment and plan have been discussed at length. ED provider's note reviewed. Labs and procedure over the last 6 months reviewed. Problem List updated. Asked to bring in all active medications / pills bottles with next visit.

## 2022-01-11 ENCOUNTER — PATIENT MESSAGE (OUTPATIENT)
Dept: HEMATOLOGY/ONCOLOGY | Facility: CLINIC | Age: 72
End: 2022-01-11
Payer: MEDICARE

## 2022-01-11 ENCOUNTER — HOSPITAL ENCOUNTER (OUTPATIENT)
Dept: CARDIOLOGY | Facility: HOSPITAL | Age: 72
Discharge: HOME | End: 2022-01-11
Attending: INTERNAL MEDICINE
Payer: MEDICARE

## 2022-01-11 ENCOUNTER — HOSPITAL ENCOUNTER (OUTPATIENT)
Dept: RADIOLOGY | Facility: HOSPITAL | Age: 72
Discharge: HOME | End: 2022-01-11
Attending: INTERNAL MEDICINE
Payer: MEDICARE

## 2022-01-11 ENCOUNTER — PATIENT MESSAGE (OUTPATIENT)
Dept: CARDIOLOGY | Facility: CLINIC | Age: 72
End: 2022-01-11
Payer: MEDICARE

## 2022-01-11 VITALS — HEIGHT: 63 IN | WEIGHT: 136 LBS | BODY MASS INDEX: 24.1 KG/M2

## 2022-01-11 DIAGNOSIS — Z91.89 CARDIOVASCULAR EVENT RISK: ICD-10-CM

## 2022-01-11 DIAGNOSIS — Z82.49 FAMILY HISTORY OF PREMATURE CAD: ICD-10-CM

## 2022-01-11 PROBLEM — I11.9 LVH (LEFT VENTRICULAR HYPERTROPHY) DUE TO HYPERTENSIVE DISEASE, WITHOUT HEART FAILURE: Status: ACTIVE | Noted: 2022-01-11

## 2022-01-11 PROBLEM — R93.1 AGATSTON CORONARY ARTERY CALCIUM SCORE BETWEEN 100 AND 199: Status: ACTIVE | Noted: 2022-01-11

## 2022-01-11 LAB
AORTIC ROOT ANNULUS: 2.4 CM
AORTIC VALVE CUSP SEPERATION: 1.72 CM
AV INDEX (PROSTH): 0.55
AV MEAN GRADIENT: 5 MMHG
AV PEAK GRADIENT: 9 MMHG
AV VALVE AREA: 1.71 CM2
AV VELOCITY RATIO: 0.59
BSA FOR ECHO PROCEDURE: 1.66 M2
CV ECHO LV RWT: 0.5 CM
DOP CALC AO PEAK VEL: 1.5 M/S
DOP CALC AO VTI: 36.6 CM
DOP CALC LVOT AREA: 3.1 CM2
DOP CALC LVOT DIAMETER: 1.99 CM
DOP CALC LVOT PEAK VEL: 0.88 M/S
DOP CALC LVOT STROKE VOLUME: 62.7 CM3
DOP CALC MV VTI: 21.9 CM
DOP CALCLVOT PEAK VEL VTI: 20.17 CM
E WAVE DECELERATION TIME: 255.26 MSEC
E/A RATIO: 0.9
E/E' RATIO: 9.63 M/S
ECHO LV POSTERIOR WALL: 1.12 CM (ref 0.6–1.1)
EJECTION FRACTION: 62 %
FRACTIONAL SHORTENING: 33 % (ref 28–44)
INTERVENTRICULAR SEPTUM: 1.21 CM (ref 0.6–1.1)
IVRT: 49.48 MSEC
LA MAJOR: 4 CM
LA MINOR: 3.55 CM
LEFT ATRIUM SIZE: 4.09 CM
LEFT ATRIUM VOLUME INDEX MOD: 14.8 ML/M2
LEFT ATRIUM VOLUME MOD: 24.25 CM3
LEFT INTERNAL DIMENSION IN SYSTOLE: 3 CM (ref 2.1–4)
LEFT VENTRICLE DIASTOLIC VOLUME INDEX: 56.62 ML/M2
LEFT VENTRICLE DIASTOLIC VOLUME: 92.85 ML
LEFT VENTRICLE MASS INDEX: 116 G/M2
LEFT VENTRICLE SYSTOLIC VOLUME INDEX: 21.3 ML/M2
LEFT VENTRICLE SYSTOLIC VOLUME: 34.92 ML
LEFT VENTRICULAR INTERNAL DIMENSION IN DIASTOLE: 4.51 CM (ref 3.5–6)
LEFT VENTRICULAR MASS: 190.53 G
LV LATERAL E/E' RATIO: 8.56 M/S
LV SEPTAL E/E' RATIO: 11 M/S
MV A" WAVE DURATION": 8.75 MSEC
MV MEAN GRADIENT: 0 MMHG
MV PEAK A VEL: 0.86 M/S
MV PEAK E VEL: 0.77 M/S
MV PEAK GRADIENT: 3 MMHG
MV STENOSIS PRESSURE HALF TIME: 64.59 MS
MV VALVE AREA BY CONTINUITY EQUATION: 2.86 CM2
MV VALVE AREA P 1/2 METHOD: 3.41 CM2
PISA MRMAX VEL: 0.04 M/S
PISA TR MAX VEL: 2 M/S
PV MEAN GRADIENT: 2 MMHG
PV PEAK VELOCITY: 0.75 CM/S
RA MAJOR: 3.95 CM
RA PRESSURE: 3 MMHG
RA WIDTH: 2.2 CM
RIGHT VENTRICULAR END-DIASTOLIC DIMENSION: 3.5 CM
TDI LATERAL: 0.09 M/S
TDI SEPTAL: 0.07 M/S
TDI: 0.08 M/S
TR MAX PG: 16 MMHG
TRICUSPID ANNULAR PLANE SYSTOLIC EXCURSION: 2.27 CM
TV REST PULMONARY ARTERY PRESSURE: 19 MMHG

## 2022-01-11 PROCEDURE — 93306 ECHO (CUPID ONLY): ICD-10-PCS | Mod: 26,HCWC,, | Performed by: INTERNAL MEDICINE

## 2022-01-11 PROCEDURE — 93306 TTE W/DOPPLER COMPLETE: CPT | Mod: 26,HCWC,, | Performed by: INTERNAL MEDICINE

## 2022-01-11 PROCEDURE — 75571 CT HRT W/O DYE W/CA TEST: CPT | Mod: TC,HCWC

## 2022-01-11 PROCEDURE — 93356 MYOCRD STRAIN IMG SPCKL TRCK: CPT | Mod: HCWC

## 2022-01-11 PROCEDURE — 75571 CT CALCIUM SCORING CARDIAC: ICD-10-PCS | Mod: 26,HCWC,, | Performed by: RADIOLOGY

## 2022-01-11 PROCEDURE — 93356 MYOCRD STRAIN IMG SPCKL TRCK: CPT | Mod: HCWC,,, | Performed by: INTERNAL MEDICINE

## 2022-01-11 PROCEDURE — 93356 ECHO (CUPID ONLY): ICD-10-PCS | Mod: HCWC,,, | Performed by: INTERNAL MEDICINE

## 2022-01-11 PROCEDURE — 75571 CT HRT W/O DYE W/CA TEST: CPT | Mod: 26,HCWC,, | Performed by: RADIOLOGY

## 2022-01-11 NOTE — PROGRESS NOTES
Abnormal scan, need review with Micha Marshall MD. Have clear evidence for coronary atherosclerosis with the calcium score of > 100 along with hypercholesterolemia. Highly recommend use of statin therapy with repeat lipid panel in 3 months. Dr. Whittington

## 2022-01-12 ENCOUNTER — PATIENT MESSAGE (OUTPATIENT)
Dept: FAMILY MEDICINE | Facility: CLINIC | Age: 72
End: 2022-01-12
Payer: MEDICARE

## 2022-01-12 DIAGNOSIS — Z85.3 HISTORY OF BREAST CANCER: Primary | ICD-10-CM

## 2022-01-13 ENCOUNTER — TELEPHONE (OUTPATIENT)
Dept: HEMATOLOGY/ONCOLOGY | Facility: CLINIC | Age: 72
End: 2022-01-13
Payer: MEDICARE

## 2022-01-13 ENCOUNTER — HOSPITAL ENCOUNTER (OUTPATIENT)
Dept: RADIOLOGY | Facility: HOSPITAL | Age: 72
Discharge: HOME | End: 2022-01-13
Attending: PHYSICIAN ASSISTANT
Payer: MEDICARE

## 2022-01-13 DIAGNOSIS — E04.1 THYROID NODULE: ICD-10-CM

## 2022-01-13 PROCEDURE — 76536 US SOFT TISSUE HEAD NECK THYROID: ICD-10-PCS | Mod: 26,HCNC,, | Performed by: RADIOLOGY

## 2022-01-13 PROCEDURE — 76536 US EXAM OF HEAD AND NECK: CPT | Mod: TC,HCNC

## 2022-01-13 PROCEDURE — 76536 US EXAM OF HEAD AND NECK: CPT | Mod: 26,HCNC,, | Performed by: RADIOLOGY

## 2022-01-13 NOTE — TELEPHONE ENCOUNTER
Spoke with patient to schedule lab appointment and to let her know that Nuclear Medicine will be contacting her to schedule the bone scan.

## 2022-01-18 ENCOUNTER — OFFICE VISIT (OUTPATIENT)
Dept: FAMILY MEDICINE | Facility: CLINIC | Age: 72
End: 2022-01-18
Payer: MEDICARE

## 2022-01-18 VITALS
BODY MASS INDEX: 24.31 KG/M2 | WEIGHT: 137.19 LBS | OXYGEN SATURATION: 96 % | RESPIRATION RATE: 12 BRPM | HEIGHT: 63 IN | HEART RATE: 63 BPM | SYSTOLIC BLOOD PRESSURE: 126 MMHG | DIASTOLIC BLOOD PRESSURE: 70 MMHG | TEMPERATURE: 98 F

## 2022-01-18 DIAGNOSIS — C50.912 BILATERAL MALIGNANT NEOPLASM OF BREAST IN FEMALE, UNSPECIFIED ESTROGEN RECEPTOR STATUS, UNSPECIFIED SITE OF BREAST: ICD-10-CM

## 2022-01-18 DIAGNOSIS — E21.0 PRIMARY HYPERPARATHYROIDISM: ICD-10-CM

## 2022-01-18 DIAGNOSIS — I70.0 AORTIC ATHEROSCLEROSIS: ICD-10-CM

## 2022-01-18 DIAGNOSIS — Z91.89 AT RISK FOR CARDIOVASCULAR EVENT: Primary | ICD-10-CM

## 2022-01-18 DIAGNOSIS — R03.0 ELEVATED BLOOD PRESSURE READING: ICD-10-CM

## 2022-01-18 DIAGNOSIS — C50.911 BILATERAL MALIGNANT NEOPLASM OF BREAST IN FEMALE, UNSPECIFIED ESTROGEN RECEPTOR STATUS, UNSPECIFIED SITE OF BREAST: ICD-10-CM

## 2022-01-18 PROCEDURE — 3078F DIAST BP <80 MM HG: CPT | Mod: HCWC,CPTII,S$GLB, | Performed by: FAMILY MEDICINE

## 2022-01-18 PROCEDURE — 99214 OFFICE O/P EST MOD 30 MIN: CPT | Mod: HCWC,S$GLB,, | Performed by: FAMILY MEDICINE

## 2022-01-18 PROCEDURE — 3288F PR FALLS RISK ASSESSMENT DOCUMENTED: ICD-10-PCS | Mod: HCWC,CPTII,S$GLB, | Performed by: FAMILY MEDICINE

## 2022-01-18 PROCEDURE — 3078F PR MOST RECENT DIASTOLIC BLOOD PRESSURE < 80 MM HG: ICD-10-PCS | Mod: HCWC,CPTII,S$GLB, | Performed by: FAMILY MEDICINE

## 2022-01-18 PROCEDURE — 99499 UNLISTED E&M SERVICE: CPT | Mod: S$GLB,,, | Performed by: FAMILY MEDICINE

## 2022-01-18 PROCEDURE — 1126F AMNT PAIN NOTED NONE PRSNT: CPT | Mod: HCWC,CPTII,S$GLB, | Performed by: FAMILY MEDICINE

## 2022-01-18 PROCEDURE — 1160F PR REVIEW ALL MEDS BY PRESCRIBER/CLIN PHARMACIST DOCUMENTED: ICD-10-PCS | Mod: HCWC,CPTII,S$GLB, | Performed by: FAMILY MEDICINE

## 2022-01-18 PROCEDURE — 1101F PT FALLS ASSESS-DOCD LE1/YR: CPT | Mod: HCWC,CPTII,S$GLB, | Performed by: FAMILY MEDICINE

## 2022-01-18 PROCEDURE — 3288F FALL RISK ASSESSMENT DOCD: CPT | Mod: HCWC,CPTII,S$GLB, | Performed by: FAMILY MEDICINE

## 2022-01-18 PROCEDURE — 3074F SYST BP LT 130 MM HG: CPT | Mod: HCWC,CPTII,S$GLB, | Performed by: FAMILY MEDICINE

## 2022-01-18 PROCEDURE — 1159F PR MEDICATION LIST DOCUMENTED IN MEDICAL RECORD: ICD-10-PCS | Mod: HCWC,CPTII,S$GLB, | Performed by: FAMILY MEDICINE

## 2022-01-18 PROCEDURE — 3074F PR MOST RECENT SYSTOLIC BLOOD PRESSURE < 130 MM HG: ICD-10-PCS | Mod: HCWC,CPTII,S$GLB, | Performed by: FAMILY MEDICINE

## 2022-01-18 PROCEDURE — 3008F BODY MASS INDEX DOCD: CPT | Mod: HCWC,CPTII,S$GLB, | Performed by: FAMILY MEDICINE

## 2022-01-18 PROCEDURE — 99999 PR PBB SHADOW E&M-EST. PATIENT-LVL V: CPT | Mod: PBBFAC,HCWC,, | Performed by: FAMILY MEDICINE

## 2022-01-18 PROCEDURE — 99499 RISK ADDL DX/OHS AUDIT: ICD-10-PCS | Mod: S$GLB,,, | Performed by: FAMILY MEDICINE

## 2022-01-18 PROCEDURE — 1126F PR PAIN SEVERITY QUANTIFIED, NO PAIN PRESENT: ICD-10-PCS | Mod: HCWC,CPTII,S$GLB, | Performed by: FAMILY MEDICINE

## 2022-01-18 PROCEDURE — 1160F RVW MEDS BY RX/DR IN RCRD: CPT | Mod: HCWC,CPTII,S$GLB, | Performed by: FAMILY MEDICINE

## 2022-01-18 PROCEDURE — 3008F PR BODY MASS INDEX (BMI) DOCUMENTED: ICD-10-PCS | Mod: HCWC,CPTII,S$GLB, | Performed by: FAMILY MEDICINE

## 2022-01-18 PROCEDURE — 1101F PR PT FALLS ASSESS DOC 0-1 FALLS W/OUT INJ PAST YR: ICD-10-PCS | Mod: HCWC,CPTII,S$GLB, | Performed by: FAMILY MEDICINE

## 2022-01-18 PROCEDURE — 99214 PR OFFICE/OUTPT VISIT, EST, LEVL IV, 30-39 MIN: ICD-10-PCS | Mod: HCWC,S$GLB,, | Performed by: FAMILY MEDICINE

## 2022-01-18 PROCEDURE — 99999 PR PBB SHADOW E&M-EST. PATIENT-LVL V: ICD-10-PCS | Mod: PBBFAC,HCWC,, | Performed by: FAMILY MEDICINE

## 2022-01-18 PROCEDURE — 1159F MED LIST DOCD IN RCRD: CPT | Mod: HCWC,CPTII,S$GLB, | Performed by: FAMILY MEDICINE

## 2022-01-18 RX ORDER — ROSUVASTATIN CALCIUM 5 MG/1
5 TABLET, COATED ORAL DAILY
Qty: 90 TABLET | Refills: 3 | Status: SHIPPED | OUTPATIENT
Start: 2022-01-18 | End: 2022-10-27 | Stop reason: SDUPTHER

## 2022-01-18 RX ORDER — HYDRALAZINE HYDROCHLORIDE 10 MG/1
10 TABLET, FILM COATED ORAL 3 TIMES DAILY PRN
Qty: 20 TABLET | Refills: 0 | Status: SHIPPED | OUTPATIENT
Start: 2022-01-18 | End: 2022-02-15 | Stop reason: SDUPTHER

## 2022-01-18 NOTE — PROGRESS NOTES
[facility] - Clinic Note    Subjective      Ms. Joseph is a 72 y.o. female who presents to clinic for Follow-up (Discuss test results)    Discussed findings elevated coronary calcium score.  Patient has been doing well after COVID.  Has noted some fluctuation in blood pressure.    [facility] Eva has a past medical history of Breast cancer (2000), Depression, Hypertriglyceridemia (7/21/2019), Nephrolithiasis (2/5/2015), and Osteopenia.   PSXH Eva has a past surgical history that includes Breast lumpectomy (2000); Hysterectomy (1998); Bilateral oophorectomy; Femoral hernia (2010); Mastectomy (2013 ); Tonsillectomy; Adenoidectomy; Hemorrhoid surgery; Breast reconstruction (Bilateral, 2013); and Oophorectomy.    Eva's family history includes Breast cancer in her maternal grandmother and mother; Cancer in her maternal aunt and maternal grandfather; Heart disease (age of onset: 56) in her father; Hyperlipidemia in her son; Hypertension in her father; No Known Problems in her brother, daughter, daughter, sister, and sister; Ovarian cancer in her maternal aunt; Parkinsonism in her paternal grandfather; Skin cancer in her brother; Stroke in her paternal grandmother.    Eva reports that she has never smoked. She has never used smokeless tobacco. She reports current alcohol use of about 1.0 standard drink of alcohol per week. She reports that she does not use drugs.   BILLY Velasquez is allergic to sulfa (sulfonamide antibiotics).   AMADOR Velasquez has a current medication list which includes the following prescription(s): alendronate, amino acids-minerals, ascorbic acid (vitamin c), aspirin, biotin, coenzyme q10-vitamin e, fish oil-dha-epa, glucosamine-chondroitin, hydrocortisone, hydroxyzine hcl, minoxidil, multivitamin, vitamin d3-vitamin k2, hydralazine, and rosuvastatin, and the following Facility-Administered Medications: acetaminophen, albuterol, diphenhydramine, epinephrine, methylprednisolone sodium  "succinate, ondansetron, sodium chloride 0.9% 500 mL flush bag, and sodium chloride 0.9%.     Review of Systems   Constitutional: Negative for chills and fever.   Respiratory: Negative for cough and shortness of breath.    Cardiovascular: Negative for chest pain.   Neurological: Negative for headaches.     Objective     /70 (BP Location: Left arm, Patient Position: Sitting, BP Method: Medium (Manual))   Pulse 63   Temp 97.9 °F (36.6 °C) (Oral)   Resp 12   Ht 5' 3" (1.6 m)   Wt 62.2 kg (137 lb 3.2 oz)   LMP  (LMP Unknown)   SpO2 96%   BMI 24.30 kg/m²     Physical Exam  Vitals and nursing note reviewed.   Constitutional:       General: She is not in acute distress.     Appearance: Normal appearance. She is well-developed. She is not diaphoretic.   HENT:      Head: Normocephalic and atraumatic.      Right Ear: External ear normal.      Left Ear: External ear normal.   Eyes:      General:         Right eye: No discharge.         Left eye: No discharge.   Cardiovascular:      Rate and Rhythm: Normal rate and regular rhythm.      Heart sounds: Normal heart sounds.   Pulmonary:      Effort: Pulmonary effort is normal.      Breath sounds: Normal breath sounds. No wheezing or rales.   Skin:     General: Skin is warm and dry.   Neurological:      Mental Status: She is alert and oriented to person, place, and time. Mental status is at baseline.   Psychiatric:         Mood and Affect: Mood normal.         Behavior: Behavior normal.         Thought Content: Thought content normal.         Judgment: Judgment normal.        Assessment/Plan     1. At risk for cardiovascular event     2. Elevated blood pressure reading  hydrALAZINE (APRESOLINE) 10 MG tablet    rosuvastatin (CRESTOR) 5 MG tablet   3. Bilateral malignant neoplasm of breast in female, unspecified estrogen receptor status, unspecified site of breast     4. Primary hyperparathyroidism     5. Aortic atherosclerosis       Will start Crestor 5 mg daily for " cardiovascular prophylaxis.  Continue monitoring blood pressure.  Follow-up in 1 month.    Future Appointments   Date Time Provider Department Center   1/21/2022 10:00 AM [facility] NM5 PREP ROOM [facility] SUZIE [facility]   1/21/2022 12:30 PM [facility] MG2 400LB LIMIT [facility]   2/14/2022  2:30 PM LEYLA Ghotra PA-C [facility] [place]   2/15/2022  9:40 AM Micha Marshall MD [facility]         Micha Marshall MD  Family Medicine  [facility]

## 2022-01-21 ENCOUNTER — PATIENT MESSAGE (OUTPATIENT)
Dept: HEMATOLOGY/ONCOLOGY | Facility: CLINIC | Age: 72
End: 2022-01-21
Payer: MEDICARE

## 2022-01-21 ENCOUNTER — HOSPITAL ENCOUNTER (OUTPATIENT)
Dept: RADIOLOGY | Facility: HOSPITAL | Age: 72
Discharge: HOME | End: 2022-01-21
Attending: INTERNAL MEDICINE
Payer: MEDICARE

## 2022-01-21 DIAGNOSIS — Z85.3 HISTORY OF BREAST CANCER: ICD-10-CM

## 2022-01-21 PROCEDURE — 78306 BONE IMAGING WHOLE BODY: CPT | Mod: TC,HCNC

## 2022-01-21 PROCEDURE — 78306 BONE IMAGING WHOLE BODY: CPT | Mod: 26,HCNC,, | Performed by: STUDENT IN AN ORGANIZED HEALTH CARE EDUCATION/TRAINING PROGRAM

## 2022-01-21 PROCEDURE — 78306 NM BONE SCAN WHOLE BODY: ICD-10-PCS | Mod: 26,HCNC,, | Performed by: STUDENT IN AN ORGANIZED HEALTH CARE EDUCATION/TRAINING PROGRAM

## 2022-01-21 PROCEDURE — A9503 TC99M MEDRONATE: HCPCS | Mod: HCNC

## 2022-02-14 ENCOUNTER — OFFICE VISIT (OUTPATIENT)
Dept: ENDOCRINOLOGY | Facility: CLINIC | Age: 72
End: 2022-02-14
Payer: MEDICARE

## 2022-02-14 VITALS
TEMPERATURE: 99 F | DIASTOLIC BLOOD PRESSURE: 80 MMHG | BODY MASS INDEX: 24.67 KG/M2 | SYSTOLIC BLOOD PRESSURE: 130 MMHG | WEIGHT: 139.25 LBS | OXYGEN SATURATION: 96 % | HEART RATE: 60 BPM | HEIGHT: 63 IN

## 2022-02-14 DIAGNOSIS — E04.1 THYROID NODULE: ICD-10-CM

## 2022-02-14 DIAGNOSIS — L29.9 ITCHING: ICD-10-CM

## 2022-02-14 DIAGNOSIS — E55.9 HYPOVITAMINOSIS D: ICD-10-CM

## 2022-02-14 DIAGNOSIS — E21.0 PRIMARY HYPERPARATHYROIDISM: Primary | ICD-10-CM

## 2022-02-14 DIAGNOSIS — Z78.0 POSTMENOPAUSAL: ICD-10-CM

## 2022-02-14 DIAGNOSIS — M85.80 OSTEOPENIA, UNSPECIFIED LOCATION: ICD-10-CM

## 2022-02-14 PROCEDURE — 99999 PR PBB SHADOW E&M-EST. PATIENT-LVL IV: ICD-10-PCS | Mod: PBBFAC,HCNC,, | Performed by: PHYSICIAN ASSISTANT

## 2022-02-14 PROCEDURE — 3075F PR MOST RECENT SYSTOLIC BLOOD PRESS GE 130-139MM HG: ICD-10-PCS | Mod: HCNC,CPTII,S$GLB, | Performed by: PHYSICIAN ASSISTANT

## 2022-02-14 PROCEDURE — 1126F AMNT PAIN NOTED NONE PRSNT: CPT | Mod: HCNC,CPTII,S$GLB, | Performed by: PHYSICIAN ASSISTANT

## 2022-02-14 PROCEDURE — 99999 PR PBB SHADOW E&M-EST. PATIENT-LVL IV: CPT | Mod: PBBFAC,HCNC,, | Performed by: PHYSICIAN ASSISTANT

## 2022-02-14 PROCEDURE — 99499 UNLISTED E&M SERVICE: CPT | Mod: S$GLB,,, | Performed by: PHYSICIAN ASSISTANT

## 2022-02-14 PROCEDURE — 3008F PR BODY MASS INDEX (BMI) DOCUMENTED: ICD-10-PCS | Mod: HCNC,CPTII,S$GLB, | Performed by: PHYSICIAN ASSISTANT

## 2022-02-14 PROCEDURE — 99499 RISK ADDL DX/OHS AUDIT: ICD-10-PCS | Mod: S$GLB,,, | Performed by: PHYSICIAN ASSISTANT

## 2022-02-14 PROCEDURE — 99213 PR OFFICE/OUTPT VISIT, EST, LEVL III, 20-29 MIN: ICD-10-PCS | Mod: HCNC,S$GLB,, | Performed by: PHYSICIAN ASSISTANT

## 2022-02-14 PROCEDURE — 3288F FALL RISK ASSESSMENT DOCD: CPT | Mod: HCNC,CPTII,S$GLB, | Performed by: PHYSICIAN ASSISTANT

## 2022-02-14 PROCEDURE — 3288F PR FALLS RISK ASSESSMENT DOCUMENTED: ICD-10-PCS | Mod: HCNC,CPTII,S$GLB, | Performed by: PHYSICIAN ASSISTANT

## 2022-02-14 PROCEDURE — 3079F DIAST BP 80-89 MM HG: CPT | Mod: HCNC,CPTII,S$GLB, | Performed by: PHYSICIAN ASSISTANT

## 2022-02-14 PROCEDURE — 99213 OFFICE O/P EST LOW 20 MIN: CPT | Mod: HCNC,S$GLB,, | Performed by: PHYSICIAN ASSISTANT

## 2022-02-14 PROCEDURE — 1126F PR PAIN SEVERITY QUANTIFIED, NO PAIN PRESENT: ICD-10-PCS | Mod: HCNC,CPTII,S$GLB, | Performed by: PHYSICIAN ASSISTANT

## 2022-02-14 PROCEDURE — 3008F BODY MASS INDEX DOCD: CPT | Mod: HCNC,CPTII,S$GLB, | Performed by: PHYSICIAN ASSISTANT

## 2022-02-14 PROCEDURE — 1159F MED LIST DOCD IN RCRD: CPT | Mod: HCNC,CPTII,S$GLB, | Performed by: PHYSICIAN ASSISTANT

## 2022-02-14 PROCEDURE — 3075F SYST BP GE 130 - 139MM HG: CPT | Mod: HCNC,CPTII,S$GLB, | Performed by: PHYSICIAN ASSISTANT

## 2022-02-14 PROCEDURE — 1101F PR PT FALLS ASSESS DOC 0-1 FALLS W/OUT INJ PAST YR: ICD-10-PCS | Mod: HCNC,CPTII,S$GLB, | Performed by: PHYSICIAN ASSISTANT

## 2022-02-14 PROCEDURE — 3079F PR MOST RECENT DIASTOLIC BLOOD PRESSURE 80-89 MM HG: ICD-10-PCS | Mod: HCNC,CPTII,S$GLB, | Performed by: PHYSICIAN ASSISTANT

## 2022-02-14 PROCEDURE — 1159F PR MEDICATION LIST DOCUMENTED IN MEDICAL RECORD: ICD-10-PCS | Mod: HCNC,CPTII,S$GLB, | Performed by: PHYSICIAN ASSISTANT

## 2022-02-14 PROCEDURE — 1160F PR REVIEW ALL MEDS BY PRESCRIBER/CLIN PHARMACIST DOCUMENTED: ICD-10-PCS | Mod: HCNC,CPTII,S$GLB, | Performed by: PHYSICIAN ASSISTANT

## 2022-02-14 PROCEDURE — 1160F RVW MEDS BY RX/DR IN RCRD: CPT | Mod: HCNC,CPTII,S$GLB, | Performed by: PHYSICIAN ASSISTANT

## 2022-02-14 PROCEDURE — 1101F PT FALLS ASSESS-DOCD LE1/YR: CPT | Mod: HCNC,CPTII,S$GLB, | Performed by: PHYSICIAN ASSISTANT

## 2022-02-14 RX ORDER — ALENDRONATE SODIUM 70 MG/1
TABLET ORAL
Qty: 12 TABLET | Refills: 3 | Status: SHIPPED | OUTPATIENT
Start: 2022-02-14 | End: 2022-12-20 | Stop reason: SDUPTHER

## 2022-02-14 NOTE — PROGRESS NOTES
CC: Hyperparathyroidism/Nodular thyroid disease and osteopenia    HPI: Eva Joseph is a 72 y.o. female here for nodular thyroid disease along with pending conditions listed in the Visit Diagnosis. No fhx of thyroid disease. Her grandson has Type 1 DM. Patient had bilateral breast cancer for which she was treated with bilateral mastectomy. She was -ve for BRCA1 and 2. 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 She had COVID-19 at [place].    Thyroid u/s 8/20 shows 1.7 cm nodule in left lobe that had a benign FNA. No SOB or voice changes. + TPO.     Kidney stones 1970, 1977 w/ pregnancies. She also had one stone in 2005. No n/v, abdominal pain or muscle weakness.     DEXA scan: 8/21 osteopenia. Taking fosamax 70 mg weekly. No fractures, falls or steriod injections. Phx of osteoporosis and took reclast for three years. Not taking calcium carbonate. Taking 2000 IU of vitamin d. She has been playing [employer] ball once weekly and the gym 2x weekly (step class). She also does a stretching class.  No recent kidney stones. No steriod injections.    + fatigue, hair loss, depressed, heat intolerance, mental fogginess. No palpitations or tremors.    Pt is taking atarax for itching on her scalp.    PMHx, PSHx: reviewed in epic.    Social Hx: no ETOH/tobacco use.     Wt Readings from Last 5 Encounters:   02/14/22 63.2 kg (139 lb 3.5 oz)   01/18/22 62.2 kg (137 lb 3.2 oz)   01/11/22 61.7 kg (136 lb)   01/03/22 62.1 kg (136 lb 14.4 oz)   12/30/21 62.6 kg (138 lb)      ROS:   Constitutional: no fatigue, wt gain (5 lbs since 7/20).  Eyes: No recent visual changes  Cardiovascular: + feet sweling, Denies current anginal symptoms  Respiratory: Denies current respiratory difficulty  Gastrointestinal: Denies recent bowel disturbances  GenitoUrinary - No dysuria  Skin: + scalp itching  Neurologic: + numbness and tingling in feet  Endocrine: no polyphagia, polydipsia or polyuria  Psych: no anxiety  "or depression  Remainder ROS negative     /80 (BP Location: Left arm, Patient Position: Sitting, BP Method: Small (Manual))   Pulse 60   Temp 98.7 °F (37.1 °C) (Oral)   Ht 5' 3" (1.6 m)   Wt 63.2 kg (139 lb 3.5 oz)   LMP  (LMP Unknown)   SpO2 96%   BMI 24.66 kg/m²      Personally reviewed labs below:  Lab Results   Component Value Date    TSH 2.773 12/16/2021    R0PYEAO 80 01/22/2020    FREET4 0.96 12/16/2021     Lab Results   Component Value Date    PTH 41.7 12/16/2021    CALCIUM 10.2 12/16/2021    CAION 1.35 12/16/2021    PHOS 3.5 02/09/2021         Chemistry        Component Value Date/Time     12/16/2021 0832    K 4.0 12/16/2021 0832     12/16/2021 0832    CO2 24 12/16/2021 0832    BUN 13 12/16/2021 0832    CREATININE 0.7 12/16/2021 0832    GLU 84 12/16/2021 0832        Component Value Date/Time    CALCIUM 10.2 12/16/2021 0832    ALKPHOS 70 12/16/2021 0832    AST 23 12/16/2021 0832    ALT 20 12/16/2021 0832    BILITOT 0.6 12/16/2021 0832    ESTGFRAFRICA >60.0 12/16/2021 0832    EGFRNONAA >60.0 12/16/2021 0832         No results found for: HGBA1C     PE:  GENERAL: elderly female (looks younger than stated age), well developed, well nourished  NECK: Supple neck, normal thyroid. No bruit  LYMPHATIC: No cervical or supraclavicular lymphadenopathy  CARDIOVASCULAR: Normal heart sounds, no pedal edema  RESPIRATORY: Normal effort, clear to auscultation bl  FEET: appropriate footwear.   NEURO: steady gait, CN ll-Xll grossly intact  SKIN: no rash seen on scalp  Psych: normal mood and affect    Assessment/Plan:   1. Primary hyperparathyroidism  PTH, Intact    Calcium, Ionized    Renal Function Panel   2. Osteopenia, unspecified location     3. Thyroid nodule  TSH    US Soft Tissue Head Neck Thyroid   4. Postmenopausal     5. Hypovitaminosis D     6. Itching       Primary hyperparathyroidism-PTH wnl. Ca normal.  Osteoporosis-continue fosamax, ca and vd. Repeat DEXA 8/23. Continue exercise.  Thyroid " nodule-TSH wnl. Repeat thyroid u/s 1/23.  Postmenopausal-see above  Hypovitaminosis O-ridwll-wqltepps otc vd  Pkrkndcxjrlhrrekpzqx-iziumy-cniljaxq fish oil and ASA.  Itching-continue atarax 10 mg daily.    F/u in one year w/ labs and u/s

## 2022-02-15 ENCOUNTER — OFFICE VISIT (OUTPATIENT)
Dept: FAMILY MEDICINE | Facility: CLINIC | Age: 72
End: 2022-02-15
Payer: MEDICARE

## 2022-02-15 VITALS
TEMPERATURE: 98 F | HEART RATE: 72 BPM | SYSTOLIC BLOOD PRESSURE: 136 MMHG | OXYGEN SATURATION: 98 % | RESPIRATION RATE: 12 BRPM | WEIGHT: 139 LBS | DIASTOLIC BLOOD PRESSURE: 82 MMHG | BODY MASS INDEX: 24.63 KG/M2 | HEIGHT: 63 IN

## 2022-02-15 DIAGNOSIS — R03.0 ELEVATED BLOOD PRESSURE READING: Primary | ICD-10-CM

## 2022-02-15 PROCEDURE — 1159F MED LIST DOCD IN RCRD: CPT | Mod: HCWC,CPTII,S$GLB, | Performed by: FAMILY MEDICINE

## 2022-02-15 PROCEDURE — 3075F PR MOST RECENT SYSTOLIC BLOOD PRESS GE 130-139MM HG: ICD-10-PCS | Mod: HCWC,CPTII,S$GLB, | Performed by: FAMILY MEDICINE

## 2022-02-15 PROCEDURE — 1101F PT FALLS ASSESS-DOCD LE1/YR: CPT | Mod: HCWC,CPTII,S$GLB, | Performed by: FAMILY MEDICINE

## 2022-02-15 PROCEDURE — 3288F FALL RISK ASSESSMENT DOCD: CPT | Mod: HCWC,CPTII,S$GLB, | Performed by: FAMILY MEDICINE

## 2022-02-15 PROCEDURE — 99999 PR PBB SHADOW E&M-EST. PATIENT-LVL V: CPT | Mod: PBBFAC,HCWC,, | Performed by: FAMILY MEDICINE

## 2022-02-15 PROCEDURE — 99213 PR OFFICE/OUTPT VISIT, EST, LEVL III, 20-29 MIN: ICD-10-PCS | Mod: HCWC,S$GLB,, | Performed by: FAMILY MEDICINE

## 2022-02-15 PROCEDURE — 3008F BODY MASS INDEX DOCD: CPT | Mod: HCWC,CPTII,S$GLB, | Performed by: FAMILY MEDICINE

## 2022-02-15 PROCEDURE — 99999 PR PBB SHADOW E&M-EST. PATIENT-LVL V: ICD-10-PCS | Mod: PBBFAC,HCWC,, | Performed by: FAMILY MEDICINE

## 2022-02-15 PROCEDURE — 3288F PR FALLS RISK ASSESSMENT DOCUMENTED: ICD-10-PCS | Mod: HCWC,CPTII,S$GLB, | Performed by: FAMILY MEDICINE

## 2022-02-15 PROCEDURE — 99213 OFFICE O/P EST LOW 20 MIN: CPT | Mod: HCWC,S$GLB,, | Performed by: FAMILY MEDICINE

## 2022-02-15 PROCEDURE — 1160F PR REVIEW ALL MEDS BY PRESCRIBER/CLIN PHARMACIST DOCUMENTED: ICD-10-PCS | Mod: HCWC,CPTII,S$GLB, | Performed by: FAMILY MEDICINE

## 2022-02-15 PROCEDURE — 3008F PR BODY MASS INDEX (BMI) DOCUMENTED: ICD-10-PCS | Mod: HCWC,CPTII,S$GLB, | Performed by: FAMILY MEDICINE

## 2022-02-15 PROCEDURE — 1101F PR PT FALLS ASSESS DOC 0-1 FALLS W/OUT INJ PAST YR: ICD-10-PCS | Mod: HCWC,CPTII,S$GLB, | Performed by: FAMILY MEDICINE

## 2022-02-15 PROCEDURE — 3079F PR MOST RECENT DIASTOLIC BLOOD PRESSURE 80-89 MM HG: ICD-10-PCS | Mod: HCWC,CPTII,S$GLB, | Performed by: FAMILY MEDICINE

## 2022-02-15 PROCEDURE — 1159F PR MEDICATION LIST DOCUMENTED IN MEDICAL RECORD: ICD-10-PCS | Mod: HCWC,CPTII,S$GLB, | Performed by: FAMILY MEDICINE

## 2022-02-15 PROCEDURE — 1126F AMNT PAIN NOTED NONE PRSNT: CPT | Mod: HCWC,CPTII,S$GLB, | Performed by: FAMILY MEDICINE

## 2022-02-15 PROCEDURE — 3079F DIAST BP 80-89 MM HG: CPT | Mod: HCWC,CPTII,S$GLB, | Performed by: FAMILY MEDICINE

## 2022-02-15 PROCEDURE — 3075F SYST BP GE 130 - 139MM HG: CPT | Mod: HCWC,CPTII,S$GLB, | Performed by: FAMILY MEDICINE

## 2022-02-15 PROCEDURE — 1160F RVW MEDS BY RX/DR IN RCRD: CPT | Mod: HCWC,CPTII,S$GLB, | Performed by: FAMILY MEDICINE

## 2022-02-15 PROCEDURE — 1126F PR PAIN SEVERITY QUANTIFIED, NO PAIN PRESENT: ICD-10-PCS | Mod: HCWC,CPTII,S$GLB, | Performed by: FAMILY MEDICINE

## 2022-02-15 RX ORDER — HYDRALAZINE HYDROCHLORIDE 10 MG/1
10 TABLET, FILM COATED ORAL 3 TIMES DAILY PRN
Qty: 20 TABLET | Refills: 1 | Status: SHIPPED | OUTPATIENT
Start: 2022-02-15 | End: 2022-03-25 | Stop reason: SDUPTHER

## 2022-02-15 NOTE — PROGRESS NOTES
[facility] - Clinic Note    Subjective      Ms. Joseph is a 72 y.o. female who presents to clinic for Follow-up    Blood pressure is fluctuating but improved.  Has taken medication 4 times.    PM Eva has a past medical history of Breast cancer (2000), Depression, Hypertriglyceridemia (7/21/2019), Nephrolithiasis (2/5/2015), and Osteopenia.   PSXH Eva has a past surgical history that includes Breast lumpectomy (2000); Hysterectomy (1998); Bilateral oophorectomy; Femoral hernia (2010); Mastectomy (2013 ); Tonsillectomy; Adenoidectomy; Hemorrhoid surgery; Breast reconstruction (Bilateral, 2013); and Oophorectomy.    Eva's family history includes Breast cancer in her maternal grandmother and mother; Cancer in her maternal aunt and maternal grandfather; Heart disease (age of onset: 56) in her father; Hyperlipidemia in her son; Hypertension in her father; No Known Problems in her brother, daughter, daughter, sister, and sister; Ovarian cancer in her maternal aunt; Parkinsonism in her paternal grandfather; Skin cancer in her brother; Stroke in her paternal grandmother.    Eva reports that she has never smoked. She has never used smokeless tobacco. She reports current alcohol use of about 1.0 standard drink of alcohol per week. She reports that she does not use drugs.   BILLY Velasquez is allergic to sulfa (sulfonamide antibiotics).   AMADOR Velasquez has a current medication list which includes the following prescription(s): alendronate, amino acids-minerals, ascorbic acid (vitamin c), aspirin, biotin, coenzyme q10-vitamin e, fish oil-dha-epa, glucosamine-chondroitin, hydrocortisone, hydroxyzine hcl, minoxidil, multivitamin, rosuvastatin, vitamin d3-vitamin k2, and hydralazine, and the following Facility-Administered Medications: acetaminophen, albuterol, diphenhydramine, epinephrine, methylprednisolone sodium succinate, ondansetron, sodium chloride 0.9% 500 mL flush bag, and sodium chloride 0.9%.  "    Review of Systems   Constitutional: Negative for chills and fever.   Respiratory: Negative for cough and shortness of breath.    Cardiovascular: Negative for chest pain.   Neurological: Negative for headaches.     Objective     /82 (BP Location: Left arm, Patient Position: Sitting, BP Method: Medium (Manual))   Pulse 72   Temp 98.1 °F (36.7 °C) (Temporal)   Resp 12   Ht 5' 3" (1.6 m)   Wt 63 kg (139 lb)   LMP  (LMP Unknown)   SpO2 98%   BMI 24.62 kg/m²     Physical Exam  Vitals and nursing note reviewed.   Constitutional:       General: She is not in acute distress.     Appearance: Normal appearance. She is well-developed. She is not diaphoretic.   HENT:      Head: Normocephalic and atraumatic.      Right Ear: External ear normal.      Left Ear: External ear normal.   Eyes:      General:         Right eye: No discharge.         Left eye: No discharge.   Cardiovascular:      Rate and Rhythm: Normal rate and regular rhythm.      Heart sounds: Normal heart sounds.   Pulmonary:      Effort: Pulmonary effort is normal.      Breath sounds: Normal breath sounds. No wheezing or rales.   Skin:     General: Skin is warm and dry.   Neurological:      Mental Status: She is alert and oriented to person, place, and time. Mental status is at baseline.   Psychiatric:         Mood and Affect: Mood normal.         Behavior: Behavior normal.         Thought Content: Thought content normal.         Judgment: Judgment normal.        Assessment/Plan     1. Elevated blood pressure reading  hydrALAZINE (APRESOLINE) 10 MG tablet     Continue medication as prescribed.  Continue monitoring blood pressure.  Follow-up in 3 months.    Future Appointments   Date Time Provider Department Center   5/18/2022  9:20 AM Micha Marshall MD [facility]   10/11/2022 11:00 AM [facility], LABORATORY [facility] LAB [facility]   10/11/2022 11:30 AM [facility] US1 [facility] US [facility]   10/17/2022  2:00 PM LEYLA Ghotra PA-C [facility] " Adithya Marshall MD  Family Medicine  [facility]

## 2022-03-25 ENCOUNTER — PATIENT MESSAGE (OUTPATIENT)
Dept: FAMILY MEDICINE | Facility: CLINIC | Age: 72
End: 2022-03-25
Payer: MEDICARE

## 2022-03-25 DIAGNOSIS — R03.0 ELEVATED BLOOD PRESSURE READING: ICD-10-CM

## 2022-03-25 RX ORDER — HYDRALAZINE HYDROCHLORIDE 10 MG/1
10 TABLET, FILM COATED ORAL 3 TIMES DAILY PRN
Qty: 20 TABLET | Refills: 1 | Status: SHIPPED | OUTPATIENT
Start: 2022-03-25 | End: 2022-10-17

## 2022-03-30 ENCOUNTER — OFFICE VISIT (OUTPATIENT)
Dept: FAMILY MEDICINE | Facility: CLINIC | Age: 72
End: 2022-03-30
Payer: MEDICARE

## 2022-03-30 VITALS
SYSTOLIC BLOOD PRESSURE: 128 MMHG | TEMPERATURE: 98 F | HEART RATE: 62 BPM | OXYGEN SATURATION: 98 % | RESPIRATION RATE: 14 BRPM | HEIGHT: 63 IN | WEIGHT: 138.81 LBS | DIASTOLIC BLOOD PRESSURE: 76 MMHG | BODY MASS INDEX: 24.59 KG/M2

## 2022-03-30 DIAGNOSIS — I10 ESSENTIAL HYPERTENSION: Primary | ICD-10-CM

## 2022-03-30 PROCEDURE — 3074F PR MOST RECENT SYSTOLIC BLOOD PRESSURE < 130 MM HG: ICD-10-PCS | Mod: CPTII,S$GLB,, | Performed by: FAMILY MEDICINE

## 2022-03-30 PROCEDURE — 4010F PR ACE/ARB THEARPY RXD/TAKEN: ICD-10-PCS | Mod: CPTII,S$GLB,, | Performed by: FAMILY MEDICINE

## 2022-03-30 PROCEDURE — 1126F AMNT PAIN NOTED NONE PRSNT: CPT | Mod: CPTII,S$GLB,, | Performed by: FAMILY MEDICINE

## 2022-03-30 PROCEDURE — 99214 PR OFFICE/OUTPT VISIT, EST, LEVL IV, 30-39 MIN: ICD-10-PCS | Mod: S$GLB,,, | Performed by: FAMILY MEDICINE

## 2022-03-30 PROCEDURE — 1101F PT FALLS ASSESS-DOCD LE1/YR: CPT | Mod: CPTII,S$GLB,, | Performed by: FAMILY MEDICINE

## 2022-03-30 PROCEDURE — 99999 PR PBB SHADOW E&M-EST. PATIENT-LVL V: ICD-10-PCS | Mod: PBBFAC,,, | Performed by: FAMILY MEDICINE

## 2022-03-30 PROCEDURE — 3078F DIAST BP <80 MM HG: CPT | Mod: CPTII,S$GLB,, | Performed by: FAMILY MEDICINE

## 2022-03-30 PROCEDURE — 3074F SYST BP LT 130 MM HG: CPT | Mod: CPTII,S$GLB,, | Performed by: FAMILY MEDICINE

## 2022-03-30 PROCEDURE — 99999 PR PBB SHADOW E&M-EST. PATIENT-LVL V: CPT | Mod: PBBFAC,,, | Performed by: FAMILY MEDICINE

## 2022-03-30 PROCEDURE — 3288F PR FALLS RISK ASSESSMENT DOCUMENTED: ICD-10-PCS | Mod: CPTII,S$GLB,, | Performed by: FAMILY MEDICINE

## 2022-03-30 PROCEDURE — 1159F PR MEDICATION LIST DOCUMENTED IN MEDICAL RECORD: ICD-10-PCS | Mod: CPTII,S$GLB,, | Performed by: FAMILY MEDICINE

## 2022-03-30 PROCEDURE — 3288F FALL RISK ASSESSMENT DOCD: CPT | Mod: CPTII,S$GLB,, | Performed by: FAMILY MEDICINE

## 2022-03-30 PROCEDURE — 3008F PR BODY MASS INDEX (BMI) DOCUMENTED: ICD-10-PCS | Mod: CPTII,S$GLB,, | Performed by: FAMILY MEDICINE

## 2022-03-30 PROCEDURE — 3008F BODY MASS INDEX DOCD: CPT | Mod: CPTII,S$GLB,, | Performed by: FAMILY MEDICINE

## 2022-03-30 PROCEDURE — 4010F ACE/ARB THERAPY RXD/TAKEN: CPT | Mod: CPTII,S$GLB,, | Performed by: FAMILY MEDICINE

## 2022-03-30 PROCEDURE — 1101F PR PT FALLS ASSESS DOC 0-1 FALLS W/OUT INJ PAST YR: ICD-10-PCS | Mod: CPTII,S$GLB,, | Performed by: FAMILY MEDICINE

## 2022-03-30 PROCEDURE — 3078F PR MOST RECENT DIASTOLIC BLOOD PRESSURE < 80 MM HG: ICD-10-PCS | Mod: CPTII,S$GLB,, | Performed by: FAMILY MEDICINE

## 2022-03-30 PROCEDURE — 1159F MED LIST DOCD IN RCRD: CPT | Mod: CPTII,S$GLB,, | Performed by: FAMILY MEDICINE

## 2022-03-30 PROCEDURE — 1160F RVW MEDS BY RX/DR IN RCRD: CPT | Mod: CPTII,S$GLB,, | Performed by: FAMILY MEDICINE

## 2022-03-30 PROCEDURE — 99214 OFFICE O/P EST MOD 30 MIN: CPT | Mod: S$GLB,,, | Performed by: FAMILY MEDICINE

## 2022-03-30 PROCEDURE — 1160F PR REVIEW ALL MEDS BY PRESCRIBER/CLIN PHARMACIST DOCUMENTED: ICD-10-PCS | Mod: CPTII,S$GLB,, | Performed by: FAMILY MEDICINE

## 2022-03-30 PROCEDURE — 1126F PR PAIN SEVERITY QUANTIFIED, NO PAIN PRESENT: ICD-10-PCS | Mod: CPTII,S$GLB,, | Performed by: FAMILY MEDICINE

## 2022-03-30 RX ORDER — LOSARTAN POTASSIUM 25 MG/1
25 TABLET ORAL DAILY
Qty: 30 TABLET | Refills: 5 | Status: SHIPPED | OUTPATIENT
Start: 2022-03-30 | End: 2022-06-21 | Stop reason: SDUPTHER

## 2022-03-30 NOTE — PROGRESS NOTES
[facility] - Clinic Note    Subjective      Ms. Joseph is a 72 y.o. female who presents to clinic for Follow-up    Blood pressure has been elevated.  Patient has had to take hydralazine several times.  Having a slight headache at times.    [facility] Eva has a past medical history of Breast cancer (2000), Depression, Hypertriglyceridemia (7/21/2019), Nephrolithiasis (2/5/2015), and Osteopenia.   PSX Eva has a past surgical history that includes Breast lumpectomy (2000); Hysterectomy (1998); Bilateral oophorectomy; Femoral hernia (2010); Mastectomy (2013 ); Tonsillectomy; Adenoidectomy; Hemorrhoid surgery; Breast reconstruction (Bilateral, 2013); and Oophorectomy.    Eva's family history includes Breast cancer in her maternal grandmother and mother; Cancer in her maternal aunt and maternal grandfather; Heart disease (age of onset: 56) in her father; Hyperlipidemia in her son; Hypertension in her father; No Known Problems in her brother, daughter, daughter, sister, and sister; Ovarian cancer in her maternal aunt; Parkinsonism in her paternal grandfather; Skin cancer in her brother; Stroke in her paternal grandmother.    Eva reports that she has never smoked. She has never used smokeless tobacco. She reports current alcohol use of about 1.0 standard drink of alcohol per week. She reports that she does not use drugs.   BILLY Velasquez is allergic to sulfa (sulfonamide antibiotics).   AMADOR Velasquez has a current medication list which includes the following prescription(s): alendronate, amino acids-minerals, ascorbic acid (vitamin c), aspirin, biotin, coenzyme q10-vitamin e, fish oil-dha-epa, glucosamine-chondroitin, hydralazine, minoxidil, multivitamin, rosuvastatin, vitamin d3-vitamin k2, hydrocortisone, and losartan, and the following Facility-Administered Medications: acetaminophen, albuterol, diphenhydramine, epinephrine, methylprednisolone sodium succinate, ondansetron, sodium chloride 0.9% 500  "mL flush bag, and sodium chloride 0.9%.     Review of Systems   Constitutional: Negative for chills and fever.   Respiratory: Negative for shortness of breath.    Cardiovascular: Negative for chest pain.   Neurological: Positive for headaches.     Objective     /76 (BP Location: Left arm, Patient Position: Sitting, BP Method: Large (Manual))   Pulse 62   Temp 97.8 °F (36.6 °C) (Temporal)   Resp 14   Ht 5' 3" (1.6 m)   Wt 63 kg (138 lb 12.8 oz)   LMP  (LMP Unknown)   SpO2 98%   BMI 24.59 kg/m²     Physical Exam  Vitals and nursing note reviewed.   Constitutional:       General: She is not in acute distress.     Appearance: Normal appearance. She is well-developed. She is not diaphoretic.   HENT:      Head: Normocephalic and atraumatic.      Right Ear: External ear normal.      Left Ear: External ear normal.   Eyes:      General:         Right eye: No discharge.         Left eye: No discharge.   Cardiovascular:      Rate and Rhythm: Normal rate and regular rhythm.      Heart sounds: Normal heart sounds.   Pulmonary:      Effort: Pulmonary effort is normal.      Breath sounds: Normal breath sounds. No wheezing or rales.   Skin:     General: Skin is warm and dry.   Neurological:      Mental Status: She is alert and oriented to person, place, and time. Mental status is at baseline.   Psychiatric:         Mood and Affect: Mood normal.         Behavior: Behavior normal.         Thought Content: Thought content normal.         Judgment: Judgment normal.        Assessment/Plan     1. Essential hypertension  losartan (COZAAR) 25 MG tablet     Start losartan 25 mg daily.  Monitor blood pressure.  Follow-up in 1 month.    Future Appointments   Date Time Provider Department Center   5/18/2022  9:20 AM Micha Marshall MD [facility]   5/31/2022  1:00 PM Merlene Lui MD [facility] HEMONC2 [facility]   10/11/2022 11:00 AM [facility], LABORATORY [facility] LAB [facility]   10/11/2022 11:30 AM [facility] US1 [facility] US " [facility]   10/17/2022  2:00 PM LEYLA Ghotra PA-C [facility] [place]         Micha Marshall MD  Family Medicine  [facility]

## 2022-05-18 ENCOUNTER — OFFICE VISIT (OUTPATIENT)
Dept: FAMILY MEDICINE | Facility: CLINIC | Age: 72
End: 2022-05-18
Payer: MEDICARE

## 2022-05-18 VITALS
WEIGHT: 138.38 LBS | HEIGHT: 63 IN | DIASTOLIC BLOOD PRESSURE: 76 MMHG | HEART RATE: 55 BPM | RESPIRATION RATE: 14 BRPM | SYSTOLIC BLOOD PRESSURE: 136 MMHG | TEMPERATURE: 98 F | BODY MASS INDEX: 24.52 KG/M2 | OXYGEN SATURATION: 98 %

## 2022-05-18 DIAGNOSIS — H57.9 ITCHY EYES: ICD-10-CM

## 2022-05-18 DIAGNOSIS — I10 ESSENTIAL HYPERTENSION: Primary | ICD-10-CM

## 2022-05-18 PROCEDURE — 4010F ACE/ARB THERAPY RXD/TAKEN: CPT | Mod: CPTII,S$GLB,, | Performed by: FAMILY MEDICINE

## 2022-05-18 PROCEDURE — 99214 PR OFFICE/OUTPT VISIT, EST, LEVL IV, 30-39 MIN: ICD-10-PCS | Mod: S$GLB,,, | Performed by: FAMILY MEDICINE

## 2022-05-18 PROCEDURE — 99999 PR PBB SHADOW E&M-EST. PATIENT-LVL V: ICD-10-PCS | Mod: PBBFAC,,, | Performed by: FAMILY MEDICINE

## 2022-05-18 PROCEDURE — 1101F PT FALLS ASSESS-DOCD LE1/YR: CPT | Mod: CPTII,S$GLB,, | Performed by: FAMILY MEDICINE

## 2022-05-18 PROCEDURE — 3008F BODY MASS INDEX DOCD: CPT | Mod: CPTII,S$GLB,, | Performed by: FAMILY MEDICINE

## 2022-05-18 PROCEDURE — 1126F AMNT PAIN NOTED NONE PRSNT: CPT | Mod: CPTII,S$GLB,, | Performed by: FAMILY MEDICINE

## 2022-05-18 PROCEDURE — 99214 OFFICE O/P EST MOD 30 MIN: CPT | Mod: S$GLB,,, | Performed by: FAMILY MEDICINE

## 2022-05-18 PROCEDURE — 3288F FALL RISK ASSESSMENT DOCD: CPT | Mod: CPTII,S$GLB,, | Performed by: FAMILY MEDICINE

## 2022-05-18 PROCEDURE — 3288F PR FALLS RISK ASSESSMENT DOCUMENTED: ICD-10-PCS | Mod: CPTII,S$GLB,, | Performed by: FAMILY MEDICINE

## 2022-05-18 PROCEDURE — 3075F PR MOST RECENT SYSTOLIC BLOOD PRESS GE 130-139MM HG: ICD-10-PCS | Mod: CPTII,S$GLB,, | Performed by: FAMILY MEDICINE

## 2022-05-18 PROCEDURE — 3078F PR MOST RECENT DIASTOLIC BLOOD PRESSURE < 80 MM HG: ICD-10-PCS | Mod: CPTII,S$GLB,, | Performed by: FAMILY MEDICINE

## 2022-05-18 PROCEDURE — 1126F PR PAIN SEVERITY QUANTIFIED, NO PAIN PRESENT: ICD-10-PCS | Mod: CPTII,S$GLB,, | Performed by: FAMILY MEDICINE

## 2022-05-18 PROCEDURE — 4010F PR ACE/ARB THEARPY RXD/TAKEN: ICD-10-PCS | Mod: CPTII,S$GLB,, | Performed by: FAMILY MEDICINE

## 2022-05-18 PROCEDURE — 99999 PR PBB SHADOW E&M-EST. PATIENT-LVL V: CPT | Mod: PBBFAC,,, | Performed by: FAMILY MEDICINE

## 2022-05-18 PROCEDURE — 1159F PR MEDICATION LIST DOCUMENTED IN MEDICAL RECORD: ICD-10-PCS | Mod: CPTII,S$GLB,, | Performed by: FAMILY MEDICINE

## 2022-05-18 PROCEDURE — 1159F MED LIST DOCD IN RCRD: CPT | Mod: CPTII,S$GLB,, | Performed by: FAMILY MEDICINE

## 2022-05-18 PROCEDURE — 1101F PR PT FALLS ASSESS DOC 0-1 FALLS W/OUT INJ PAST YR: ICD-10-PCS | Mod: CPTII,S$GLB,, | Performed by: FAMILY MEDICINE

## 2022-05-18 PROCEDURE — 1160F RVW MEDS BY RX/DR IN RCRD: CPT | Mod: CPTII,S$GLB,, | Performed by: FAMILY MEDICINE

## 2022-05-18 PROCEDURE — 3075F SYST BP GE 130 - 139MM HG: CPT | Mod: CPTII,S$GLB,, | Performed by: FAMILY MEDICINE

## 2022-05-18 PROCEDURE — 3078F DIAST BP <80 MM HG: CPT | Mod: CPTII,S$GLB,, | Performed by: FAMILY MEDICINE

## 2022-05-18 PROCEDURE — 1160F PR REVIEW ALL MEDS BY PRESCRIBER/CLIN PHARMACIST DOCUMENTED: ICD-10-PCS | Mod: CPTII,S$GLB,, | Performed by: FAMILY MEDICINE

## 2022-05-18 PROCEDURE — 3008F PR BODY MASS INDEX (BMI) DOCUMENTED: ICD-10-PCS | Mod: CPTII,S$GLB,, | Performed by: FAMILY MEDICINE

## 2022-05-18 RX ORDER — KETOTIFEN FUMARATE 0.35 MG/ML
1 SOLUTION/ DROPS OPHTHALMIC 2 TIMES DAILY
Qty: 5 ML | Refills: 0 | Status: SHIPPED | OUTPATIENT
Start: 2022-05-18 | End: 2022-09-13

## 2022-05-18 NOTE — PROGRESS NOTES
[facility] - Clinic Note    Subjective      Ms. Joseph is a 72 y.o. female who presents to clinic for Follow-up (3mth follow up) and Headache    Blood pressure has been improved since starting the losartan about a month ago.  Having a slight headache at times.    [facility] Eva has a past medical history of Breast cancer (2000), Depression, Essential hypertension (5/18/2022), Hypertriglyceridemia (7/21/2019), Nephrolithiasis (2/5/2015), and Osteopenia.   PSX Eva has a past surgical history that includes Breast lumpectomy (2000); Hysterectomy (1998); Bilateral oophorectomy; Femoral hernia (2010); Mastectomy (2013 ); Tonsillectomy; Adenoidectomy; Hemorrhoid surgery; Breast reconstruction (Bilateral, 2013); and Oophorectomy.    Eva's family history includes Breast cancer in her maternal grandmother and mother; Cancer in her maternal aunt and maternal grandfather; Heart disease (age of onset: 56) in her father; Hyperlipidemia in her son; Hypertension in her father; No Known Problems in her brother, daughter, daughter, sister, and sister; Ovarian cancer in her maternal aunt; Parkinsonism in her paternal grandfather; Skin cancer in her brother; Stroke in her paternal grandmother.    Eva reports that she has never smoked. She has never used smokeless tobacco. She reports current alcohol use of about 1.0 standard drink of alcohol per week. She reports that she does not use drugs.   BILLY Velasquez is allergic to sulfa (sulfonamide antibiotics).   MED Eva has a current medication list which includes the following prescription(s): alendronate, amino acids-minerals, ascorbic acid (vitamin c), biotin, coenzyme q10-vitamin e, fish oil-dha-epa, glucosamine-chondroitin, losartan, minoxidil, multivitamin, rosuvastatin, vitamin d3-vitamin k2, hydralazine, and ketotifen, and the following Facility-Administered Medications: acetaminophen, albuterol, diphenhydramine, epinephrine, methylprednisolone sodium  "succinate, ondansetron, sodium chloride 0.9% 500 mL flush bag, and sodium chloride 0.9%.     Review of Systems   Constitutional: Negative for chills and fever.   Respiratory: Negative for shortness of breath.    Cardiovascular: Negative for chest pain.   Neurological: Positive for headaches.     Objective     /76 (BP Location: Left arm, Patient Position: Sitting, BP Method: Large (Manual))   Pulse (!) 55   Temp 97.9 °F (36.6 °C) (Temporal)   Resp 14   Ht 5' 3" (1.6 m)   Wt 62.8 kg (138 lb 6.4 oz)   LMP  (LMP Unknown)   SpO2 98%   BMI 24.52 kg/m²     Physical Exam  Vitals and nursing note reviewed.   Constitutional:       General: She is not in acute distress.     Appearance: Normal appearance. She is well-developed. She is not diaphoretic.   HENT:      Head: Normocephalic and atraumatic.      Right Ear: External ear normal.      Left Ear: External ear normal.   Eyes:      General:         Right eye: No discharge.         Left eye: No discharge.   Cardiovascular:      Rate and Rhythm: Normal rate and regular rhythm.      Heart sounds: Normal heart sounds.   Pulmonary:      Effort: Pulmonary effort is normal.      Breath sounds: Normal breath sounds. No wheezing or rales.   Skin:     General: Skin is warm and dry.   Neurological:      Mental Status: She is alert and oriented to person, place, and time. Mental status is at baseline.   Psychiatric:         Mood and Affect: Mood normal.         Behavior: Behavior normal.         Thought Content: Thought content normal.         Judgment: Judgment normal.        Assessment/Plan     1. Essential hypertension     2. Itchy eyes  ketotifen (ZADITOR) 0.025 % (0.035 %) ophthalmic solution     Continue losartan 25 mg daily.  Monitor blood pressure and headaches.  Ketotifen eyedrops prescribed.  Follow-up in 3-6 month.    Future Appointments   Date Time Provider Department Center   5/31/2022  1:00 PM Merlene Lui MD [facility] HEMONC2 [facility]   10/11/2022 11:00 AM " [facility], LABORATORY [facility] LAB [facility]   10/11/2022 11:30 AM [facility] US1 [facility] US [facility]   10/17/2022  2:00 PM LEYLA Ghotra PA-C [facility] [place]         Micha Masrhall MD  Family Medicine  [facility]

## 2022-05-26 ENCOUNTER — TELEPHONE (OUTPATIENT)
Dept: HEMATOLOGY/ONCOLOGY | Facility: CLINIC | Age: 72
End: 2022-05-26
Payer: MEDICARE

## 2022-06-21 ENCOUNTER — PATIENT MESSAGE (OUTPATIENT)
Dept: FAMILY MEDICINE | Facility: CLINIC | Age: 72
End: 2022-06-21
Payer: MEDICARE

## 2022-06-21 DIAGNOSIS — I10 ESSENTIAL HYPERTENSION: ICD-10-CM

## 2022-06-21 RX ORDER — LOSARTAN POTASSIUM 25 MG/1
25 TABLET ORAL DAILY
Qty: 90 TABLET | Refills: 3 | Status: SHIPPED | OUTPATIENT
Start: 2022-06-21 | End: 2022-07-20 | Stop reason: SDUPTHER

## 2022-07-01 ENCOUNTER — PATIENT MESSAGE (OUTPATIENT)
Dept: FAMILY MEDICINE | Facility: CLINIC | Age: 72
End: 2022-07-01
Payer: MEDICARE

## 2022-07-20 ENCOUNTER — OFFICE VISIT (OUTPATIENT)
Dept: FAMILY MEDICINE | Facility: CLINIC | Age: 72
End: 2022-07-20
Payer: MEDICARE

## 2022-07-20 VITALS
RESPIRATION RATE: 16 BRPM | HEIGHT: 63 IN | OXYGEN SATURATION: 97 % | WEIGHT: 137.38 LBS | SYSTOLIC BLOOD PRESSURE: 130 MMHG | DIASTOLIC BLOOD PRESSURE: 72 MMHG | HEART RATE: 48 BPM | BODY MASS INDEX: 24.34 KG/M2 | TEMPERATURE: 98 F

## 2022-07-20 DIAGNOSIS — I10 ESSENTIAL HYPERTENSION: ICD-10-CM

## 2022-07-20 PROCEDURE — 3075F PR MOST RECENT SYSTOLIC BLOOD PRESS GE 130-139MM HG: ICD-10-PCS | Mod: CPTII,S$GLB,, | Performed by: FAMILY MEDICINE

## 2022-07-20 PROCEDURE — 3078F DIAST BP <80 MM HG: CPT | Mod: CPTII,S$GLB,, | Performed by: FAMILY MEDICINE

## 2022-07-20 PROCEDURE — 4010F PR ACE/ARB THEARPY RXD/TAKEN: ICD-10-PCS | Mod: CPTII,S$GLB,, | Performed by: FAMILY MEDICINE

## 2022-07-20 PROCEDURE — 99999 PR PBB SHADOW E&M-EST. PATIENT-LVL IV: CPT | Mod: PBBFAC,,, | Performed by: FAMILY MEDICINE

## 2022-07-20 PROCEDURE — 3075F SYST BP GE 130 - 139MM HG: CPT | Mod: CPTII,S$GLB,, | Performed by: FAMILY MEDICINE

## 2022-07-20 PROCEDURE — 99214 OFFICE O/P EST MOD 30 MIN: CPT | Mod: S$GLB,,, | Performed by: FAMILY MEDICINE

## 2022-07-20 PROCEDURE — 1126F AMNT PAIN NOTED NONE PRSNT: CPT | Mod: CPTII,S$GLB,, | Performed by: FAMILY MEDICINE

## 2022-07-20 PROCEDURE — 1101F PT FALLS ASSESS-DOCD LE1/YR: CPT | Mod: CPTII,S$GLB,, | Performed by: FAMILY MEDICINE

## 2022-07-20 PROCEDURE — 1160F PR REVIEW ALL MEDS BY PRESCRIBER/CLIN PHARMACIST DOCUMENTED: ICD-10-PCS | Mod: CPTII,S$GLB,, | Performed by: FAMILY MEDICINE

## 2022-07-20 PROCEDURE — 3288F PR FALLS RISK ASSESSMENT DOCUMENTED: ICD-10-PCS | Mod: CPTII,S$GLB,, | Performed by: FAMILY MEDICINE

## 2022-07-20 PROCEDURE — 3078F PR MOST RECENT DIASTOLIC BLOOD PRESSURE < 80 MM HG: ICD-10-PCS | Mod: CPTII,S$GLB,, | Performed by: FAMILY MEDICINE

## 2022-07-20 PROCEDURE — 3288F FALL RISK ASSESSMENT DOCD: CPT | Mod: CPTII,S$GLB,, | Performed by: FAMILY MEDICINE

## 2022-07-20 PROCEDURE — 99214 PR OFFICE/OUTPT VISIT, EST, LEVL IV, 30-39 MIN: ICD-10-PCS | Mod: S$GLB,,, | Performed by: FAMILY MEDICINE

## 2022-07-20 PROCEDURE — 1126F PR PAIN SEVERITY QUANTIFIED, NO PAIN PRESENT: ICD-10-PCS | Mod: CPTII,S$GLB,, | Performed by: FAMILY MEDICINE

## 2022-07-20 PROCEDURE — 3008F BODY MASS INDEX DOCD: CPT | Mod: CPTII,S$GLB,, | Performed by: FAMILY MEDICINE

## 2022-07-20 PROCEDURE — 99999 PR PBB SHADOW E&M-EST. PATIENT-LVL IV: ICD-10-PCS | Mod: PBBFAC,,, | Performed by: FAMILY MEDICINE

## 2022-07-20 PROCEDURE — 1159F PR MEDICATION LIST DOCUMENTED IN MEDICAL RECORD: ICD-10-PCS | Mod: CPTII,S$GLB,, | Performed by: FAMILY MEDICINE

## 2022-07-20 PROCEDURE — 4010F ACE/ARB THERAPY RXD/TAKEN: CPT | Mod: CPTII,S$GLB,, | Performed by: FAMILY MEDICINE

## 2022-07-20 PROCEDURE — 1159F MED LIST DOCD IN RCRD: CPT | Mod: CPTII,S$GLB,, | Performed by: FAMILY MEDICINE

## 2022-07-20 PROCEDURE — 3008F PR BODY MASS INDEX (BMI) DOCUMENTED: ICD-10-PCS | Mod: CPTII,S$GLB,, | Performed by: FAMILY MEDICINE

## 2022-07-20 PROCEDURE — 1160F RVW MEDS BY RX/DR IN RCRD: CPT | Mod: CPTII,S$GLB,, | Performed by: FAMILY MEDICINE

## 2022-07-20 PROCEDURE — 1101F PR PT FALLS ASSESS DOC 0-1 FALLS W/OUT INJ PAST YR: ICD-10-PCS | Mod: CPTII,S$GLB,, | Performed by: FAMILY MEDICINE

## 2022-07-20 RX ORDER — LOSARTAN POTASSIUM 50 MG/1
50 TABLET ORAL 2 TIMES DAILY
Qty: 180 TABLET | Refills: 3 | Status: SHIPPED | OUTPATIENT
Start: 2022-07-20 | End: 2022-10-27

## 2022-07-21 NOTE — PROGRESS NOTES
[facility] - Clinic Note    Subjective      Ms. Joseph is a 72 y.o. female who presents to clinic for Hypertension and Headache    Blood pressure has been improved since starting the losartan about a month ago but is still elevated at home.  Having a slight headache at times.    [facility] Eva has a past medical history of Breast cancer (2000), Depression, Essential hypertension (5/18/2022), Hypertriglyceridemia (7/21/2019), Nephrolithiasis (2/5/2015), and Osteopenia.   PS Eva has a past surgical history that includes Breast lumpectomy (2000); Hysterectomy (1998); Bilateral oophorectomy; Femoral hernia (2010); Mastectomy (2013 ); Tonsillectomy; Adenoidectomy; Hemorrhoid surgery; Breast reconstruction (Bilateral, 2013); and Oophorectomy.    Eva's family history includes Breast cancer in her maternal grandmother and mother; Cancer in her maternal aunt and maternal grandfather; Heart disease (age of onset: 56) in her father; Hyperlipidemia in her son; Hypertension in her father; No Known Problems in her brother, daughter, daughter, sister, and sister; Ovarian cancer in her maternal aunt; Parkinsonism in her paternal grandfather; Skin cancer in her brother; Stroke in her paternal grandmother.    Eva reports that she has never smoked. She has never used smokeless tobacco. She reports current alcohol use of about 1.0 standard drink of alcohol per week. She reports that she does not use drugs.   BILLY Velasquez is allergic to sulfa (sulfonamide antibiotics).   MED Eva has a current medication list which includes the following prescription(s): alendronate, amino acids-minerals, ascorbic acid (vitamin c), biotin, coenzyme q10-vitamin e, fish oil-dha-epa, glucosamine-chondroitin, ketotifen, minoxidil, multivitamin, rosuvastatin, hydralazine, losartan, and vitamin d3-vitamin k2.     Review of Systems   Constitutional: Negative for chills and fever.   Respiratory: Negative for shortness of breath.   "  Cardiovascular: Negative for chest pain.   Neurological: Positive for headaches.     Objective     /72 (BP Location: Left arm, Patient Position: Sitting, BP Method: Large (Manual))   Pulse (!) 48   Temp 98.4 °F (36.9 °C) (Temporal)   Resp 16   Ht 5' 3" (1.6 m)   Wt 62.3 kg (137 lb 6.4 oz)   LMP  (LMP Unknown)   SpO2 97%   BMI 24.34 kg/m²     Physical Exam  Vitals and nursing note reviewed.   Constitutional:       General: She is not in acute distress.     Appearance: Normal appearance. She is well-developed. She is not diaphoretic.   HENT:      Head: Normocephalic and atraumatic.      Right Ear: External ear normal.      Left Ear: External ear normal.   Eyes:      General:         Right eye: No discharge.         Left eye: No discharge.   Cardiovascular:      Rate and Rhythm: Normal rate and regular rhythm.      Heart sounds: Normal heart sounds.   Pulmonary:      Effort: Pulmonary effort is normal.      Breath sounds: Normal breath sounds. No wheezing or rales.   Skin:     General: Skin is warm and dry.   Neurological:      Mental Status: She is alert and oriented to person, place, and time. Mental status is at baseline.   Psychiatric:         Mood and Affect: Mood normal.         Behavior: Behavior normal.         Thought Content: Thought content normal.         Judgment: Judgment normal.        Assessment/Plan     1. Essential hypertension  losartan (COZAAR) 50 MG tablet     Increase losartan to 50 mg twice a day.  Follow-up in 3-6 month.    Future Appointments   Date Time Provider Department Center   8/16/2022 10:30 AM Radha Little NP [facility] HEMONC3 [facility]   10/11/2022 11:00 AM [facility], LABORATORY [facility] LAB [facility]   10/11/2022 11:30 AM [facility] US1 [facility] US [facility]   10/17/2022  2:00 PM FRANCISCA Mac ENDOCRN Adithya Marshall MD  Family Medicine  [facility]                "

## 2022-08-16 ENCOUNTER — OFFICE VISIT (OUTPATIENT)
Dept: HEMATOLOGY/ONCOLOGY | Facility: CLINIC | Age: 72
End: 2022-08-16
Payer: MEDICARE

## 2022-08-16 VITALS
DIASTOLIC BLOOD PRESSURE: 66 MMHG | BODY MASS INDEX: 24.06 KG/M2 | OXYGEN SATURATION: 96 % | RESPIRATION RATE: 18 BRPM | WEIGHT: 135.81 LBS | HEIGHT: 63 IN | TEMPERATURE: 98 F | SYSTOLIC BLOOD PRESSURE: 118 MMHG | HEART RATE: 53 BPM

## 2022-08-16 DIAGNOSIS — Z86.000 HISTORY OF DUCTAL CARCINOMA IN SITU (DCIS) OF BREAST: ICD-10-CM

## 2022-08-16 DIAGNOSIS — M81.0 OSTEOPOROSIS OF FOREARM: ICD-10-CM

## 2022-08-16 DIAGNOSIS — Z85.3 HISTORY OF BREAST CANCER: Primary | ICD-10-CM

## 2022-08-16 PROCEDURE — 3288F PR FALLS RISK ASSESSMENT DOCUMENTED: ICD-10-PCS | Mod: CPTII,S$GLB,, | Performed by: NURSE PRACTITIONER

## 2022-08-16 PROCEDURE — 1126F PR PAIN SEVERITY QUANTIFIED, NO PAIN PRESENT: ICD-10-PCS | Mod: CPTII,S$GLB,, | Performed by: NURSE PRACTITIONER

## 2022-08-16 PROCEDURE — 4010F PR ACE/ARB THEARPY RXD/TAKEN: ICD-10-PCS | Mod: CPTII,S$GLB,, | Performed by: NURSE PRACTITIONER

## 2022-08-16 PROCEDURE — 3008F PR BODY MASS INDEX (BMI) DOCUMENTED: ICD-10-PCS | Mod: CPTII,S$GLB,, | Performed by: NURSE PRACTITIONER

## 2022-08-16 PROCEDURE — 99214 OFFICE O/P EST MOD 30 MIN: CPT | Mod: S$GLB,,, | Performed by: NURSE PRACTITIONER

## 2022-08-16 PROCEDURE — 3078F DIAST BP <80 MM HG: CPT | Mod: CPTII,S$GLB,, | Performed by: NURSE PRACTITIONER

## 2022-08-16 PROCEDURE — 3288F FALL RISK ASSESSMENT DOCD: CPT | Mod: CPTII,S$GLB,, | Performed by: NURSE PRACTITIONER

## 2022-08-16 PROCEDURE — 1126F AMNT PAIN NOTED NONE PRSNT: CPT | Mod: CPTII,S$GLB,, | Performed by: NURSE PRACTITIONER

## 2022-08-16 PROCEDURE — 3074F PR MOST RECENT SYSTOLIC BLOOD PRESSURE < 130 MM HG: ICD-10-PCS | Mod: CPTII,S$GLB,, | Performed by: NURSE PRACTITIONER

## 2022-08-16 PROCEDURE — 3078F PR MOST RECENT DIASTOLIC BLOOD PRESSURE < 80 MM HG: ICD-10-PCS | Mod: CPTII,S$GLB,, | Performed by: NURSE PRACTITIONER

## 2022-08-16 PROCEDURE — 4010F ACE/ARB THERAPY RXD/TAKEN: CPT | Mod: CPTII,S$GLB,, | Performed by: NURSE PRACTITIONER

## 2022-08-16 PROCEDURE — 3008F BODY MASS INDEX DOCD: CPT | Mod: CPTII,S$GLB,, | Performed by: NURSE PRACTITIONER

## 2022-08-16 PROCEDURE — 99999 PR PBB SHADOW E&M-EST. PATIENT-LVL III: ICD-10-PCS | Mod: PBBFAC,,, | Performed by: NURSE PRACTITIONER

## 2022-08-16 PROCEDURE — 99999 PR PBB SHADOW E&M-EST. PATIENT-LVL III: CPT | Mod: PBBFAC,,, | Performed by: NURSE PRACTITIONER

## 2022-08-16 PROCEDURE — 99214 PR OFFICE/OUTPT VISIT, EST, LEVL IV, 30-39 MIN: ICD-10-PCS | Mod: S$GLB,,, | Performed by: NURSE PRACTITIONER

## 2022-08-16 PROCEDURE — 3074F SYST BP LT 130 MM HG: CPT | Mod: CPTII,S$GLB,, | Performed by: NURSE PRACTITIONER

## 2022-08-16 PROCEDURE — 1101F PR PT FALLS ASSESS DOC 0-1 FALLS W/OUT INJ PAST YR: ICD-10-PCS | Mod: CPTII,S$GLB,, | Performed by: NURSE PRACTITIONER

## 2022-08-16 PROCEDURE — 1101F PT FALLS ASSESS-DOCD LE1/YR: CPT | Mod: CPTII,S$GLB,, | Performed by: NURSE PRACTITIONER

## 2022-08-16 PROCEDURE — 1159F PR MEDICATION LIST DOCUMENTED IN MEDICAL RECORD: ICD-10-PCS | Mod: CPTII,S$GLB,, | Performed by: NURSE PRACTITIONER

## 2022-08-16 PROCEDURE — 1159F MED LIST DOCD IN RCRD: CPT | Mod: CPTII,S$GLB,, | Performed by: NURSE PRACTITIONER

## 2022-08-16 NOTE — PROGRESS NOTES
Subjective:       Patient ID: Eva Joseph is a 72 y.o. female.    Chief Complaint: History of breast cancer    Here for annual follow up  In the interval, CT calcium scoring done and noted her to have a sclerotic lesion. This led to a bone scan which was negative.     Today, feels good.   No complaints today  No new pain issues.   Always had intermittent right shoulder soreness- notices more when doing arm stretches. this is no worse than usual. No pain today.   Seeing PCP regularly for high BP post covid.      Bone scan 1/2022 -   FINDINGS:  There is physiologic distribution of the radiopharmaceutical throughout the skeleton.  There is normal uptake in the genitourinary system and soft tissues.  Impression:  No scintigraphic evidence of osteoblastic metastatic disease.       Oncology History:  Diagnosed in 2002 with breast cancer (intracystic papillary)- very small, discovered at [place] by mammogram, Dr. Cosby wanted re-read on pathology and underwent lumpectomy, required re-excision and XRT.  Negative BRCA performed for strong family history (mother breast ca, maternal gm breast ca, maternal aunt- ovarian ca)  Received no adjuvant therapy     In December 2012- she had an abnormal left mammogram, bx +DCIS,, opted for  Bilateral mastectomy 1/31/13.  Took Evista x 1-2 months- leg cramps- stopped after discussing with Dr. Cosby due to side effects    Review of Systems   Constitutional: Negative for activity change, appetite change, fatigue, fever and unexpected weight change.   HENT: Negative for trouble swallowing.    Respiratory: Negative for cough and shortness of breath.    Gastrointestinal: Negative for abdominal distention, abdominal pain, constipation, diarrhea and nausea.   Genitourinary: Negative for decreased urine volume, difficulty urinating, dysuria and urgency.   Integumentary:  Negative for mole/lesion, breast mass and breast tenderness.   Neurological: Negative for light-headedness and  headaches.   Hematological: Negative for adenopathy. Does not bruise/bleed easily.   Psychiatric/Behavioral: Negative for dysphoric mood. The patient is not nervous/anxious.    Breast: Negative for mass and tenderness        Objective:      Physical Exam  Vitals and nursing note reviewed.   Constitutional:       General: She is not in acute distress.     Appearance: Normal appearance. She is well-developed and normal weight. She is not diaphoretic.      Comments: Presents alone   HENT:      Head: Normocephalic and atraumatic.      Mouth/Throat:      Pharynx: No oropharyngeal exudate or posterior oropharyngeal erythema.   Eyes:      General: No scleral icterus.        Right eye: No discharge.         Left eye: No discharge.      Extraocular Movements: Extraocular movements intact.      Conjunctiva/sclera: Conjunctivae normal.      Pupils: Pupils are equal, round, and reactive to light.      Right eye: Pupil is round and reactive.      Left eye: Pupil is round and reactive.   Neck:      Thyroid: No thyromegaly.      Vascular: No JVD.      Trachea: No tracheal deviation.   Cardiovascular:      Rate and Rhythm: Normal rate and regular rhythm.      Heart sounds: Normal heart sounds. No murmur heard.    No friction rub. No gallop.   Pulmonary:      Effort: Pulmonary effort is normal. No respiratory distress.      Breath sounds: Normal breath sounds. No wheezing or rales.      Comments: No breast masses or LAD  Chest:   Breasts:      Right: No supraclavicular adenopathy.      Left: No supraclavicular adenopathy.       Abdominal:      General: Bowel sounds are normal. There is no distension.      Palpations: Abdomen is soft. There is no mass.      Tenderness: There is no abdominal tenderness. There is no guarding or rebound.   Musculoskeletal:         General: No swelling or tenderness. Normal range of motion.      Cervical back: Normal range of motion and neck supple.      Right lower leg: No edema.      Left lower leg: No  edema.   Lymphadenopathy:      Head:      Right side of head: No submandibular adenopathy.      Left side of head: No submandibular adenopathy.      Cervical: No cervical adenopathy.      Right cervical: No superficial, deep or posterior cervical adenopathy.     Left cervical: No superficial, deep or posterior cervical adenopathy.      Upper Body:      Right upper body: No supraclavicular adenopathy.      Left upper body: No supraclavicular adenopathy.      Lower Body: No right inguinal adenopathy. No left inguinal adenopathy.   Skin:     General: Skin is warm and dry.      Coloration: Skin is not pale.      Findings: No bruising, erythema, lesion, petechiae or rash.   Neurological:      Mental Status: She is alert and oriented to person, place, and time.      Cranial Nerves: No cranial nerve deficit.      Sensory: No sensory deficit.      Motor: No abnormal muscle tone.      Coordination: Coordination normal.   Psychiatric:         Mood and Affect: Mood is not anxious or depressed.         Behavior: Behavior normal.         Thought Content: Thought content normal.         Judgment: Judgment normal.             Assessment:       1. History of breast cancer    2. History of ductal carcinoma in situ (DCIS) of breast    3. Osteoporosis of forearm        Plan:         1-2. USHA clinically.   She opts to follow up with PCP for annual chest wall/CBE exam.   RTC prn.   3. Sees endocrinology  Route Chart for Scheduling    Med Onc Chart Routing      Follow up with physician    Follow up with MADELAINE No follow up needed.   Infusion scheduling note    Injection scheduling note    Labs    Imaging    Pharmacy appointment    Other referrals             Patient is in agreement with the proposed treatment plan. All questions were answered to the patient's satisfaction. Pt knows to call clinic for any new or worsening symptoms and if anything is needed before the next clinic visit.      ADRIA Torres-HERLINDA  Hematology &  Oncology  [address] [place] [address]  ph. [phone]  Fax. [phone]     Face to Face time with patient: 20 minutes  30minutes of total time spent on the encounter, which includes face to face time and non-face to face time preparing to see the patient (eg, review of tests), Obtaining and/or reviewing separately obtained history, Documenting clinical information in the electronic or other health record, Independently interpreting results (not separately reported) and communicating results to the patient/family/caregiver, or Care coordination (not separately reported).

## 2022-09-08 ENCOUNTER — PATIENT MESSAGE (OUTPATIENT)
Dept: FAMILY MEDICINE | Facility: CLINIC | Age: 72
End: 2022-09-08
Payer: MEDICARE

## 2022-09-13 ENCOUNTER — OFFICE VISIT (OUTPATIENT)
Dept: FAMILY MEDICINE | Facility: CLINIC | Age: 72
End: 2022-09-13
Payer: MEDICARE

## 2022-09-13 VITALS
WEIGHT: 137 LBS | OXYGEN SATURATION: 98 % | RESPIRATION RATE: 14 BRPM | DIASTOLIC BLOOD PRESSURE: 72 MMHG | HEART RATE: 84 BPM | HEIGHT: 63 IN | TEMPERATURE: 98 F | SYSTOLIC BLOOD PRESSURE: 120 MMHG | BODY MASS INDEX: 24.27 KG/M2

## 2022-09-13 DIAGNOSIS — R23.8 SCALP IRRITATION: ICD-10-CM

## 2022-09-13 DIAGNOSIS — Z01.818 PREOP EXAMINATION: Primary | ICD-10-CM

## 2022-09-13 PROCEDURE — 3008F PR BODY MASS INDEX (BMI) DOCUMENTED: ICD-10-PCS | Mod: CPTII,S$GLB,, | Performed by: FAMILY MEDICINE

## 2022-09-13 PROCEDURE — 1159F MED LIST DOCD IN RCRD: CPT | Mod: CPTII,S$GLB,, | Performed by: FAMILY MEDICINE

## 2022-09-13 PROCEDURE — 1160F RVW MEDS BY RX/DR IN RCRD: CPT | Mod: CPTII,S$GLB,, | Performed by: FAMILY MEDICINE

## 2022-09-13 PROCEDURE — 1101F PT FALLS ASSESS-DOCD LE1/YR: CPT | Mod: CPTII,S$GLB,, | Performed by: FAMILY MEDICINE

## 2022-09-13 PROCEDURE — 3008F BODY MASS INDEX DOCD: CPT | Mod: CPTII,S$GLB,, | Performed by: FAMILY MEDICINE

## 2022-09-13 PROCEDURE — 1159F PR MEDICATION LIST DOCUMENTED IN MEDICAL RECORD: ICD-10-PCS | Mod: CPTII,S$GLB,, | Performed by: FAMILY MEDICINE

## 2022-09-13 PROCEDURE — 1101F PR PT FALLS ASSESS DOC 0-1 FALLS W/OUT INJ PAST YR: ICD-10-PCS | Mod: CPTII,S$GLB,, | Performed by: FAMILY MEDICINE

## 2022-09-13 PROCEDURE — 3288F PR FALLS RISK ASSESSMENT DOCUMENTED: ICD-10-PCS | Mod: CPTII,S$GLB,, | Performed by: FAMILY MEDICINE

## 2022-09-13 PROCEDURE — 99999 PR PBB SHADOW E&M-EST. PATIENT-LVL IV: ICD-10-PCS | Mod: PBBFAC,,, | Performed by: FAMILY MEDICINE

## 2022-09-13 PROCEDURE — 3074F PR MOST RECENT SYSTOLIC BLOOD PRESSURE < 130 MM HG: ICD-10-PCS | Mod: CPTII,S$GLB,, | Performed by: FAMILY MEDICINE

## 2022-09-13 PROCEDURE — 99213 OFFICE O/P EST LOW 20 MIN: CPT | Mod: S$GLB,,, | Performed by: FAMILY MEDICINE

## 2022-09-13 PROCEDURE — 1160F PR REVIEW ALL MEDS BY PRESCRIBER/CLIN PHARMACIST DOCUMENTED: ICD-10-PCS | Mod: CPTII,S$GLB,, | Performed by: FAMILY MEDICINE

## 2022-09-13 PROCEDURE — 4010F PR ACE/ARB THEARPY RXD/TAKEN: ICD-10-PCS | Mod: CPTII,S$GLB,, | Performed by: FAMILY MEDICINE

## 2022-09-13 PROCEDURE — 99213 PR OFFICE/OUTPT VISIT, EST, LEVL III, 20-29 MIN: ICD-10-PCS | Mod: S$GLB,,, | Performed by: FAMILY MEDICINE

## 2022-09-13 PROCEDURE — 1126F AMNT PAIN NOTED NONE PRSNT: CPT | Mod: CPTII,S$GLB,, | Performed by: FAMILY MEDICINE

## 2022-09-13 PROCEDURE — 3078F DIAST BP <80 MM HG: CPT | Mod: CPTII,S$GLB,, | Performed by: FAMILY MEDICINE

## 2022-09-13 PROCEDURE — 4010F ACE/ARB THERAPY RXD/TAKEN: CPT | Mod: CPTII,S$GLB,, | Performed by: FAMILY MEDICINE

## 2022-09-13 PROCEDURE — 3074F SYST BP LT 130 MM HG: CPT | Mod: CPTII,S$GLB,, | Performed by: FAMILY MEDICINE

## 2022-09-13 PROCEDURE — 1126F PR PAIN SEVERITY QUANTIFIED, NO PAIN PRESENT: ICD-10-PCS | Mod: CPTII,S$GLB,, | Performed by: FAMILY MEDICINE

## 2022-09-13 PROCEDURE — 3288F FALL RISK ASSESSMENT DOCD: CPT | Mod: CPTII,S$GLB,, | Performed by: FAMILY MEDICINE

## 2022-09-13 PROCEDURE — 3078F PR MOST RECENT DIASTOLIC BLOOD PRESSURE < 80 MM HG: ICD-10-PCS | Mod: CPTII,S$GLB,, | Performed by: FAMILY MEDICINE

## 2022-09-13 PROCEDURE — 99999 PR PBB SHADOW E&M-EST. PATIENT-LVL IV: CPT | Mod: PBBFAC,,, | Performed by: FAMILY MEDICINE

## 2022-09-13 RX ORDER — BETAMETHASONE DIPROPIONATE 0.5 MG/G
LOTION TOPICAL 2 TIMES DAILY
Qty: 60 ML | Refills: 2 | Status: SHIPPED | OUTPATIENT
Start: 2022-09-13

## 2022-09-13 RX ORDER — KETOCONAZOLE 20 MG/ML
SHAMPOO, SUSPENSION TOPICAL
Qty: 120 ML | Refills: 2 | Status: SHIPPED | OUTPATIENT
Start: 2022-09-15 | End: 2023-02-27 | Stop reason: SDUPTHER

## 2022-09-13 NOTE — PROGRESS NOTES
[facility] - Clinic Note    Subjective      Ms. Joseph is a 72 y.o. female who presents to clinic for Pre-op Exam (Eye surgery next week)    Patient is going to have cataract surgery next week.  She is doing well overall.  Denies any chest pain, shortness of breath, headaches    [facility] Eva has a past medical history of Breast cancer (2000), Depression, Essential hypertension (5/18/2022), Hypertriglyceridemia (7/21/2019), Nephrolithiasis (2/5/2015), and Osteopenia.   PSX Eva has a past surgical history that includes Breast lumpectomy (2000); Hysterectomy (1998); Bilateral oophorectomy; Femoral hernia (2010); Mastectomy (2013 ); Tonsillectomy; Adenoidectomy; Hemorrhoid surgery; Breast reconstruction (Bilateral, 2013); and Oophorectomy.    Eva's family history includes Breast cancer in her maternal grandmother and mother; Cancer in her maternal aunt and maternal grandfather; Heart disease (age of onset: 56) in her father; Hyperlipidemia in her son; Hypertension in her father; No Known Problems in her brother, daughter, daughter, sister, and sister; Ovarian cancer in her maternal aunt; Parkinsonism in her paternal grandfather; Skin cancer in her brother; Stroke in her paternal grandmother.    Eva reports that she has never smoked. She has never used smokeless tobacco. She reports current alcohol use of about 1.0 standard drink per week. She reports that she does not use drugs.   BILLY Velasquez is allergic to sulfa (sulfonamide antibiotics).   MED Eva has a current medication list which includes the following prescription(s): alendronate, amino acids-minerals, ascorbic acid (vitamin c), coenzyme q10-vitamin e, fish oil-dha-epa, glucosamine-chondroitin, losartan, minoxidil, multivitamin, rosuvastatin, vitamin d3-vitamin k2, betamethasone dipropionate, hydralazine, and [START ON 9/15/2022] ketoconazole.     Review of Systems   Constitutional:  Negative for chills and fever.   Respiratory:   "Negative for shortness of breath.    Cardiovascular:  Negative for chest pain.   Neurological:  Negative for dizziness.   Objective     /72 (BP Location: Left arm, Patient Position: Sitting, BP Method: Medium (Manual))   Pulse 84   Temp 98.1 °F (36.7 °C) (Temporal)   Resp 14   Ht 5' 3" (1.6 m)   Wt 62.1 kg (137 lb)   LMP  (LMP Unknown)   SpO2 98%   BMI 24.27 kg/m²     Physical Exam  Vitals and nursing note reviewed.   Constitutional:       General: She is not in acute distress.     Appearance: Normal appearance. She is well-developed. She is not diaphoretic.   HENT:      Head: Normocephalic and atraumatic.      Right Ear: External ear normal.      Left Ear: External ear normal.   Eyes:      General:         Right eye: No discharge.         Left eye: No discharge.   Cardiovascular:      Rate and Rhythm: Normal rate and regular rhythm.      Heart sounds: Normal heart sounds.   Pulmonary:      Effort: Pulmonary effort is normal.      Breath sounds: Normal breath sounds. No wheezing or rales.   Skin:     General: Skin is warm and dry.   Neurological:      Mental Status: She is alert and oriented to person, place, and time. Mental status is at baseline.   Psychiatric:         Mood and Affect: Mood normal.         Behavior: Behavior normal.         Thought Content: Thought content normal.         Judgment: Judgment normal.      Assessment/Plan     1. Preop examination        2. Scalp irritation  betamethasone dipropionate (DIPROLENE) 0.05 % lotion    ketoconazole (NIZORAL) 2 % shampoo        Preoperative examination:  Patient is currently medically optimized. Based on RCRI patient is low risk for a low to moderate risk procedure.  Patient has good exercise tolerance and should do well with the surgery.    Future Appointments   Date Time Provider Department Center   10/11/2022 11:00 AM [facility], LABORATORY [facility] LAB [facility]   10/11/2022 11:30 AM [facility] US1 [facility] US [facility]   10/17/2022  2:00 PM LEYLA Handy" FRANCISCA Ghotra SLIC ENDOCRN [place]   1/3/2023  7:45 AM Fani Frazier MD SM2C DERM [place]         Micha Marshall MD  Family Medicine  [facility]

## 2022-10-11 ENCOUNTER — HOSPITAL ENCOUNTER (OUTPATIENT)
Dept: RADIOLOGY | Facility: HOSPITAL | Age: 72
Discharge: HOME | End: 2022-10-11
Attending: PHYSICIAN ASSISTANT
Payer: MEDICARE

## 2022-10-11 DIAGNOSIS — E04.1 THYROID NODULE: ICD-10-CM

## 2022-10-11 PROCEDURE — 76536 US EXAM OF HEAD AND NECK: CPT | Mod: 26,,, | Performed by: RADIOLOGY

## 2022-10-11 PROCEDURE — 76536 US EXAM OF HEAD AND NECK: CPT | Mod: TC

## 2022-10-11 PROCEDURE — 76536 US SOFT TISSUE HEAD NECK THYROID: ICD-10-PCS | Mod: 26,,, | Performed by: RADIOLOGY

## 2022-10-17 ENCOUNTER — OFFICE VISIT (OUTPATIENT)
Dept: ENDOCRINOLOGY | Facility: CLINIC | Age: 72
End: 2022-10-17
Payer: MEDICARE

## 2022-10-17 VITALS
DIASTOLIC BLOOD PRESSURE: 82 MMHG | TEMPERATURE: 98 F | BODY MASS INDEX: 23.65 KG/M2 | WEIGHT: 133.5 LBS | OXYGEN SATURATION: 96 % | SYSTOLIC BLOOD PRESSURE: 140 MMHG | HEIGHT: 63 IN | HEART RATE: 61 BPM

## 2022-10-17 DIAGNOSIS — E78.1 HYPERTRIGLYCERIDEMIA: ICD-10-CM

## 2022-10-17 DIAGNOSIS — L29.9 ITCHING: ICD-10-CM

## 2022-10-17 DIAGNOSIS — Z78.0 POSTMENOPAUSAL: ICD-10-CM

## 2022-10-17 DIAGNOSIS — M81.0 OSTEOPOROSIS, UNSPECIFIED OSTEOPOROSIS TYPE, UNSPECIFIED PATHOLOGICAL FRACTURE PRESENCE: ICD-10-CM

## 2022-10-17 DIAGNOSIS — E55.9 HYPOVITAMINOSIS D: ICD-10-CM

## 2022-10-17 DIAGNOSIS — E78.5 HYPERLIPIDEMIA, UNSPECIFIED HYPERLIPIDEMIA TYPE: ICD-10-CM

## 2022-10-17 DIAGNOSIS — E21.0 PRIMARY HYPERPARATHYROIDISM: Primary | ICD-10-CM

## 2022-10-17 DIAGNOSIS — I10 PRIMARY HYPERTENSION: ICD-10-CM

## 2022-10-17 DIAGNOSIS — E04.1 THYROID NODULE: ICD-10-CM

## 2022-10-17 PROCEDURE — 3077F SYST BP >= 140 MM HG: CPT | Mod: CPTII,S$GLB,, | Performed by: PHYSICIAN ASSISTANT

## 2022-10-17 PROCEDURE — 3288F PR FALLS RISK ASSESSMENT DOCUMENTED: ICD-10-PCS | Mod: CPTII,S$GLB,, | Performed by: PHYSICIAN ASSISTANT

## 2022-10-17 PROCEDURE — 1126F AMNT PAIN NOTED NONE PRSNT: CPT | Mod: CPTII,S$GLB,, | Performed by: PHYSICIAN ASSISTANT

## 2022-10-17 PROCEDURE — 3079F DIAST BP 80-89 MM HG: CPT | Mod: CPTII,S$GLB,, | Performed by: PHYSICIAN ASSISTANT

## 2022-10-17 PROCEDURE — 1126F PR PAIN SEVERITY QUANTIFIED, NO PAIN PRESENT: ICD-10-PCS | Mod: CPTII,S$GLB,, | Performed by: PHYSICIAN ASSISTANT

## 2022-10-17 PROCEDURE — 99214 OFFICE O/P EST MOD 30 MIN: CPT | Mod: S$GLB,,, | Performed by: PHYSICIAN ASSISTANT

## 2022-10-17 PROCEDURE — 4010F PR ACE/ARB THEARPY RXD/TAKEN: ICD-10-PCS | Mod: CPTII,S$GLB,, | Performed by: PHYSICIAN ASSISTANT

## 2022-10-17 PROCEDURE — 1101F PT FALLS ASSESS-DOCD LE1/YR: CPT | Mod: CPTII,S$GLB,, | Performed by: PHYSICIAN ASSISTANT

## 2022-10-17 PROCEDURE — 1160F PR REVIEW ALL MEDS BY PRESCRIBER/CLIN PHARMACIST DOCUMENTED: ICD-10-PCS | Mod: CPTII,S$GLB,, | Performed by: PHYSICIAN ASSISTANT

## 2022-10-17 PROCEDURE — 99214 PR OFFICE/OUTPT VISIT, EST, LEVL IV, 30-39 MIN: ICD-10-PCS | Mod: S$GLB,,, | Performed by: PHYSICIAN ASSISTANT

## 2022-10-17 PROCEDURE — 3077F PR MOST RECENT SYSTOLIC BLOOD PRESSURE >= 140 MM HG: ICD-10-PCS | Mod: CPTII,S$GLB,, | Performed by: PHYSICIAN ASSISTANT

## 2022-10-17 PROCEDURE — 99999 PR PBB SHADOW E&M-EST. PATIENT-LVL IV: CPT | Mod: PBBFAC,,, | Performed by: PHYSICIAN ASSISTANT

## 2022-10-17 PROCEDURE — 1159F PR MEDICATION LIST DOCUMENTED IN MEDICAL RECORD: ICD-10-PCS | Mod: CPTII,S$GLB,, | Performed by: PHYSICIAN ASSISTANT

## 2022-10-17 PROCEDURE — 1101F PR PT FALLS ASSESS DOC 0-1 FALLS W/OUT INJ PAST YR: ICD-10-PCS | Mod: CPTII,S$GLB,, | Performed by: PHYSICIAN ASSISTANT

## 2022-10-17 PROCEDURE — 99999 PR PBB SHADOW E&M-EST. PATIENT-LVL IV: ICD-10-PCS | Mod: PBBFAC,,, | Performed by: PHYSICIAN ASSISTANT

## 2022-10-17 PROCEDURE — 3079F PR MOST RECENT DIASTOLIC BLOOD PRESSURE 80-89 MM HG: ICD-10-PCS | Mod: CPTII,S$GLB,, | Performed by: PHYSICIAN ASSISTANT

## 2022-10-17 PROCEDURE — 3288F FALL RISK ASSESSMENT DOCD: CPT | Mod: CPTII,S$GLB,, | Performed by: PHYSICIAN ASSISTANT

## 2022-10-17 PROCEDURE — 1160F RVW MEDS BY RX/DR IN RCRD: CPT | Mod: CPTII,S$GLB,, | Performed by: PHYSICIAN ASSISTANT

## 2022-10-17 PROCEDURE — 1159F MED LIST DOCD IN RCRD: CPT | Mod: CPTII,S$GLB,, | Performed by: PHYSICIAN ASSISTANT

## 2022-10-17 PROCEDURE — 4010F ACE/ARB THERAPY RXD/TAKEN: CPT | Mod: CPTII,S$GLB,, | Performed by: PHYSICIAN ASSISTANT

## 2022-10-17 RX ORDER — AMLODIPINE BESYLATE 5 MG/1
5 TABLET ORAL DAILY
Qty: 30 TABLET | Refills: 11 | Status: SHIPPED | OUTPATIENT
Start: 2022-10-17 | End: 2022-10-27

## 2022-10-17 NOTE — PROGRESS NOTES
CC: Hyperparathyroidism/Nodular thyroid disease and osteopenia    HPI: Eva Joseph is a 72 y.o. female here for nodular thyroid disease along with pending conditions listed in the Visit Diagnosis. No fhx of thyroid disease. Her grandson has Type 1 DM. Patient had bilateral breast cancer for which she was treated with bilateral mastectomy. She was -ve for BRCA1 and 2. 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 She had COVID-19 at [place].    Thyroid u/s 10/22 shows 1.8 cm nodule in left lobe that had a benign FNA. No SOB or voice changes. + TPO.     Kidney stones 1970, 1977 w/ pregnancies. She also had one stone in 2005. No n/v, abdominal pain or muscle weakness.     DEXA scan: 8/21 osteopenia. Taking fosamax 70 mg weekly. No fractures, falls or steriod injections. Phx of osteoporosis and took reclast for three years. Not taking calcium carbonate. Taking 2000 IU of vitamin d. She has been playing [employer] ball once weekly and the gym 2x weekly (step class). She also does a stretching class.  No recent kidney stones. No steriod injections.    + fatigue, hair loss, depressed, heat intolerance, mental fogginess. No palpitations or tremors.    Pt is taking atarax for itching on her scalp.    PMHx, PSHx: reviewed in epic.    Social Hx: no ETOH/tobacco use.     Wt Readings from Last 5 Encounters:   09/13/22 62.1 kg (137 lb)   08/16/22 61.6 kg (135 lb 12.9 oz)   07/20/22 62.3 kg (137 lb 6.4 oz)   05/18/22 62.8 kg (138 lb 6.4 oz)   03/30/22 63 kg (138 lb 12.8 oz)      ROS:   Constitutional: no fatigue, wt gain (5 lbs since 7/20).  Eyes: No recent visual changes  Cardiovascular: + feet sweling, Denies current anginal symptoms  Respiratory: Denies current respiratory difficulty  Gastrointestinal: Denies recent bowel disturbances  GenitoUrinary - No dysuria  Skin: + scalp itching  Neurologic: + numbness and tingling in feet  Endocrine: no polyphagia, polydipsia or polyuria  Psych: no  "anxiety or depression  Remainder ROS negative     BP (!) 140/82 (BP Location: Left arm, Patient Position: Sitting, BP Method: Small (Manual))   Pulse 61   Temp 98.2 °F (36.8 °C) (Oral)   Ht 5' 3" (1.6 m)   Wt 60.6 kg (133 lb 7.8 oz)   LMP  (LMP Unknown)   SpO2 96%   BMI 23.65 kg/m²      Personally reviewed labs below:  Lab Results   Component Value Date    TSH 1.488 10/11/2022    Q8IXSLV 80 01/22/2020    FREET4 0.96 12/16/2021     Lab Results   Component Value Date    PTH 68.4 10/11/2022    CALCIUM 9.9 10/11/2022    CAION 1.30 10/11/2022    PHOS 2.6 (L) 10/11/2022         Chemistry        Component Value Date/Time     10/11/2022 1102    K 5.0 10/11/2022 1102     10/11/2022 1102    CO2 26 10/11/2022 1102    BUN 16 10/11/2022 1102    CREATININE 0.8 10/11/2022 1102     10/11/2022 1102        Component Value Date/Time    CALCIUM 9.9 10/11/2022 1102    ALKPHOS 70 12/16/2021 0832    AST 23 12/16/2021 0832    ALT 20 12/16/2021 0832    BILITOT 0.6 12/16/2021 0832    ESTGFRAFRICA >60.0 12/16/2021 0832    EGFRNONAA >60.0 12/16/2021 0832         No results found for: HGBA1C     EXAMINATION:  US SOFT TISSUE HEAD NECK THYROID     CLINICAL HISTORY:  Nontoxic single thyroid nodule     TECHNIQUE:  Ultrasound of the thyroid and cervical lymph nodes was performed.     COMPARISON:  Ultrasound 01/13/2022.     FINDINGS:  Thyroid lobes are prominent size measuring 5.2 x 1.6 x 1.7 cm on the right and 5.1 x 2.0 x 1.8 cm on left.  This measures to a volume of 16.9 cc.  The isthmus measures 0.40 cm.  Thyroid lobes demonstrate normal blood flow by color Doppler.     There are mixed cystic and solid thyroid nodules.  Dominant nodule on the right measures 0.6 x 0.3 x 0.3 cm.  This does not meet criteria for FNA.  Dominant nodule left measures 1.8 x 1.4 x 1.4 cm.  This is a solid TI-RADS 3 nodule and meets criteria for ultrasound follow-up.     Multiple small benign appearing cervical lymph nodes are identified.   "   Impression:     1. Chronic multinodular thyroid.  2. Left TI-RADS 3 nodule which meets criteria for ultrasound follow-up.        Electronically signed by: Hipolito Chaudhary  Date:                                            10/11/2022  Time:                                           14:34    PE:  GENERAL: elderly female (looks younger than stated age), well developed, well nourished  NECK: Supple neck, normal thyroid. No bruit  LYMPHATIC: No cervical or supraclavicular lymphadenopathy  CARDIOVASCULAR: Normal heart sounds, no pedal edema  RESPIRATORY: Normal effort, clear to auscultation bl  FEET: appropriate footwear.   NEURO: steady gait, CN ll-Xll grossly intact  SKIN: no rash seen on scalp  Psych: normal mood and affect    Assessment/Plan:   1. Primary hyperparathyroidism  PTH, Intact    Calcium, Ionized    Comprehensive Metabolic Panel      2. Osteoporosis, unspecified osteoporosis type, unspecified pathological fracture presence        3. Thyroid nodule  TSH      4. Hypovitaminosis D  Vitamin D      5. Hypertriglyceridemia  ALT (SGPT)    AST (SGOT)      6. Itching        7. Postmenopausal  DXA Bone Density Spine And Hip      8. Hyperlipidemia, unspecified hyperlipidemia type  Lipid Panel      9. Primary hypertension  amLODIPine (NORVASC) 5 MG tablet        Primary hyperparathyroidism-PTH wnl. Ca normal.  Osteoporosis-continue fosamax, ca and vd. Repeat DEXA 8/23. Continue exercise.  Thyroid nodule-TSH wnl. Repeat thyroid u/s 10/23.  Postmenopausal-see above  Hypovitaminosis P-ckwaum-xwcbnxkr otc vd  Rcexrecbpemfueszljiy-mtbtqh-qmwkfwnj fish oil and ASA.  Itching-continue atarax 10 mg daily.  HTN-elevated-start norvasc 5 mg qd.    Ast, alt, lp fasting  Apt in cardiology-not dr. neumann  F/u in one year w/ labs

## 2022-10-25 ENCOUNTER — LAB VISIT (OUTPATIENT)
Dept: LAB | Facility: HOSPITAL | Age: 72
End: 2022-10-25
Attending: PHYSICIAN ASSISTANT
Payer: MEDICARE

## 2022-10-25 DIAGNOSIS — E78.1 HYPERTRIGLYCERIDEMIA: ICD-10-CM

## 2022-10-25 DIAGNOSIS — E78.5 HYPERLIPIDEMIA, UNSPECIFIED HYPERLIPIDEMIA TYPE: ICD-10-CM

## 2022-10-25 LAB
ALT SERPL W/O P-5'-P-CCNC: 16 U/L (ref 10–44)
AST SERPL-CCNC: 17 U/L (ref 10–40)
CHOLEST SERPL-MCNC: 133 MG/DL (ref 120–199)
CHOLEST/HDLC SERPL: 2 {RATIO} (ref 2–5)
HDLC SERPL-MCNC: 66 MG/DL (ref 40–75)
HDLC SERPL: 49.6 % (ref 20–50)
LDLC SERPL CALC-MCNC: 57.4 MG/DL (ref 63–159)
NONHDLC SERPL-MCNC: 67 MG/DL
TRIGL SERPL-MCNC: 48 MG/DL (ref 30–150)

## 2022-10-25 PROCEDURE — 84460 ALANINE AMINO (ALT) (SGPT): CPT | Performed by: PHYSICIAN ASSISTANT

## 2022-10-25 PROCEDURE — 84450 TRANSFERASE (AST) (SGOT): CPT | Performed by: PHYSICIAN ASSISTANT

## 2022-10-25 PROCEDURE — 80061 LIPID PANEL: CPT | Performed by: PHYSICIAN ASSISTANT

## 2022-10-25 PROCEDURE — 36415 COLL VENOUS BLD VENIPUNCTURE: CPT | Performed by: PHYSICIAN ASSISTANT

## 2022-10-27 ENCOUNTER — OFFICE VISIT (OUTPATIENT)
Dept: CARDIOLOGY | Facility: CLINIC | Age: 72
End: 2022-10-27
Payer: MEDICARE

## 2022-10-27 VITALS
HEART RATE: 61 BPM | SYSTOLIC BLOOD PRESSURE: 140 MMHG | WEIGHT: 136.25 LBS | DIASTOLIC BLOOD PRESSURE: 66 MMHG | OXYGEN SATURATION: 95 % | HEIGHT: 63 IN | BODY MASS INDEX: 24.14 KG/M2

## 2022-10-27 DIAGNOSIS — R03.0 ELEVATED BLOOD PRESSURE READING: ICD-10-CM

## 2022-10-27 DIAGNOSIS — R93.1 AGATSTON CORONARY ARTERY CALCIUM SCORE BETWEEN 100 AND 199: ICD-10-CM

## 2022-10-27 DIAGNOSIS — I70.0 AORTIC ATHEROSCLEROSIS: ICD-10-CM

## 2022-10-27 DIAGNOSIS — Z91.89 CARDIOVASCULAR EVENT RISK: ICD-10-CM

## 2022-10-27 DIAGNOSIS — I10 ESSENTIAL HYPERTENSION: Primary | ICD-10-CM

## 2022-10-27 PROCEDURE — 1159F PR MEDICATION LIST DOCUMENTED IN MEDICAL RECORD: ICD-10-PCS | Mod: CPTII,S$GLB,, | Performed by: INTERNAL MEDICINE

## 2022-10-27 PROCEDURE — 1126F PR PAIN SEVERITY QUANTIFIED, NO PAIN PRESENT: ICD-10-PCS | Mod: CPTII,S$GLB,, | Performed by: INTERNAL MEDICINE

## 2022-10-27 PROCEDURE — 99214 PR OFFICE/OUTPT VISIT, EST, LEVL IV, 30-39 MIN: ICD-10-PCS | Mod: S$GLB,,, | Performed by: INTERNAL MEDICINE

## 2022-10-27 PROCEDURE — 99999 PR PBB SHADOW E&M-EST. PATIENT-LVL IV: ICD-10-PCS | Mod: PBBFAC,,, | Performed by: INTERNAL MEDICINE

## 2022-10-27 PROCEDURE — 3077F SYST BP >= 140 MM HG: CPT | Mod: CPTII,S$GLB,, | Performed by: INTERNAL MEDICINE

## 2022-10-27 PROCEDURE — 3288F PR FALLS RISK ASSESSMENT DOCUMENTED: ICD-10-PCS | Mod: CPTII,S$GLB,, | Performed by: INTERNAL MEDICINE

## 2022-10-27 PROCEDURE — 3288F FALL RISK ASSESSMENT DOCD: CPT | Mod: CPTII,S$GLB,, | Performed by: INTERNAL MEDICINE

## 2022-10-27 PROCEDURE — 4010F PR ACE/ARB THEARPY RXD/TAKEN: ICD-10-PCS | Mod: CPTII,S$GLB,, | Performed by: INTERNAL MEDICINE

## 2022-10-27 PROCEDURE — 3078F DIAST BP <80 MM HG: CPT | Mod: CPTII,S$GLB,, | Performed by: INTERNAL MEDICINE

## 2022-10-27 PROCEDURE — 1101F PR PT FALLS ASSESS DOC 0-1 FALLS W/OUT INJ PAST YR: ICD-10-PCS | Mod: CPTII,S$GLB,, | Performed by: INTERNAL MEDICINE

## 2022-10-27 PROCEDURE — 3078F PR MOST RECENT DIASTOLIC BLOOD PRESSURE < 80 MM HG: ICD-10-PCS | Mod: CPTII,S$GLB,, | Performed by: INTERNAL MEDICINE

## 2022-10-27 PROCEDURE — 99214 OFFICE O/P EST MOD 30 MIN: CPT | Mod: S$GLB,,, | Performed by: INTERNAL MEDICINE

## 2022-10-27 PROCEDURE — 1126F AMNT PAIN NOTED NONE PRSNT: CPT | Mod: CPTII,S$GLB,, | Performed by: INTERNAL MEDICINE

## 2022-10-27 PROCEDURE — 99999 PR PBB SHADOW E&M-EST. PATIENT-LVL IV: CPT | Mod: PBBFAC,,, | Performed by: INTERNAL MEDICINE

## 2022-10-27 PROCEDURE — 1159F MED LIST DOCD IN RCRD: CPT | Mod: CPTII,S$GLB,, | Performed by: INTERNAL MEDICINE

## 2022-10-27 PROCEDURE — 1101F PT FALLS ASSESS-DOCD LE1/YR: CPT | Mod: CPTII,S$GLB,, | Performed by: INTERNAL MEDICINE

## 2022-10-27 PROCEDURE — 3077F PR MOST RECENT SYSTOLIC BLOOD PRESSURE >= 140 MM HG: ICD-10-PCS | Mod: CPTII,S$GLB,, | Performed by: INTERNAL MEDICINE

## 2022-10-27 PROCEDURE — 4010F ACE/ARB THERAPY RXD/TAKEN: CPT | Mod: CPTII,S$GLB,, | Performed by: INTERNAL MEDICINE

## 2022-10-27 RX ORDER — VALSARTAN AND HYDROCHLOROTHIAZIDE 160; 12.5 MG/1; MG/1
1 TABLET, FILM COATED ORAL DAILY
Qty: 90 TABLET | Refills: 3 | Status: SHIPPED | OUTPATIENT
Start: 2022-10-27 | End: 2022-12-20 | Stop reason: SDUPTHER

## 2022-10-27 RX ORDER — ASPIRIN 81 MG/1
81 TABLET ORAL DAILY
Refills: 0
Start: 2022-10-27 | End: 2024-03-20

## 2022-10-27 RX ORDER — ROSUVASTATIN CALCIUM 5 MG/1
5 TABLET, COATED ORAL DAILY
Qty: 90 TABLET | Refills: 3 | Status: SHIPPED | OUTPATIENT
Start: 2022-10-27 | End: 2022-12-20 | Stop reason: SDUPTHER

## 2022-10-27 NOTE — PROGRESS NOTES
Subjective:   Chief Complaint: Establish Care, Follow-up, Hypertension (Since COVID diagnosis 12/21), Dizziness, Fatigue, and Chest Pain  Last Clinic Visit: New Patient    History of Present Illness: Eva Joseph is a 72 y.o. lady with hypertension, hyperlipidemia, family history of atherosclerosis, aortic atherosclerosis, coronary artery disease by calcification, who presents to re-establish cardiology care she most recently saw Dr. Whittington on the [facility].  She is a retired RN.  She reports a prior diagnosis of breast cancer initially 20 then 10 years ago, denies any history of chemotherapy, she did have radiation but only on the right side.  Family history of coronary disease in her father having an MI at 68, brother with coronary disease at 65.  She recently had COVID December of 2021, reports with antibody infusion therapy around that time she had systolic blood pressures in the 190s which was new for her previously blood pressure had not been an issue she was prescribed hydralazine p.r.n. which she did not take but did follow-up with cardiologist to prescribed losartan 50 b.i.d..  Subsequent testing included echocardiogram and coronary calcium score, echo was grossly normal coronary calcium score did show elevated calcium.  She exercises on a regular basis and denies any significant chest pain or change in exercise tolerance, no tobacco use.  Does not add salt, does eat out intermittently.  With losartan 50 b.i.d. she has been checking blood pressures at home and they are running anywhere from 130-160 systolic.  Recently prescribed amlodipine by endocrinologist but has not started taking.  She did start taking Crestor 5 mg after coronary artery calcium score is elevated LDL has come down 57.  Her  denies that she snores.  She does report fatigue.    Dx:  Coronary artery disease by calcification   Aortic atherosclerosis   Family history of atherosclerosis   Hypertension   Hyperlipidemia   Breast cancer      Medications:  Outpatient Encounter Medications as of 10/27/2022   Medication Sig Dispense Refill    alendronate (FOSAMAX) 70 MG tablet Take one tablet every 7 days. 12 tablet 3    amino acids-minerals Tab Take 2 tablets by mouth 3 (three) times daily.      ascorbic acid, vitamin C, (VITAMIN C) 100 MG tablet   Daily, 0 Refill(s), Maintenance      betamethasone dipropionate (DIPROLENE) 0.05 % lotion Apply topically 2 (two) times daily. 60 mL 2    coenzyme Q10-vitamin E 100-100 mg-unit Cap Take by mouth. 1 Capsule Oral Every day      fish oil-dha-epa 1,200-144-216 mg Cap Take by mouth. 1 Capsule Oral Every day      glucosamine-chondroitin 500-400 mg tablet Take 1 tablet by mouth 3 (three) times daily.      ketoconazole (NIZORAL) 2 % shampoo Apply topically twice a week. 120 mL 2    minoxidiL 5 % Foam Apply topically once daily.      multivitamin (THERAGRAN) per tablet Take by mouth. 1 Tablet Oral Every day      vitamin D3-vitamin K2 1,250-200 mcg Cap   0 Refill(s)      [DISCONTINUED] losartan (COZAAR) 50 MG tablet Take 1 tablet (50 mg total) by mouth 2 (two) times a day. 180 tablet 3    [DISCONTINUED] rosuvastatin (CRESTOR) 5 MG tablet Take 1 tablet (5 mg total) by mouth once daily. 90 tablet 3    aspirin (ECOTRIN) 81 MG EC tablet Take 1 tablet (81 mg total) by mouth once daily.  0    rosuvastatin (CRESTOR) 5 MG tablet Take 1 tablet (5 mg total) by mouth once daily. 90 tablet 3    valsartan-hydrochlorothiazide (DIOVAN-HCT) 160-12.5 mg per tablet Take 1 tablet by mouth once daily. 90 tablet 3    [DISCONTINUED] amLODIPine (NORVASC) 5 MG tablet Take 1 tablet (5 mg total) by mouth once daily. (Patient not taking: Reported on 10/27/2022) 30 tablet 11     No facility-administered encounter medications on file as of 10/27/2022.     Social History:  Eva reports that she has never smoked. She has never used smokeless tobacco. She reports current alcohol use of about 1.0 standard drink per week. She reports that she  "does not use drugs.  She is a retired RN    Objective:   BP (!) 140/66 (BP Location: Left arm, Patient Position: Sitting, BP Method: Medium (Automatic))   Pulse 61   Ht 5' 3" (1.6 m)   Wt 61.8 kg (136 lb 3.9 oz)   LMP  (LMP Unknown)   SpO2 95%   BMI 24.13 kg/m²     Physical Exam   HENT:   Head: Normocephalic and atraumatic.   Mouth/Throat: Mucous membranes are moist.   Cardiovascular: Normal rate, regular rhythm and normal pulses. Exam reveals no gallop and no friction rub.   No murmur heard.  Pulmonary/Chest: Effort normal and breath sounds normal. No stridor. No respiratory distress. She has no rhonchi. She has no rales. She exhibits no tenderness.   Abdominal: Normal appearance. She exhibits no distension.   Musculoskeletal:      Right lower leg: No edema.      Left lower leg: No edema.   Neurological: She is alert.   Skin: Skin is warm. Capillary refill takes less than 2 seconds.      EKG:  My independent visualization of most recent EKG is normal sinus rhythm, nonspecific ST changes    TTE:  01/11/2022   The left ventricle is normal in size with concentric hypertrophy and normal systolic function.  The estimated ejection fraction is 62%.  Normal left ventricular diastolic function.  The left ventricular global longitudinal strain is -17%. Borderline.  Normal right ventricular size with normal right ventricular systolic function.  Normal central venous pressure (3 mmHg).  The estimated PA systolic pressure is 19 mmHg.  Mild left atrial enlargement.     Lipids:  Recent Labs   Lab 10/25/22  0812   LDL Cholesterol 57.4 L   HDL 66      Renal:  Recent Labs   Lab 10/11/22  1102   Creatinine 0.8   Potassium 5.0   CO2 26   BUN 16     Liver:  Recent Labs   Lab 10/25/22  0812   AST 17   ALT 16     Assessment:     1. Essential hypertension    2. Elevated blood pressure reading    3. Agatston coronary artery calcium score between 100 and 199, 155 in 2022    4. Aortic atherosclerosis    5. Cardiovascular event risk, " ASCVD 10-years risk 16.5%, 2020      Plan:   1. Essential hypertension  Blood pressure improved from reported higher numbers, some of these could be white coat related, but with her home log showing 130-160, averaging around the 145 range, her blood pressure is still above goal.  Explained blood pressure goals, risk of adverse events.  From a secondary etiology, no reported snoring, no electrolyte abnormality suspicious for primary hyper aldosteronism.  No palpitations suspicious for pheochromocytoma  Will treat this as primary hypertension, changing losartan to more effective ARB and adding low-dose diuretic combination pill.  Follow-up CMP in 2 weeks, will reach out to her that time to see what her blood pressure has been running on this new pill.  Follow-up clinic visit in 2-3 months  - valsartan-hydrochlorothiazide (DIOVAN-HCT) 160-12.5 mg per tablet; Take 1 tablet by mouth once daily.  Dispense: 90 tablet; Refill: 3  - Comprehensive Metabolic Panel; Future  - aspirin (ECOTRIN) 81 MG EC tablet; Take 1 tablet (81 mg total) by mouth once daily.; Refill: 0    2. Elevated blood pressure reading  As above  - rosuvastatin (CRESTOR) 5 MG tablet; Take 1 tablet (5 mg total) by mouth once daily.  Dispense: 90 tablet; Refill: 3    3. Agatston coronary artery calcium score between 100 and 199, 155 in 2022  Significant calcific atherosclerosis does increase her risk of major adverse cardiac event over the next 10 years, recommending low-dose daily ASA as well as continuing statin she is currently on, Crestor is working well recent LFTs within normal limits, will check lipids annually.    4. Aortic atherosclerosis  Continue Crestor continue aspirin    5. Cardiovascular event risk, ASCVD 10-years risk 16.5%, 2020  As above    Follow up in 2-3 months      Vinny Gomez MD [facility]

## 2022-11-10 ENCOUNTER — LAB VISIT (OUTPATIENT)
Dept: LAB | Facility: HOSPITAL | Age: 72
End: 2022-11-10
Attending: INTERNAL MEDICINE
Payer: MEDICARE

## 2022-11-10 DIAGNOSIS — I10 ESSENTIAL HYPERTENSION: ICD-10-CM

## 2022-11-10 LAB
ALBUMIN SERPL BCP-MCNC: 4 G/DL (ref 3.5–5.2)
ALP SERPL-CCNC: 75 U/L (ref 55–135)
ALT SERPL W/O P-5'-P-CCNC: 38 U/L (ref 10–44)
ANION GAP SERPL CALC-SCNC: 7 MMOL/L (ref 8–16)
AST SERPL-CCNC: 28 U/L (ref 10–40)
BILIRUB SERPL-MCNC: 0.6 MG/DL (ref 0.1–1)
BUN SERPL-MCNC: 14 MG/DL (ref 8–23)
CALCIUM SERPL-MCNC: 9.4 MG/DL (ref 8.7–10.5)
CHLORIDE SERPL-SCNC: 106 MMOL/L (ref 95–110)
CO2 SERPL-SCNC: 28 MMOL/L (ref 23–29)
CREAT SERPL-MCNC: 0.6 MG/DL (ref 0.5–1.4)
EST. GFR  (NO RACE VARIABLE): >60 ML/MIN/1.73 M^2
GLUCOSE SERPL-MCNC: 88 MG/DL (ref 70–110)
POTASSIUM SERPL-SCNC: 4.5 MMOL/L (ref 3.5–5.1)
PROT SERPL-MCNC: 6.9 G/DL (ref 6–8.4)
SODIUM SERPL-SCNC: 141 MMOL/L (ref 136–145)

## 2022-11-10 PROCEDURE — 80053 COMPREHEN METABOLIC PANEL: CPT | Performed by: INTERNAL MEDICINE

## 2022-11-10 PROCEDURE — 36415 COLL VENOUS BLD VENIPUNCTURE: CPT | Performed by: INTERNAL MEDICINE

## 2022-11-17 ENCOUNTER — TELEPHONE (OUTPATIENT)
Dept: CARDIOLOGY | Facility: CLINIC | Age: 72
End: 2022-11-17
Payer: MEDICARE

## 2022-11-17 NOTE — TELEPHONE ENCOUNTER
Called patient, explained electrolytes within normal limits on valsartan hydrochlorothiazide combination.  She reports that blood pressures at home running in the 140 initially before taking medication and then after taking medication running in the 120s throughout the day.  Feels much better, denies any lightheadedness, feels less foggy, and overall improved.    Will follow-up in 6 months

## 2022-12-02 ENCOUNTER — PATIENT MESSAGE (OUTPATIENT)
Dept: DERMATOLOGY | Facility: CLINIC | Age: 72
End: 2022-12-02
Payer: MEDICARE

## 2022-12-02 NOTE — TELEPHONE ENCOUNTER
"Writer contacted pt and notified her that there are no sooner appts and that she is on the "wait list".  She voiced understanding.  "

## 2022-12-20 DIAGNOSIS — I10 ESSENTIAL HYPERTENSION: ICD-10-CM

## 2022-12-20 RX ORDER — VALSARTAN AND HYDROCHLOROTHIAZIDE 160; 12.5 MG/1; MG/1
1 TABLET, FILM COATED ORAL DAILY
Qty: 90 TABLET | Refills: 3 | Status: SHIPPED | OUTPATIENT
Start: 2022-12-20 | End: 2022-12-20 | Stop reason: SDUPTHER

## 2022-12-28 ENCOUNTER — OFFICE VISIT (OUTPATIENT)
Dept: DERMATOLOGY | Facility: CLINIC | Age: 72
End: 2022-12-28
Payer: MEDICARE

## 2022-12-28 VITALS — HEIGHT: 63 IN | WEIGHT: 136 LBS | BODY MASS INDEX: 24.1 KG/M2

## 2022-12-28 DIAGNOSIS — L65.8 FEMALE PATTERN ALOPECIA: Primary | ICD-10-CM

## 2022-12-28 PROCEDURE — 1126F AMNT PAIN NOTED NONE PRSNT: CPT | Mod: CPTII,S$GLB,, | Performed by: DERMATOLOGY

## 2022-12-28 PROCEDURE — 99999 PR PBB SHADOW E&M-EST. PATIENT-LVL III: CPT | Mod: PBBFAC,,, | Performed by: DERMATOLOGY

## 2022-12-28 PROCEDURE — 1101F PT FALLS ASSESS-DOCD LE1/YR: CPT | Mod: CPTII,S$GLB,, | Performed by: DERMATOLOGY

## 2022-12-28 PROCEDURE — 1126F PR PAIN SEVERITY QUANTIFIED, NO PAIN PRESENT: ICD-10-PCS | Mod: CPTII,S$GLB,, | Performed by: DERMATOLOGY

## 2022-12-28 PROCEDURE — 99999 PR PBB SHADOW E&M-EST. PATIENT-LVL III: ICD-10-PCS | Mod: PBBFAC,,, | Performed by: DERMATOLOGY

## 2022-12-28 PROCEDURE — 3008F PR BODY MASS INDEX (BMI) DOCUMENTED: ICD-10-PCS | Mod: CPTII,S$GLB,, | Performed by: DERMATOLOGY

## 2022-12-28 PROCEDURE — 99214 PR OFFICE/OUTPT VISIT, EST, LEVL IV, 30-39 MIN: ICD-10-PCS | Mod: S$GLB,,, | Performed by: DERMATOLOGY

## 2022-12-28 PROCEDURE — 4010F ACE/ARB THERAPY RXD/TAKEN: CPT | Mod: CPTII,S$GLB,, | Performed by: DERMATOLOGY

## 2022-12-28 PROCEDURE — 1159F MED LIST DOCD IN RCRD: CPT | Mod: CPTII,S$GLB,, | Performed by: DERMATOLOGY

## 2022-12-28 PROCEDURE — 1159F PR MEDICATION LIST DOCUMENTED IN MEDICAL RECORD: ICD-10-PCS | Mod: CPTII,S$GLB,, | Performed by: DERMATOLOGY

## 2022-12-28 PROCEDURE — 4010F PR ACE/ARB THEARPY RXD/TAKEN: ICD-10-PCS | Mod: CPTII,S$GLB,, | Performed by: DERMATOLOGY

## 2022-12-28 PROCEDURE — 1160F PR REVIEW ALL MEDS BY PRESCRIBER/CLIN PHARMACIST DOCUMENTED: ICD-10-PCS | Mod: CPTII,S$GLB,, | Performed by: DERMATOLOGY

## 2022-12-28 PROCEDURE — 3288F FALL RISK ASSESSMENT DOCD: CPT | Mod: CPTII,S$GLB,, | Performed by: DERMATOLOGY

## 2022-12-28 PROCEDURE — 99214 OFFICE O/P EST MOD 30 MIN: CPT | Mod: S$GLB,,, | Performed by: DERMATOLOGY

## 2022-12-28 PROCEDURE — 1101F PR PT FALLS ASSESS DOC 0-1 FALLS W/OUT INJ PAST YR: ICD-10-PCS | Mod: CPTII,S$GLB,, | Performed by: DERMATOLOGY

## 2022-12-28 PROCEDURE — 3008F BODY MASS INDEX DOCD: CPT | Mod: CPTII,S$GLB,, | Performed by: DERMATOLOGY

## 2022-12-28 PROCEDURE — 1160F RVW MEDS BY RX/DR IN RCRD: CPT | Mod: CPTII,S$GLB,, | Performed by: DERMATOLOGY

## 2022-12-28 PROCEDURE — 3288F PR FALLS RISK ASSESSMENT DOCUMENTED: ICD-10-PCS | Mod: CPTII,S$GLB,, | Performed by: DERMATOLOGY

## 2022-12-28 RX ORDER — MINOXIDIL 2.5 MG/1
2.5 TABLET ORAL DAILY
COMMUNITY
End: 2022-12-28 | Stop reason: SDUPTHER

## 2022-12-28 RX ORDER — MINOXIDIL 2.5 MG/1
TABLET ORAL
Qty: 30 TABLET | Refills: 5 | Status: SHIPPED | OUTPATIENT
Start: 2022-12-28 | End: 2023-05-18 | Stop reason: SDUPTHER

## 2022-12-28 NOTE — PROGRESS NOTES
Subjective:       Patient ID:  Eva Joseph is a 72 y.o. female who presents for   Chief Complaint   Patient presents with    Hair Loss     LOV: 9/9/21 - SD of scalp. SK, hair thinning, milia    C/o hair loss  Saw a Derm PA about 2 months ago, was given Minoxidil 2.5 mg cap - taking daily    C/o spot on hair line    Current Outpatient Medications:   ·  alendronate (FOSAMAX) 70 MG tablet, Take one tablet every 7 days., Disp: 12 tablet, Rfl: 3  ·  amino acids-minerals Tab, Take 2 tablets by mouth 3 (three) times daily., Disp: , Rfl:   ·  ascorbic acid, vitamin C, (VITAMIN C) 100 MG tablet,  Daily, 0 Refill(s), Maintenance, Disp: , Rfl:   ·  aspirin (ECOTRIN) 81 MG EC tablet, Take 1 tablet (81 mg total) by mouth once daily., Disp: , Rfl: 0  ·  betamethasone dipropionate (DIPROLENE) 0.05 % lotion, Apply topically 2 (two) times daily., Disp: 60 mL, Rfl: 2  ·  coenzyme Q10-vitamin E 100-100 mg-unit Cap, Take by mouth. 1 Capsule Oral Every day, Disp: , Rfl:   ·  fish oil-dha-epa 1,200-144-216 mg Cap, Take by mouth. 1 Capsule Oral Every day, Disp: , Rfl:   ·  glucosamine-chondroitin 500-400 mg tablet, Take 1 tablet by mouth 3 (three) times daily., Disp: , Rfl:   ·  ketoconazole (NIZORAL) 2 % shampoo, Apply topically twice a week., Disp: 120 mL, Rfl: 2  ·  minoxidiL (LONITEN) 2.5 MG tablet, Take 2.5 mg by mouth once daily., Disp: , Rfl:   ·  minoxidiL 5 % Foam, Apply topically once daily., Disp: , Rfl:   ·  multivitamin (THERAGRAN) per tablet, Take by mouth. 1 Tablet Oral Every day, Disp: , Rfl:   ·  rosuvastatin (CRESTOR) 5 MG tablet, Take 1 tablet (5 mg total) by mouth once daily., Disp: 90 tablet, Rfl: 3  ·  valsartan-hydrochlorothiazide (DIOVAN-HCT) 160-12.5 mg per tablet, Take 1 tablet by mouth once daily., Disp: 90 tablet, Rfl: 3  ·  vitamin D3-vitamin K2 1,250-200 mcg Cap,  0 Refill(s), Disp: , Rfl:        Review of Systems   Constitutional:  Negative for fever, chills, weight loss, weight gain (covid), fatigue,  night sweats and malaise.   HENT:  Negative for headaches.    Respiratory:  Negative for cough and shortness of breath.    Skin:  Positive for daily sunscreen use and activity-related sunscreen use. Negative for itching, rash, dry skin and dry lips.   Neurological:  Negative for headaches.      Objective:    Physical Exam   Constitutional: She appears well-developed and well-nourished. No distress.   HENT:   Mouth/Throat: Lips normal.    Eyes: Lids are normal.    Neurological: She is alert and oriented to person, place, and time. She is not disoriented.   Psychiatric: She has a normal mood and affect. She is not agitated.   Skin:   Areas Examined (abnormalities noted in diagram):   Scalp / Hair Palpated and Inspected  Head / Face Inspection Performed  Neck Inspection Performed  Chest / Axilla Inspection Performed  Abdomen Inspection Performed  Back Inspection Performed  RUE Inspected  LUE Inspection Performed  Nails and Digits Inspection Performed                 Diagram Legend     Erythematous scaling macule/papule c/w actinic keratosis       Vascular papule c/w angioma      Pigmented verrucoid papule/plaque c/w seborrheic keratosis      Yellow umbilicated papule c/w sebaceous hyperplasia      Irregularly shaped tan macule c/w lentigo     1-2 mm smooth white papules consistent with Milia      Movable subcutaneous cyst with punctum c/w epidermal inclusion cyst      Subcutaneous movable cyst c/w pilar cyst      Firm pink to brown papule c/w dermatofibroma      Pedunculated fleshy papule(s) c/w skin tag(s)      Evenly pigmented macule c/w junctional nevus     Mildly variegated pigmented, slightly irregular-bordered macule c/w mildly atypical nevus      Flesh colored to evenly pigmented papule c/w intradermal nevus       Pink pearly papule/plaque c/w basal cell carcinoma      Erythematous hyperkeratotic cursted plaque c/w SCC      Surgical scar with no sign of skin cancer recurrence      Open and closed comedones       Inflammatory papules and pustules      Verrucoid papule consistent consistent with wart     Erythematous eczematous patches and plaques     Dystrophic onycholytic nail with subungual debris c/w onychomycosis     Umbilicated papule    Erythematous-base heme-crusted tan verrucoid plaque consistent with inflamed seborrheic keratosis     Erythematous Silvery Scaling Plaque c/w Psoriasis     See annotation      Assessment / Plan:        Female pattern alopecia  -     minoxidiL (LONITEN) 2.5 MG tablet; Take 1/4 tablet QHS  Dispense: 30 tablet; Refill: 5    Keto shampoo and betamethasone lotion PRN itch/scale  Currently on 2 capsules of compounded minoxidil 0.25mg so daily dose of 0.5mg  Ok to use 1/4 tablet of 2.5mg so daily dose of 0.625mg  Tolerating well, no effect on BP, no ankle swelling    Discussed side affects of  minoxidil:  Side effects that you should report to your doctor or health care professional as soon as possible:  chest pain, fast or irregular heartbeat, palpitations  difficulty breathing  dizziness or fainting spells  redness, blistering, peeling or loosening of the skin, including inside the mouth  skin rash or itching  stiff or swollen joints  sudden weight gain  swelling of the feet or legs  unusual weakness  Side effects that usually do not require medical attention (report to your doctor or health care professional if they continue or are bothersome):  headache  unusual hair growth, on the face, arm, and back    Will add finasteride 5mg daily if further treatment needed.    Discussed with patient that there are multiple over the counter hair supplements, few of which have proven efficacy in controlled clinical trials. However, anecdotal reports have indicated a benefit for these vitamins. Review active ingredients, allergies, and proper use on line. The patient can elect to take at his/her discretion.  NUTRAFOL (nut[employer]fol[employer]com)  VIVISCAL ([employer]cal[employer])  UNTANGLED ([employer])            No follow-ups on file.

## 2022-12-28 NOTE — PATIENT INSTRUCTIONS
Discussed with patient that there are multiple over the counter hair supplements, few of which have proven efficacy in controlled clinical trials. However, anecdotal reports have indicated a benefit for these vitamins. Review active ingredients, allergies, and proper use on line. The patient can elect to take at his/her discretion.  NUTRAFOL ([email or web address][employer]com)  VIVISCAL (viviscal[employer])  UNTANGLED ([employer])

## 2023-01-16 NOTE — Clinical Note
- please schedule with Dr. Newman- RTC 1 year
Parish Wagoner)  Otolaryngology  [address], [address]  [place] [address]  Phone: [phone]  Fax: [phone]  Follow Up Time: 1-3 Days    Bryant Gallegos  OTOLARYNGOLOGY  [address] [place] [address], [address]  [place] [address]  Phone: [phone]  Fax: [phone]  Follow Up Time: 1-3 Days

## 2023-02-01 ENCOUNTER — PATIENT OUTREACH (OUTPATIENT)
Dept: ADMINISTRATIVE | Facility: HOSPITAL | Age: 73
End: 2023-02-01
Payer: MEDICARE

## 2023-02-01 DIAGNOSIS — Z12.11 SCREENING FOR COLON CANCER: Primary | ICD-10-CM

## 2023-02-01 NOTE — PROGRESS NOTES
Population Health chart review. 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 Spoke with pt about yuan [employer], she said she would do the [employer] again. Sending order now.

## 2023-02-21 LAB — NONINV COLON CA DNA+OCC BLD SCRN STL QL: NEGATIVE

## 2023-02-27 ENCOUNTER — OFFICE VISIT (OUTPATIENT)
Dept: FAMILY MEDICINE | Facility: CLINIC | Age: 73
End: 2023-02-27
Payer: MEDICARE

## 2023-02-27 VITALS
HEIGHT: 63 IN | HEART RATE: 62 BPM | DIASTOLIC BLOOD PRESSURE: 68 MMHG | WEIGHT: 138 LBS | TEMPERATURE: 98 F | OXYGEN SATURATION: 99 % | SYSTOLIC BLOOD PRESSURE: 120 MMHG | BODY MASS INDEX: 24.45 KG/M2 | RESPIRATION RATE: 16 BRPM

## 2023-02-27 DIAGNOSIS — Z85.3 HISTORY OF BREAST CANCER: ICD-10-CM

## 2023-02-27 DIAGNOSIS — E21.0 PRIMARY HYPERPARATHYROIDISM: ICD-10-CM

## 2023-02-27 DIAGNOSIS — R23.8 SCALP IRRITATION: ICD-10-CM

## 2023-02-27 DIAGNOSIS — I70.0 AORTIC ATHEROSCLEROSIS: ICD-10-CM

## 2023-02-27 DIAGNOSIS — I10 ESSENTIAL HYPERTENSION: ICD-10-CM

## 2023-02-27 DIAGNOSIS — Z00.00 ANNUAL PHYSICAL EXAM: Primary | ICD-10-CM

## 2023-02-27 PROCEDURE — 99999 PR PBB SHADOW E&M-EST. PATIENT-LVL IV: CPT | Mod: PBBFAC,,, | Performed by: FAMILY MEDICINE

## 2023-02-27 PROCEDURE — 1160F RVW MEDS BY RX/DR IN RCRD: CPT | Mod: CPTII,S$GLB,, | Performed by: FAMILY MEDICINE

## 2023-02-27 PROCEDURE — 1126F AMNT PAIN NOTED NONE PRSNT: CPT | Mod: CPTII,S$GLB,, | Performed by: FAMILY MEDICINE

## 2023-02-27 PROCEDURE — 1159F PR MEDICATION LIST DOCUMENTED IN MEDICAL RECORD: ICD-10-PCS | Mod: CPTII,S$GLB,, | Performed by: FAMILY MEDICINE

## 2023-02-27 PROCEDURE — 1159F MED LIST DOCD IN RCRD: CPT | Mod: CPTII,S$GLB,, | Performed by: FAMILY MEDICINE

## 2023-02-27 PROCEDURE — 3078F DIAST BP <80 MM HG: CPT | Mod: CPTII,S$GLB,, | Performed by: FAMILY MEDICINE

## 2023-02-27 PROCEDURE — 99397 PR PREVENTIVE VISIT,EST,65 & OVER: ICD-10-PCS | Mod: S$GLB,,, | Performed by: FAMILY MEDICINE

## 2023-02-27 PROCEDURE — 3008F PR BODY MASS INDEX (BMI) DOCUMENTED: ICD-10-PCS | Mod: CPTII,S$GLB,, | Performed by: FAMILY MEDICINE

## 2023-02-27 PROCEDURE — 1101F PT FALLS ASSESS-DOCD LE1/YR: CPT | Mod: CPTII,S$GLB,, | Performed by: FAMILY MEDICINE

## 2023-02-27 PROCEDURE — 1101F PR PT FALLS ASSESS DOC 0-1 FALLS W/OUT INJ PAST YR: ICD-10-PCS | Mod: CPTII,S$GLB,, | Performed by: FAMILY MEDICINE

## 2023-02-27 PROCEDURE — 3008F BODY MASS INDEX DOCD: CPT | Mod: CPTII,S$GLB,, | Performed by: FAMILY MEDICINE

## 2023-02-27 PROCEDURE — 1126F PR PAIN SEVERITY QUANTIFIED, NO PAIN PRESENT: ICD-10-PCS | Mod: CPTII,S$GLB,, | Performed by: FAMILY MEDICINE

## 2023-02-27 PROCEDURE — 99397 PER PM REEVAL EST PAT 65+ YR: CPT | Mod: S$GLB,,, | Performed by: FAMILY MEDICINE

## 2023-02-27 PROCEDURE — 3288F PR FALLS RISK ASSESSMENT DOCUMENTED: ICD-10-PCS | Mod: CPTII,S$GLB,, | Performed by: FAMILY MEDICINE

## 2023-02-27 PROCEDURE — 3074F PR MOST RECENT SYSTOLIC BLOOD PRESSURE < 130 MM HG: ICD-10-PCS | Mod: CPTII,S$GLB,, | Performed by: FAMILY MEDICINE

## 2023-02-27 PROCEDURE — 99999 PR PBB SHADOW E&M-EST. PATIENT-LVL IV: ICD-10-PCS | Mod: PBBFAC,,, | Performed by: FAMILY MEDICINE

## 2023-02-27 PROCEDURE — 3288F FALL RISK ASSESSMENT DOCD: CPT | Mod: CPTII,S$GLB,, | Performed by: FAMILY MEDICINE

## 2023-02-27 PROCEDURE — 1160F PR REVIEW ALL MEDS BY PRESCRIBER/CLIN PHARMACIST DOCUMENTED: ICD-10-PCS | Mod: CPTII,S$GLB,, | Performed by: FAMILY MEDICINE

## 2023-02-27 PROCEDURE — 3074F SYST BP LT 130 MM HG: CPT | Mod: CPTII,S$GLB,, | Performed by: FAMILY MEDICINE

## 2023-02-27 PROCEDURE — 3078F PR MOST RECENT DIASTOLIC BLOOD PRESSURE < 80 MM HG: ICD-10-PCS | Mod: CPTII,S$GLB,, | Performed by: FAMILY MEDICINE

## 2023-02-27 RX ORDER — KETOCONAZOLE 20 MG/ML
SHAMPOO, SUSPENSION TOPICAL
Qty: 120 ML | Refills: 2 | Status: SHIPPED | OUTPATIENT
Start: 2023-02-27

## 2023-02-27 NOTE — PROGRESS NOTES
[facility] - Clinic Note    Subjective      Ms. Joseph is a 73 y.o. female who presents to clinic for Annual Exam    Blood pressure has been controlled.    [facility] Eva has a past medical history of Breast cancer (2000), Depression, Essential hypertension (5/18/2022), Hypertriglyceridemia (7/21/2019), Nephrolithiasis (2/5/2015), and Osteopenia.   PSX Eva has a past surgical history that includes Breast lumpectomy (2000); Hysterectomy (1998); Bilateral oophorectomy; Femoral hernia (2010); Mastectomy (2013 ); Tonsillectomy; Adenoidectomy; Hemorrhoid surgery; Breast reconstruction (Bilateral, 2013); and Oophorectomy.    Eva's family history includes Breast cancer in her maternal grandmother and mother; Cancer in her maternal aunt and maternal grandfather; Heart disease (age of onset: 56) in her father; Hyperlipidemia in her son; Hypertension in her father; No Known Problems in her brother, daughter, daughter, sister, and sister; Ovarian cancer in her maternal aunt; Parkinsonism in her paternal grandfather; Skin cancer in her brother; Stroke in her paternal grandmother.    Eva reports that she has never smoked. She has never used smokeless tobacco. She reports current alcohol use of about 1.0 standard drink per week. She reports that she does not use drugs.   BILLY Velasquez is allergic to sulfa (sulfonamide antibiotics).   MED Eva has a current medication list which includes the following prescription(s): alendronate, amino acids-minerals, ascorbic acid (vitamin c), aspirin, betamethasone dipropionate, coenzyme q10-vitamin e, fish oil-dha-epa, glucosamine-chondroitin, minoxidil, multivitamin, rosuvastatin, valsartan-hydrochlorothiazide, vitamin d3-vitamin k2, ketoconazole, and minoxidil.     Review of Systems   Constitutional:  Negative for chills and fever.   HENT:  Negative for congestion and rhinorrhea.    Eyes:  Negative for visual disturbance.   Respiratory:  Negative for cough and  "shortness of breath.    Cardiovascular:  Negative for chest pain.   Gastrointestinal:  Negative for abdominal pain, constipation, diarrhea, nausea and vomiting.   Genitourinary:  Negative for dysuria.   Musculoskeletal:  Negative for myalgias.   Skin:  Negative for rash.   Neurological:  Negative for weakness and headaches.   Objective     /68 (BP Location: Right arm, Patient Position: Sitting, BP Method: Small (Manual))   Pulse 62   Temp 98 °F (36.7 °C) (Temporal)   Resp 16   Ht 5' 3" (1.6 m)   Wt 62.6 kg (138 lb)   LMP  (LMP Unknown)   SpO2 99%   BMI 24.45 kg/m²     Physical Exam  Vitals and nursing note reviewed.   Constitutional:       General: She is not in acute distress.     Appearance: Normal appearance. She is well-developed. She is not diaphoretic.   HENT:      Head: Normocephalic and atraumatic.      Right Ear: External ear normal.      Left Ear: External ear normal.   Eyes:      General:         Right eye: No discharge.         Left eye: No discharge.   Cardiovascular:      Rate and Rhythm: Normal rate and regular rhythm.      Heart sounds: Normal heart sounds.   Pulmonary:      Effort: Pulmonary effort is normal.      Breath sounds: Normal breath sounds. No wheezing or rales.   Skin:     General: Skin is warm and dry.   Neurological:      Mental Status: She is alert and oriented to person, place, and time. Mental status is at baseline.   Psychiatric:         Mood and Affect: Mood normal.         Behavior: Behavior normal.         Thought Content: Thought content normal.         Judgment: Judgment normal.      Assessment/Plan     1. Annual physical exam        2. Essential hypertension        3. Aortic atherosclerosis        4. Primary hyperparathyroidism        5. History of breast cancer        6. Scalp irritation  ketoconazole (NIZORAL) 2 % shampoo        Overall doing well.  Reviewed previous lab work.  Refilled medication as above.  Keep follow-up with specialist.    1. Annual physical " exam    2. Essential hypertension    3. Aortic atherosclerosis  Overview:  Noted on CT abd from 11/4/2010      4. Primary hyperparathyroidism  Assessment & Plan:  PTH wnl. Ca normal.      5. History of breast cancer    6. Scalp irritation  -     ketoconazole (NIZORAL) 2 % shampoo; Apply topically twice a week.  Dispense: 120 mL; Refill: 2          Future Appointments   Date Time Provider Department Center   3/27/2023 10:00 AM Fani Frazier MD [facility] DERM [place]   10/10/2023  1:00 PM [facility] DEXA1 [facility] [place]   10/10/2023  1:20 PM LAB, [facility] [place]   10/17/2023  2:00 PM LEYLA Ghotra PA-C [facility] ENDOCRN [place]         Micha Marshall MD  Family Medicine  [facility]

## 2023-03-27 ENCOUNTER — OFFICE VISIT (OUTPATIENT)
Dept: DERMATOLOGY | Facility: CLINIC | Age: 73
End: 2023-03-27
Payer: MEDICARE

## 2023-03-27 VITALS — HEIGHT: 63 IN | WEIGHT: 134 LBS | BODY MASS INDEX: 23.74 KG/M2

## 2023-03-27 DIAGNOSIS — L73.8 SEBACEOUS HYPERPLASIA OF FACE: ICD-10-CM

## 2023-03-27 DIAGNOSIS — B07.8 COMMON WART: Primary | ICD-10-CM

## 2023-03-27 DIAGNOSIS — L81.4 SOLAR LENTIGO: ICD-10-CM

## 2023-03-27 DIAGNOSIS — D22.9 MULTIPLE BENIGN NEVI: ICD-10-CM

## 2023-03-27 DIAGNOSIS — L82.1 SEBORRHEIC KERATOSES: ICD-10-CM

## 2023-03-27 DIAGNOSIS — L65.8 FEMALE PATTERN ALOPECIA: ICD-10-CM

## 2023-03-27 PROCEDURE — 3008F PR BODY MASS INDEX (BMI) DOCUMENTED: ICD-10-PCS | Mod: CPTII,S$GLB,, | Performed by: DERMATOLOGY

## 2023-03-27 PROCEDURE — 99213 PR OFFICE/OUTPT VISIT, EST, LEVL III, 20-29 MIN: ICD-10-PCS | Mod: 25,S$GLB,, | Performed by: DERMATOLOGY

## 2023-03-27 PROCEDURE — 1159F MED LIST DOCD IN RCRD: CPT | Mod: CPTII,S$GLB,, | Performed by: DERMATOLOGY

## 2023-03-27 PROCEDURE — 3288F PR FALLS RISK ASSESSMENT DOCUMENTED: ICD-10-PCS | Mod: CPTII,S$GLB,, | Performed by: DERMATOLOGY

## 2023-03-27 PROCEDURE — 1160F RVW MEDS BY RX/DR IN RCRD: CPT | Mod: CPTII,S$GLB,, | Performed by: DERMATOLOGY

## 2023-03-27 PROCEDURE — 1159F PR MEDICATION LIST DOCUMENTED IN MEDICAL RECORD: ICD-10-PCS | Mod: CPTII,S$GLB,, | Performed by: DERMATOLOGY

## 2023-03-27 PROCEDURE — 99999 PR PBB SHADOW E&M-EST. PATIENT-LVL III: CPT | Mod: PBBFAC,,, | Performed by: DERMATOLOGY

## 2023-03-27 PROCEDURE — 1101F PR PT FALLS ASSESS DOC 0-1 FALLS W/OUT INJ PAST YR: ICD-10-PCS | Mod: CPTII,S$GLB,, | Performed by: DERMATOLOGY

## 2023-03-27 PROCEDURE — 1160F PR REVIEW ALL MEDS BY PRESCRIBER/CLIN PHARMACIST DOCUMENTED: ICD-10-PCS | Mod: CPTII,S$GLB,, | Performed by: DERMATOLOGY

## 2023-03-27 PROCEDURE — 17110 DESTRUCTION B9 LES UP TO 14: CPT | Mod: S$GLB,,, | Performed by: DERMATOLOGY

## 2023-03-27 PROCEDURE — 3288F FALL RISK ASSESSMENT DOCD: CPT | Mod: CPTII,S$GLB,, | Performed by: DERMATOLOGY

## 2023-03-27 PROCEDURE — 17110 PR DESTRUCTION BENIGN LESIONS UP TO 14: ICD-10-PCS | Mod: S$GLB,,, | Performed by: DERMATOLOGY

## 2023-03-27 PROCEDURE — 3008F BODY MASS INDEX DOCD: CPT | Mod: CPTII,S$GLB,, | Performed by: DERMATOLOGY

## 2023-03-27 PROCEDURE — 99999 PR PBB SHADOW E&M-EST. PATIENT-LVL III: ICD-10-PCS | Mod: PBBFAC,,, | Performed by: DERMATOLOGY

## 2023-03-27 PROCEDURE — 1101F PT FALLS ASSESS-DOCD LE1/YR: CPT | Mod: CPTII,S$GLB,, | Performed by: DERMATOLOGY

## 2023-03-27 PROCEDURE — 99213 OFFICE O/P EST LOW 20 MIN: CPT | Mod: 25,S$GLB,, | Performed by: DERMATOLOGY

## 2023-03-27 NOTE — PROGRESS NOTES
Subjective:       Patient ID:  Eva Joseph is a 73 y.o. female who presents for   Chief Complaint   Patient presents with    Skin Check     TBSC    Spot     Left neck, left leg     LOV 12/28/22- Alopecia    Patient here today for TBSC  C/O spot to left neck, very itchy  C/O spot left leg, no symptoms    Derm Hx:  Denies Phx of NMSC  Denies Fhx of MM    Current Outpatient Medications:   ·  alendronate (FOSAMAX) 70 MG tablet, Take one tablet every 7 days., Disp: 12 tablet, Rfl: 3  ·  amino acids-minerals Tab, Take 2 tablets by mouth 3 (three) times daily., Disp: , Rfl:   ·  ascorbic acid, vitamin C, (VITAMIN C) 100 MG tablet,  Daily, 0 Refill(s), Maintenance, Disp: , Rfl:   ·  aspirin (ECOTRIN) 81 MG EC tablet, Take 1 tablet (81 mg total) by mouth once daily., Disp: , Rfl: 0  ·  betamethasone dipropionate (DIPROLENE) 0.05 % lotion, Apply topically 2 (two) times daily., Disp: 60 mL, Rfl: 2  ·  coenzyme Q10-vitamin E 100-100 mg-unit Cap, Take by mouth. 1 Capsule Oral Every day, Disp: , Rfl:   ·  fish oil-dha-epa 1,200-144-216 mg Cap, Take by mouth. 1 Capsule Oral Every day, Disp: , Rfl:   ·  glucosamine-chondroitin 500-400 mg tablet, Take 1 tablet by mouth 3 (three) times daily., Disp: , Rfl:   ·  ketoconazole (NIZORAL) 2 % shampoo, Apply topically twice a week., Disp: 120 mL, Rfl: 2  ·  minoxidiL (LONITEN) 2.5 MG tablet, Take 1/4 tablet QHS, Disp: 30 tablet, Rfl: 5  ·  multivitamin (THERAGRAN) per tablet, Take by mouth. 1 Tablet Oral Every day, Disp: , Rfl:   ·  rosuvastatin (CRESTOR) 5 MG tablet, Take 1 tablet (5 mg total) by mouth once daily., Disp: 90 tablet, Rfl: 3  ·  valsartan-hydrochlorothiazide (DIOVAN-HCT) 160-12.5 mg per tablet, Take 1 tablet by mouth once daily., Disp: 90 tablet, Rfl: 3  ·  vitamin D3-vitamin K2 1,250-200 mcg Cap,  0 Refill(s), Disp: , Rfl:   .    Review of Systems   Constitutional:  Negative for fever, chills, weight loss, weight gain (covid), fatigue, night sweats and malaise.    HENT:  Negative for headaches.    Respiratory:  Negative for cough and shortness of breath.    Skin:  Positive for daily sunscreen use and activity-related sunscreen use. Negative for itching, rash, dry skin and dry lips.   Neurological:  Negative for headaches.      Objective:    Physical Exam   Constitutional: She appears well-developed and well-nourished. No distress.   HENT:   Mouth/Throat: Lips normal.    Eyes: Lids are normal.    Neurological: She is alert and oriented to person, place, and time. She is not disoriented.   Psychiatric: She has a normal mood and affect. She is not agitated.   Skin:   Areas Examined (abnormalities noted in diagram):   Scalp / Hair Palpated and Inspected  Head / Face Inspection Performed  Neck Inspection Performed  Chest / Axilla Inspection Performed  Abdomen Inspection Performed  Genitals / Buttocks / Groin Inspection Performed  Back Inspection Performed  RUE Inspected  LUE Inspection Performed  RLE Inspected  LLE Inspection Performed  Nails and Digits Inspection Performed                 Diagram Legend     Erythematous scaling macule/papule c/w actinic keratosis       Vascular papule c/w angioma      Pigmented verrucoid papule/plaque c/w seborrheic keratosis      Yellow umbilicated papule c/w sebaceous hyperplasia      Irregularly shaped tan macule c/w lentigo     1-2 mm smooth white papules consistent with Milia      Movable subcutaneous cyst with punctum c/w epidermal inclusion cyst      Subcutaneous movable cyst c/w pilar cyst      Firm pink to brown papule c/w dermatofibroma      Pedunculated fleshy papule(s) c/w skin tag(s)      Evenly pigmented macule c/w junctional nevus     Mildly variegated pigmented, slightly irregular-bordered macule c/w mildly atypical nevus      Flesh colored to evenly pigmented papule c/w intradermal nevus       Pink pearly papule/plaque c/w basal cell carcinoma      Erythematous hyperkeratotic cursted plaque c/w SCC      Surgical scar with no  sign of skin cancer recurrence      Open and closed comedones      Inflammatory papules and pustules      Verrucoid papule consistent consistent with wart     Erythematous eczematous patches and plaques     Dystrophic onycholytic nail with subungual debris c/w onychomycosis     Umbilicated papule    Erythematous-base heme-crusted tan verrucoid plaque consistent with inflamed seborrheic keratosis     Erythematous Silvery Scaling Plaque c/w Psoriasis     See annotation      Assessment / Plan:        Common wart left neck  Procedure note for destruction via shave debulking:    Discussed risks of procedure including but not limited to infection, persistence of lesion, recurrence of lesion, and scar. Verbal consent obtained. Area cleaned with alcohol and anesthetized with 1% lidocaine with epinephrine. Lesion(s) shaved with sharp razor then base destroyed with hyfrecation. No complications.    Seborrheic keratoses  These are benign inherited growths without a malignant potential. Reassurance given to patient. No treatment is necessary.     Sebaceous hyperplasia of face  This is a common condition representing benign enlargement of the sebaceous lobule. It typically occurs in adulthood. Reassurance given to patient.     Solar lentigo  This is a benign hyperpigmented sun induced lesion. Daily sun protection will reduce the number of new lesions. Treatment of these benign lesions are considered cosmetic.    Multiple benign nevi  Careful dermoscopy evaluation of nevi performed with none identified as needing biopsy today  Monitor for new mole or moles that are becoming bigger, darker, irritated, or developing irregular borders.     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 Mineral sunscreen ingredients preferred (Zinc +/- Titanium) and can be  found OTC.   Recommend Elta MD for daily use on face and neck.  Patient encouraged to wear hat for all outdoor exposure.   Also discussed sun avoidance and use of protective clothing.             Follow up in about 1 year (around 3/27/2024), or if symptoms worsen or fail to improve.

## 2023-05-17 ENCOUNTER — PATIENT MESSAGE (OUTPATIENT)
Dept: DERMATOLOGY | Facility: CLINIC | Age: 73
End: 2023-05-17
Payer: MEDICARE

## 2023-05-17 DIAGNOSIS — L65.8 FEMALE PATTERN ALOPECIA: ICD-10-CM

## 2023-05-18 DIAGNOSIS — L65.8 FEMALE PATTERN ALOPECIA: ICD-10-CM

## 2023-05-18 RX ORDER — MINOXIDIL 2.5 MG/1
TABLET ORAL
Qty: 45 TABLET | Refills: 3 | Status: SHIPPED | OUTPATIENT
Start: 2023-05-18

## 2023-06-09 ENCOUNTER — HOSPITAL ENCOUNTER (OUTPATIENT)
Dept: RADIOLOGY | Facility: HOSPITAL | Age: 73
Discharge: HOME | End: 2023-06-09
Attending: FAMILY MEDICINE
Payer: MEDICARE

## 2023-06-09 ENCOUNTER — PATIENT MESSAGE (OUTPATIENT)
Dept: FAMILY MEDICINE | Facility: CLINIC | Age: 73
End: 2023-06-09
Payer: MEDICARE

## 2023-06-09 DIAGNOSIS — R05.9 COUGH, UNSPECIFIED TYPE: ICD-10-CM

## 2023-06-13 ENCOUNTER — TELEPHONE (OUTPATIENT)
Dept: FAMILY MEDICINE | Facility: CLINIC | Age: 73
End: 2023-06-13
Payer: MEDICARE

## 2023-07-26 ENCOUNTER — OFFICE VISIT (OUTPATIENT)
Dept: CARDIOLOGY | Facility: CLINIC | Age: 73
End: 2023-07-26
Payer: MEDICARE

## 2023-07-26 VITALS
HEIGHT: 63 IN | OXYGEN SATURATION: 98 % | WEIGHT: 132.5 LBS | BODY MASS INDEX: 23.48 KG/M2 | DIASTOLIC BLOOD PRESSURE: 60 MMHG | HEART RATE: 55 BPM | SYSTOLIC BLOOD PRESSURE: 120 MMHG

## 2023-07-26 DIAGNOSIS — R03.0 ELEVATED BLOOD PRESSURE READING: ICD-10-CM

## 2023-07-26 DIAGNOSIS — E78.1 HYPERTRIGLYCERIDEMIA: ICD-10-CM

## 2023-07-26 DIAGNOSIS — I10 ESSENTIAL HYPERTENSION: Primary | ICD-10-CM

## 2023-07-26 DIAGNOSIS — R93.1 AGATSTON CORONARY ARTERY CALCIUM SCORE BETWEEN 100 AND 199: ICD-10-CM

## 2023-07-26 PROCEDURE — 1101F PT FALLS ASSESS-DOCD LE1/YR: CPT | Mod: CPTII,GC,S$GLB, | Performed by: STUDENT IN AN ORGANIZED HEALTH CARE EDUCATION/TRAINING PROGRAM

## 2023-07-26 PROCEDURE — 99999 PR PBB SHADOW E&M-EST. PATIENT-LVL IV: CPT | Mod: PBBFAC,GC,, | Performed by: STUDENT IN AN ORGANIZED HEALTH CARE EDUCATION/TRAINING PROGRAM

## 2023-07-26 PROCEDURE — 3288F PR FALLS RISK ASSESSMENT DOCUMENTED: ICD-10-PCS | Mod: CPTII,GC,S$GLB, | Performed by: STUDENT IN AN ORGANIZED HEALTH CARE EDUCATION/TRAINING PROGRAM

## 2023-07-26 PROCEDURE — 1159F PR MEDICATION LIST DOCUMENTED IN MEDICAL RECORD: ICD-10-PCS | Mod: CPTII,GC,S$GLB, | Performed by: STUDENT IN AN ORGANIZED HEALTH CARE EDUCATION/TRAINING PROGRAM

## 2023-07-26 PROCEDURE — 1126F PR PAIN SEVERITY QUANTIFIED, NO PAIN PRESENT: ICD-10-PCS | Mod: CPTII,GC,S$GLB, | Performed by: STUDENT IN AN ORGANIZED HEALTH CARE EDUCATION/TRAINING PROGRAM

## 2023-07-26 PROCEDURE — 3074F PR MOST RECENT SYSTOLIC BLOOD PRESSURE < 130 MM HG: ICD-10-PCS | Mod: CPTII,GC,S$GLB, | Performed by: STUDENT IN AN ORGANIZED HEALTH CARE EDUCATION/TRAINING PROGRAM

## 2023-07-26 PROCEDURE — 3078F DIAST BP <80 MM HG: CPT | Mod: CPTII,GC,S$GLB, | Performed by: STUDENT IN AN ORGANIZED HEALTH CARE EDUCATION/TRAINING PROGRAM

## 2023-07-26 PROCEDURE — 99214 OFFICE O/P EST MOD 30 MIN: CPT | Mod: GC,S$GLB,, | Performed by: STUDENT IN AN ORGANIZED HEALTH CARE EDUCATION/TRAINING PROGRAM

## 2023-07-26 PROCEDURE — 1126F AMNT PAIN NOTED NONE PRSNT: CPT | Mod: CPTII,GC,S$GLB, | Performed by: STUDENT IN AN ORGANIZED HEALTH CARE EDUCATION/TRAINING PROGRAM

## 2023-07-26 PROCEDURE — 1101F PR PT FALLS ASSESS DOC 0-1 FALLS W/OUT INJ PAST YR: ICD-10-PCS | Mod: CPTII,GC,S$GLB, | Performed by: STUDENT IN AN ORGANIZED HEALTH CARE EDUCATION/TRAINING PROGRAM

## 2023-07-26 PROCEDURE — 99214 PR OFFICE/OUTPT VISIT, EST, LEVL IV, 30-39 MIN: ICD-10-PCS | Mod: GC,S$GLB,, | Performed by: STUDENT IN AN ORGANIZED HEALTH CARE EDUCATION/TRAINING PROGRAM

## 2023-07-26 PROCEDURE — 3074F SYST BP LT 130 MM HG: CPT | Mod: CPTII,GC,S$GLB, | Performed by: STUDENT IN AN ORGANIZED HEALTH CARE EDUCATION/TRAINING PROGRAM

## 2023-07-26 PROCEDURE — 1159F MED LIST DOCD IN RCRD: CPT | Mod: CPTII,GC,S$GLB, | Performed by: STUDENT IN AN ORGANIZED HEALTH CARE EDUCATION/TRAINING PROGRAM

## 2023-07-26 PROCEDURE — 3008F PR BODY MASS INDEX (BMI) DOCUMENTED: ICD-10-PCS | Mod: CPTII,GC,S$GLB, | Performed by: STUDENT IN AN ORGANIZED HEALTH CARE EDUCATION/TRAINING PROGRAM

## 2023-07-26 PROCEDURE — 3288F FALL RISK ASSESSMENT DOCD: CPT | Mod: CPTII,GC,S$GLB, | Performed by: STUDENT IN AN ORGANIZED HEALTH CARE EDUCATION/TRAINING PROGRAM

## 2023-07-26 PROCEDURE — 99999 PR PBB SHADOW E&M-EST. PATIENT-LVL IV: ICD-10-PCS | Mod: PBBFAC,GC,, | Performed by: STUDENT IN AN ORGANIZED HEALTH CARE EDUCATION/TRAINING PROGRAM

## 2023-07-26 PROCEDURE — 3078F PR MOST RECENT DIASTOLIC BLOOD PRESSURE < 80 MM HG: ICD-10-PCS | Mod: CPTII,GC,S$GLB, | Performed by: STUDENT IN AN ORGANIZED HEALTH CARE EDUCATION/TRAINING PROGRAM

## 2023-07-26 PROCEDURE — 3008F BODY MASS INDEX DOCD: CPT | Mod: CPTII,GC,S$GLB, | Performed by: STUDENT IN AN ORGANIZED HEALTH CARE EDUCATION/TRAINING PROGRAM

## 2023-07-26 RX ORDER — VALSARTAN AND HYDROCHLOROTHIAZIDE 160; 12.5 MG/1; MG/1
1 TABLET, FILM COATED ORAL DAILY
Qty: 90 TABLET | Refills: 3 | Status: SHIPPED | OUTPATIENT
Start: 2023-07-26 | End: 2024-03-20 | Stop reason: SDUPTHER

## 2023-07-26 RX ORDER — ROSUVASTATIN CALCIUM 5 MG/1
5 TABLET, COATED ORAL DAILY
Qty: 90 TABLET | Refills: 3 | Status: SHIPPED | OUTPATIENT
Start: 2023-07-26 | End: 2024-03-20 | Stop reason: SDUPTHER

## 2023-07-26 NOTE — PROGRESS NOTES
PCP - Micha Marshall MD      Subjective:   Eva Joseph is a 73 y.o. lady with hypertension, hyperlipidemia, family history of atherosclerosis, aortic atherosclerosis, coronary artery disease by calcification who presents for regular follow up.  Patient was last seen in October of 2022 by Dr. Gomez for uncontrolled hypertension.  At that time losartan was discontinued in patient was started on valsartan/HCTZ.  Patient reports tolerating medication without any side effects.  Blood pressure has been controlled in averaging systolics of 120s.  Patient also reports compliance with Crestor for hyperlipidemia, and aspirin for elevated calcium score.    Patient reports increased fatigue that is intermittent, reports having to take more naps than she used to previously.  Patient sleeping on average 7 hours a night, denies snoring, PND or orthopnea.  Patient is very active and plays pickleball as well as line dancing.  She reports some mild trace lower extremity edema after playing pickleball.  Denies any chest pain or shortness for breath with exertion.        History:     Social History     Tobacco Use    Smoking status: Never    Smokeless tobacco: Never   Substance Use Topics    Alcohol use: Yes     Alcohol/week: 1.0 standard drink     Types: 1 Glasses of wine per week     Comment: Occasionally     Family History   Problem Relation Age of Onset    Breast cancer Mother         Breast cancer-75yrs    Hypertension Father     Heart disease Father 56        MI    Cancer Maternal Aunt         ovarian    Ovarian cancer Maternal Aunt     Cancer Maternal Grandfather         testicular / prostate    Stroke Paternal Grandmother     Breast cancer Maternal Grandmother         Breast cancer-42yrs    Parkinsonism Paternal Grandfather     No Known Problems Sister     Skin cancer Brother     Hyperlipidemia Son         As a child    No Known Problems Daughter     No Known Problems Daughter     No Known Problems Sister      No Known Problems Brother     Amblyopia Neg Hx     Blindness Neg Hx     Cataracts Neg Hx     Diabetes Neg Hx     Glaucoma Neg Hx     Macular degeneration Neg Hx     Retinal detachment Neg Hx     Strabismus Neg Hx     Thyroid disease Neg Hx     Colon cancer Neg Hx     Melanoma Neg Hx        Meds:     Review of patient's allergies indicates:   Allergen Reactions    Sulfa (sulfonamide antibiotics) Other (See Comments)     Other reaction(s): Swelling       Current Outpatient Medications:     alendronate (FOSAMAX) 70 MG tablet, Take one tablet every 7 days., Disp: 12 tablet, Rfl: 3    amino acids-minerals Tab, Take 2 tablets by mouth 3 (three) times daily., Disp: , Rfl:     ascorbic acid, vitamin C, (VITAMIN C) 100 MG tablet,  Daily, 0 Refill(s), Maintenance, Disp: , Rfl:     aspirin (ECOTRIN) 81 MG EC tablet, Take 1 tablet (81 mg total) by mouth once daily., Disp: , Rfl: 0    betamethasone dipropionate (DIPROLENE) 0.05 % lotion, Apply topically 2 (two) times daily., Disp: 60 mL, Rfl: 2    coenzyme Q10-vitamin E 100-100 mg-unit Cap, Take by mouth. 1 Capsule Oral Every day, Disp: , Rfl:     fish oil-dha-epa 1,200-144-216 mg Cap, Take by mouth. 1 Capsule Oral Every day, Disp: , Rfl:     glucosamine-chondroitin 500-400 mg tablet, Take 1 tablet by mouth 3 (three) times daily., Disp: , Rfl:     ketoconazole (NIZORAL) 2 % shampoo, Apply topically twice a week., Disp: 120 mL, Rfl: 2    minoxidiL (LONITEN) 2.5 MG tablet, Take 1/2 tablet PO QHS, Disp: 45 tablet, Rfl: 3    multivitamin (THERAGRAN) per tablet, Take by mouth. 1 Tablet Oral Every day, Disp: , Rfl:     rosuvastatin (CRESTOR) 5 MG tablet, Take 1 tablet (5 mg total) by mouth once daily., Disp: 90 tablet, Rfl: 3    valsartan-hydrochlorothiazide (DIOVAN-HCT) 160-12.5 mg per tablet, Take 1 tablet by mouth once daily., Disp: 90 tablet, Rfl: 3    vitamin D3-vitamin K2 1,250-200 mcg Cap,  0 Refill(s), Disp: , Rfl:     Review of Systems   Constitutional:  Positive for  "malaise/fatigue.   Respiratory:  Negative for cough.    Cardiovascular:  Negative for chest pain, palpitations, orthopnea, leg swelling and PND.   Gastrointestinal: Negative.    Genitourinary: Negative.    Musculoskeletal: Negative.    Neurological: Negative.    All other systems reviewed and are negative.    Objective:   /60   Pulse (!) 55   Ht 5' 3" (1.6 m)   Wt 60.1 kg (132 lb 7.9 oz)   LMP  (LMP Unknown)   SpO2 98%   BMI 23.47 kg/m²   Physical Exam  Constitutional:       Appearance: Normal appearance.   Cardiovascular:      Rate and Rhythm: Normal rate and regular rhythm.   Pulmonary:      Effort: Pulmonary effort is normal. No respiratory distress.      Breath sounds: Normal breath sounds.   Musculoskeletal:      Right lower leg: No edema.      Left lower leg: No edema.   Skin:     General: Skin is warm.   Neurological:      Mental Status: She is alert.   Psychiatric:         Mood and Affect: Mood normal.       Labs:     Lab Results   Component Value Date     11/10/2022    K 4.5 11/10/2022     11/10/2022    CO2 28 11/10/2022    BUN 14 11/10/2022    CREATININE 0.6 11/10/2022    ANIONGAP 7 (L) 11/10/2022     No results found for: HGBA1C  No results found for: BNP, BNPTRIAGEBLO    Lab Results   Component Value Date    WBC 6.23 07/13/2020    HGB 14.8 07/13/2020    HCT 46.5 07/13/2020     07/13/2020    GRAN 3.3 07/13/2020     Lab Results   Component Value Date    CHOL 133 10/25/2022    HDL 66 10/25/2022    LDLCALC 57.4 (L) 10/25/2022    LDLCALC 122 (H) 01/13/2017    TRIG 48 10/25/2022       Lab Results   Component Value Date     11/10/2022    K 4.5 11/10/2022     11/10/2022    CO2 28 11/10/2022    BUN 14 11/10/2022    CREATININE 0.6 11/10/2022    ANIONGAP 7 (L) 11/10/2022     No results found for: HGBA1C  No results found for: BNP, BNPTRIAGEBLO Lab Results   Component Value Date    WBC 6.23 07/13/2020    HGB 14.8 07/13/2020    HCT 46.5 07/13/2020     07/13/2020    " GRAN 3.3 07/13/2020     Lab Results   Component Value Date    CHOL 133 10/25/2022    HDL 66 10/25/2022    LDLCALC 57.4 (L) 10/25/2022    LDLCALC 122 (H) 01/13/2017    TRIG 48 10/25/2022                Cardiovascular Imaging:     EKG:  My independent visualization of most recent EKG is normal sinus rhythm, nonspecific ST changes     TTE:  01/11/2022   The left ventricle is normal in size with concentric hypertrophy and normal systolic function.  The estimated ejection fraction is 62%.  Normal left ventricular diastolic function.  The left ventricular global longitudinal strain is -17%. Borderline.  Normal right ventricular size with normal right ventricular systolic function.  Normal central venous pressure (3 mmHg).  The estimated PA systolic pressure is 19 mmHg.  Mild left atrial enlargement.      CT Calcium    1/2022:  Your total calcium score is 155, consistent with moderate to high risk of coronary artery disease.    Assessment & Plan:     Essential Hypertension  Previously on losartan with uncontrolled hypertension.  Currently on valsartan/HCTZ with blood pressure readings at goal  .-Valsartan-hydrochlorothiazide (DIOVAN-HCT) 160-12.5 mg per tablet; Take 1 tablet by mouth once daily.     Agatston coronary artery calcium score between 100 and 199, 155 in 2022  Aortic atherosclerosis  Cardiovascular event risk, ASCVD 10-years risk 16.5%, 2020  -Continue Crestor continue aspirin  -Check lipids annually     Patient to follow-up in 1 year.    Signed:  Alysia SANTOS M.D.  Page # [phone]  Cardiovascular Fellow  [facility]

## 2023-07-28 ENCOUNTER — PATIENT MESSAGE (OUTPATIENT)
Dept: FAMILY MEDICINE | Facility: CLINIC | Age: 73
End: 2023-07-28
Payer: MEDICARE

## 2023-07-29 RX ORDER — NITROGLYCERIN 0.4 MG/1
0.4 TABLET SUBLINGUAL EVERY 5 MIN PRN
Qty: 90 TABLET | Refills: 2 | Status: SHIPPED | OUTPATIENT
Start: 2023-07-29 | End: 2024-07-28

## 2023-09-20 ENCOUNTER — PATIENT MESSAGE (OUTPATIENT)
Dept: ENDOCRINOLOGY | Facility: CLINIC | Age: 73
End: 2023-09-20
Payer: MEDICARE

## 2023-10-16 ENCOUNTER — HOSPITAL ENCOUNTER (OUTPATIENT)
Dept: RADIOLOGY | Facility: CLINIC | Age: 73
Discharge: HOME | End: 2023-10-16
Attending: PHYSICIAN ASSISTANT
Payer: MEDICARE

## 2023-10-16 DIAGNOSIS — Z78.0 POSTMENOPAUSAL: ICD-10-CM

## 2023-10-16 PROCEDURE — 77080 DEXA BONE DENSITY SPINE HIP: ICD-10-PCS | Mod: 26,,, | Performed by: RADIOLOGY

## 2023-10-16 PROCEDURE — 77080 DXA BONE DENSITY AXIAL: CPT | Mod: TC,PO

## 2023-10-16 PROCEDURE — 77080 DXA BONE DENSITY AXIAL: CPT | Mod: 26,,, | Performed by: RADIOLOGY

## 2023-10-17 ENCOUNTER — OFFICE VISIT (OUTPATIENT)
Dept: FAMILY MEDICINE | Facility: CLINIC | Age: 73
End: 2023-10-17
Payer: MEDICARE

## 2023-10-17 VITALS
SYSTOLIC BLOOD PRESSURE: 130 MMHG | OXYGEN SATURATION: 99 % | DIASTOLIC BLOOD PRESSURE: 80 MMHG | HEIGHT: 63 IN | BODY MASS INDEX: 23.95 KG/M2 | HEART RATE: 56 BPM | RESPIRATION RATE: 16 BRPM | WEIGHT: 135.19 LBS | TEMPERATURE: 98 F

## 2023-10-17 DIAGNOSIS — R10.11 RIGHT UPPER QUADRANT ABDOMINAL PAIN: Primary | ICD-10-CM

## 2023-10-17 DIAGNOSIS — L29.9 ITCHING: ICD-10-CM

## 2023-10-17 PROCEDURE — 3288F PR FALLS RISK ASSESSMENT DOCUMENTED: ICD-10-PCS | Mod: CPTII,S$GLB,, | Performed by: FAMILY MEDICINE

## 2023-10-17 PROCEDURE — 1160F PR REVIEW ALL MEDS BY PRESCRIBER/CLIN PHARMACIST DOCUMENTED: ICD-10-PCS | Mod: CPTII,S$GLB,, | Performed by: FAMILY MEDICINE

## 2023-10-17 PROCEDURE — 1159F MED LIST DOCD IN RCRD: CPT | Mod: CPTII,S$GLB,, | Performed by: FAMILY MEDICINE

## 2023-10-17 PROCEDURE — 1126F PR PAIN SEVERITY QUANTIFIED, NO PAIN PRESENT: ICD-10-PCS | Mod: CPTII,S$GLB,, | Performed by: FAMILY MEDICINE

## 2023-10-17 PROCEDURE — 1160F RVW MEDS BY RX/DR IN RCRD: CPT | Mod: CPTII,S$GLB,, | Performed by: FAMILY MEDICINE

## 2023-10-17 PROCEDURE — 3008F PR BODY MASS INDEX (BMI) DOCUMENTED: ICD-10-PCS | Mod: CPTII,S$GLB,, | Performed by: FAMILY MEDICINE

## 2023-10-17 PROCEDURE — 3075F PR MOST RECENT SYSTOLIC BLOOD PRESS GE 130-139MM HG: ICD-10-PCS | Mod: CPTII,S$GLB,, | Performed by: FAMILY MEDICINE

## 2023-10-17 PROCEDURE — 1126F AMNT PAIN NOTED NONE PRSNT: CPT | Mod: CPTII,S$GLB,, | Performed by: FAMILY MEDICINE

## 2023-10-17 PROCEDURE — 99213 PR OFFICE/OUTPT VISIT, EST, LEVL III, 20-29 MIN: ICD-10-PCS | Mod: S$GLB,,, | Performed by: FAMILY MEDICINE

## 2023-10-17 PROCEDURE — 1101F PT FALLS ASSESS-DOCD LE1/YR: CPT | Mod: CPTII,S$GLB,, | Performed by: FAMILY MEDICINE

## 2023-10-17 PROCEDURE — 99213 OFFICE O/P EST LOW 20 MIN: CPT | Mod: S$GLB,,, | Performed by: FAMILY MEDICINE

## 2023-10-17 PROCEDURE — 3079F PR MOST RECENT DIASTOLIC BLOOD PRESSURE 80-89 MM HG: ICD-10-PCS | Mod: CPTII,S$GLB,, | Performed by: FAMILY MEDICINE

## 2023-10-17 PROCEDURE — 1159F PR MEDICATION LIST DOCUMENTED IN MEDICAL RECORD: ICD-10-PCS | Mod: CPTII,S$GLB,, | Performed by: FAMILY MEDICINE

## 2023-10-17 PROCEDURE — 99999 PR PBB SHADOW E&M-EST. PATIENT-LVL IV: CPT | Mod: PBBFAC,,, | Performed by: FAMILY MEDICINE

## 2023-10-17 PROCEDURE — 99999 PR PBB SHADOW E&M-EST. PATIENT-LVL IV: ICD-10-PCS | Mod: PBBFAC,,, | Performed by: FAMILY MEDICINE

## 2023-10-17 PROCEDURE — 1101F PR PT FALLS ASSESS DOC 0-1 FALLS W/OUT INJ PAST YR: ICD-10-PCS | Mod: CPTII,S$GLB,, | Performed by: FAMILY MEDICINE

## 2023-10-17 PROCEDURE — 3008F BODY MASS INDEX DOCD: CPT | Mod: CPTII,S$GLB,, | Performed by: FAMILY MEDICINE

## 2023-10-17 PROCEDURE — 3288F FALL RISK ASSESSMENT DOCD: CPT | Mod: CPTII,S$GLB,, | Performed by: FAMILY MEDICINE

## 2023-10-17 PROCEDURE — 3075F SYST BP GE 130 - 139MM HG: CPT | Mod: CPTII,S$GLB,, | Performed by: FAMILY MEDICINE

## 2023-10-17 PROCEDURE — 3079F DIAST BP 80-89 MM HG: CPT | Mod: CPTII,S$GLB,, | Performed by: FAMILY MEDICINE

## 2023-10-17 RX ORDER — HYDROXYZINE HYDROCHLORIDE 10 MG/1
10 TABLET, FILM COATED ORAL 3 TIMES DAILY PRN
Qty: 60 TABLET | Refills: 1 | Status: SHIPPED | OUTPATIENT
Start: 2023-10-17 | End: 2024-03-20 | Stop reason: SDUPTHER

## 2023-10-17 RX ORDER — AZITHROMYCIN 250 MG/1
TABLET, FILM COATED ORAL
Qty: 6 TABLET | Refills: 1 | Status: SHIPPED | OUTPATIENT
Start: 2023-10-17 | End: 2023-10-22

## 2023-10-17 RX ORDER — HYDRALAZINE HYDROCHLORIDE 10 MG/1
10 TABLET, FILM COATED ORAL 3 TIMES DAILY PRN
Qty: 90 TABLET | Refills: 11 | Status: SHIPPED | OUTPATIENT
Start: 2023-10-17 | End: 2023-10-17

## 2023-10-17 NOTE — PROGRESS NOTES
[facility] - Clinic Note    Subjective      Ms. Joseph is a 73 y.o. female who presents to clinic for Back Pain    Patient reports that she had episode of pain to her mid back that happened when she was on a cruise last.  This has happened to her before.  No extreme right upper quadrant pain but sister recently had a nonfunctioning gallbladder and had the same symptoms.  Also had an episode of vomiting with the pain.    [facility] Eva has a past medical history of Breast cancer (2000), Depression, Essential hypertension (5/18/2022), Hypertriglyceridemia (7/21/2019), Nephrolithiasis (2/5/2015), and Osteopenia.   PSX Eva has a past surgical history that includes Breast lumpectomy (2000); Hysterectomy (1998); Bilateral oophorectomy; Femoral hernia (2010); Mastectomy (2013 ); Tonsillectomy; Adenoidectomy; Hemorrhoid surgery; Breast reconstruction (Bilateral, 2013); and Oophorectomy.    Eva's family history includes Breast cancer in her maternal grandmother and mother; Cancer in her maternal aunt and maternal grandfather; Heart disease (age of onset: 56) in her father; Hyperlipidemia in her son; Hypertension in her father; No Known Problems in her brother, daughter, daughter, sister, and sister; Ovarian cancer in her maternal aunt; Parkinsonism in her paternal grandfather; Skin cancer in her brother; Stroke in her paternal grandmother.    Eva reports that she has never smoked. She has never used smokeless tobacco. She reports current alcohol use of about 1.0 standard drink of alcohol per week. She reports that she does not use drugs.   BILLY Velasquez is allergic to sulfa (sulfonamide antibiotics).   MED Eva has a current medication list which includes the following prescription(s): alendronate, amino acids-minerals, ascorbic acid (vitamin c), aspirin, betamethasone dipropionate, coenzyme q10-vitamin e, fish oil-dha-epa, glucosamine-chondroitin, ketoconazole, minoxidil, multivitamin,  "nitroglycerin, rosuvastatin, valsartan-hydrochlorothiazide, vitamin d3-vitamin k2, azithromycin, and hydroxyzine hcl.     Review of Systems   Constitutional:  Negative for chills and fever.   HENT:  Negative for congestion and rhinorrhea.    Eyes:  Negative for visual disturbance.   Respiratory:  Negative for cough and shortness of breath.    Cardiovascular:  Negative for chest pain.   Gastrointestinal:  Negative for abdominal pain, constipation, diarrhea, nausea and vomiting.   Genitourinary:  Negative for dysuria.   Musculoskeletal:  Negative for myalgias.   Skin:  Negative for rash.   Neurological:  Negative for weakness and headaches.     Objective     /80 (BP Location: Right arm, Patient Position: Sitting, BP Method: Large (Manual))   Pulse (!) 56   Temp 97.9 °F (36.6 °C) (Temporal)   Resp 16   Ht 5' 3" (1.6 m)   Wt 61.3 kg (135 lb 3.2 oz)   LMP  (LMP Unknown)   SpO2 99%   BMI 23.95 kg/m²     Physical Exam  Vitals and nursing note reviewed.   Constitutional:       General: She is not in acute distress.     Appearance: Normal appearance. She is well-developed. She is not diaphoretic.   HENT:      Head: Normocephalic and atraumatic.      Right Ear: External ear normal.      Left Ear: External ear normal.   Eyes:      General:         Right eye: No discharge.         Left eye: No discharge.   Cardiovascular:      Rate and Rhythm: Normal rate and regular rhythm.      Heart sounds: Normal heart sounds.   Pulmonary:      Effort: Pulmonary effort is normal.      Breath sounds: Normal breath sounds. No wheezing or rales.   Skin:     General: Skin is warm and dry.   Neurological:      Mental Status: She is alert and oriented to person, place, and time. Mental status is at baseline.   Psychiatric:         Mood and Affect: Mood normal.         Behavior: Behavior normal.         Thought Content: Thought content normal.         Judgment: Judgment normal.        Assessment/Plan     1. Right upper quadrant " abdominal pain  US Abdomen Limited      2. Itching  hydrOXYzine HCL (ATARAX) 10 MG Tab    DISCONTINUED: hydrALAZINE (APRESOLINE) 10 MG tablet        Given family history will obtain ultrasound to evaluate gallbladder.  May need to get HIDA scan.  Refilled medication as above.    Future Appointments   Date Time Provider Department Center   10/19/2023  2:00 PM LEYLA Ghotra PA-C [facility] [place]   10/23/2023 10:45 AM [facility]         Micha Marshall MD  Family Medicine  [facility]

## 2023-10-19 ENCOUNTER — OFFICE VISIT (OUTPATIENT)
Dept: ENDOCRINOLOGY | Facility: CLINIC | Age: 73
End: 2023-10-19
Payer: MEDICARE

## 2023-10-19 VITALS
SYSTOLIC BLOOD PRESSURE: 104 MMHG | DIASTOLIC BLOOD PRESSURE: 66 MMHG | OXYGEN SATURATION: 98 % | WEIGHT: 135.13 LBS | BODY MASS INDEX: 23.94 KG/M2 | TEMPERATURE: 98 F | HEART RATE: 67 BPM | HEIGHT: 63 IN

## 2023-10-19 DIAGNOSIS — L29.9 ITCHING: ICD-10-CM

## 2023-10-19 DIAGNOSIS — E55.9 HYPOVITAMINOSIS D: ICD-10-CM

## 2023-10-19 DIAGNOSIS — I10 PRIMARY HYPERTENSION: ICD-10-CM

## 2023-10-19 DIAGNOSIS — M81.0 OSTEOPOROSIS, UNSPECIFIED OSTEOPOROSIS TYPE, UNSPECIFIED PATHOLOGICAL FRACTURE PRESENCE: ICD-10-CM

## 2023-10-19 DIAGNOSIS — E78.1 HYPERTRIGLYCERIDEMIA: ICD-10-CM

## 2023-10-19 DIAGNOSIS — E21.0 PRIMARY HYPERPARATHYROIDISM: Primary | ICD-10-CM

## 2023-10-19 DIAGNOSIS — E04.1 THYROID NODULE: ICD-10-CM

## 2023-10-19 PROCEDURE — 1160F RVW MEDS BY RX/DR IN RCRD: CPT | Mod: CPTII,S$GLB,, | Performed by: PHYSICIAN ASSISTANT

## 2023-10-19 PROCEDURE — 1159F MED LIST DOCD IN RCRD: CPT | Mod: CPTII,S$GLB,, | Performed by: PHYSICIAN ASSISTANT

## 2023-10-19 PROCEDURE — 99213 PR OFFICE/OUTPT VISIT, EST, LEVL III, 20-29 MIN: ICD-10-PCS | Mod: S$GLB,,, | Performed by: PHYSICIAN ASSISTANT

## 2023-10-19 PROCEDURE — 1126F AMNT PAIN NOTED NONE PRSNT: CPT | Mod: CPTII,S$GLB,, | Performed by: PHYSICIAN ASSISTANT

## 2023-10-19 PROCEDURE — 99213 OFFICE O/P EST LOW 20 MIN: CPT | Mod: S$GLB,,, | Performed by: PHYSICIAN ASSISTANT

## 2023-10-19 PROCEDURE — 3008F BODY MASS INDEX DOCD: CPT | Mod: CPTII,S$GLB,, | Performed by: PHYSICIAN ASSISTANT

## 2023-10-19 PROCEDURE — 3288F FALL RISK ASSESSMENT DOCD: CPT | Mod: CPTII,S$GLB,, | Performed by: PHYSICIAN ASSISTANT

## 2023-10-19 PROCEDURE — 99999 PR PBB SHADOW E&M-EST. PATIENT-LVL IV: CPT | Mod: PBBFAC,,, | Performed by: PHYSICIAN ASSISTANT

## 2023-10-19 PROCEDURE — 99999 PR PBB SHADOW E&M-EST. PATIENT-LVL IV: ICD-10-PCS | Mod: PBBFAC,,, | Performed by: PHYSICIAN ASSISTANT

## 2023-10-19 PROCEDURE — 3008F PR BODY MASS INDEX (BMI) DOCUMENTED: ICD-10-PCS | Mod: CPTII,S$GLB,, | Performed by: PHYSICIAN ASSISTANT

## 2023-10-19 PROCEDURE — 3078F DIAST BP <80 MM HG: CPT | Mod: CPTII,S$GLB,, | Performed by: PHYSICIAN ASSISTANT

## 2023-10-19 PROCEDURE — 1101F PR PT FALLS ASSESS DOC 0-1 FALLS W/OUT INJ PAST YR: ICD-10-PCS | Mod: CPTII,S$GLB,, | Performed by: PHYSICIAN ASSISTANT

## 2023-10-19 PROCEDURE — 1160F PR REVIEW ALL MEDS BY PRESCRIBER/CLIN PHARMACIST DOCUMENTED: ICD-10-PCS | Mod: CPTII,S$GLB,, | Performed by: PHYSICIAN ASSISTANT

## 2023-10-19 PROCEDURE — 1159F PR MEDICATION LIST DOCUMENTED IN MEDICAL RECORD: ICD-10-PCS | Mod: CPTII,S$GLB,, | Performed by: PHYSICIAN ASSISTANT

## 2023-10-19 PROCEDURE — 3074F PR MOST RECENT SYSTOLIC BLOOD PRESSURE < 130 MM HG: ICD-10-PCS | Mod: CPTII,S$GLB,, | Performed by: PHYSICIAN ASSISTANT

## 2023-10-19 PROCEDURE — 3078F PR MOST RECENT DIASTOLIC BLOOD PRESSURE < 80 MM HG: ICD-10-PCS | Mod: CPTII,S$GLB,, | Performed by: PHYSICIAN ASSISTANT

## 2023-10-19 PROCEDURE — 3288F PR FALLS RISK ASSESSMENT DOCUMENTED: ICD-10-PCS | Mod: CPTII,S$GLB,, | Performed by: PHYSICIAN ASSISTANT

## 2023-10-19 PROCEDURE — 1126F PR PAIN SEVERITY QUANTIFIED, NO PAIN PRESENT: ICD-10-PCS | Mod: CPTII,S$GLB,, | Performed by: PHYSICIAN ASSISTANT

## 2023-10-19 PROCEDURE — 1101F PT FALLS ASSESS-DOCD LE1/YR: CPT | Mod: CPTII,S$GLB,, | Performed by: PHYSICIAN ASSISTANT

## 2023-10-19 PROCEDURE — 3074F SYST BP LT 130 MM HG: CPT | Mod: CPTII,S$GLB,, | Performed by: PHYSICIAN ASSISTANT

## 2023-10-19 NOTE — PROGRESS NOTES
CC: Hyperparathyroidism/Nodular thyroid disease and osteopenia    HPI: Eva Joseph is a 73 y.o. female here for nodular thyroid disease along with pending conditions listed in the Visit Diagnosis. No fhx of thyroid disease. Her grandson has Type 1 DM. Patient had bilateral breast cancer for which she was treated with bilateral mastectomy. She was -ve for BRCA1 and 2. 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 She had COVID-19 at [place].    Thyroid u/s 10/22 shows 1.8 cm nodule in left lobe that had a benign FNA. Occ dysphagia (lower esophagus). No SOB or voice changes. + TPO.   + fatigue, hair loss, heat intolerance, mental fogginess. No palpitations, constipation, diarrhea or tremors.    Kidney stones 1970, 1977 w/ pregnancies. She also had one stone in 2005. No n/v, abdominal pain or muscle weakness.     DEXA scan: 10/23 osteopenia.   Spine: -1.8 (-2.4)  Lfn: -1.5  (-1.7)  Fx: 11%  Hfx: 2%      On fosamax 70 mg weekly since 8/21.   No fractures, falls or steriod injections.     Previous meds:  reclast for three years.     Taking ca & vitamin d.     Exercise: pickle ball once weekly, line dancing 2x weekly and the gym 2x weekly (sydnee class). She also does a stretching class.     No steriod injections.    Pt is taking atarax for itching on her scalp.    PMHx, PSHx: reviewed in epic.  Social Hx: no ETOH/tobacco use.     Wt Readings from Last 5 Encounters:   10/19/23 61.3 kg (135 lb 2.3 oz)   10/17/23 61.3 kg (135 lb 3.2 oz)   07/26/23 60.1 kg (132 lb 7.9 oz)   03/27/23 60.8 kg (134 lb)   02/27/23 62.6 kg (138 lb)      ROS:   Constitutional: no fatigue, wt loss (3 lbs).  Eyes: No recent visual changes  Cardiovascular: + feet sweling, Denies current anginal symptoms  Respiratory: Denies current respiratory difficulty  Gastrointestinal: Denies recent bowel disturbances  GenitoUrinary - No dysuria  Skin: + scalp itching  Neurologic: + numbness and tingling in  "feet  Endocrine: no polyphagia, polydipsia or polyuria  Psych: no anxiety or depression  Remainder ROS negative     /66 (BP Location: Left arm, Patient Position: Sitting, BP Method: Medium (Manual))   Pulse 67   Temp 98.4 °F (36.9 °C) (Oral)   Ht 5' 3" (1.6 m)   Wt 61.3 kg (135 lb 2.3 oz)   LMP  (LMP Unknown)   SpO2 98%   BMI 23.94 kg/m²      Personally reviewed labs below:  Lab Results   Component Value Date    TSH 0.960 10/16/2023    Y6RBONL 80 01/22/2020    FREET4 0.96 12/16/2021     Lab Results   Component Value Date    PTH 40.7 10/16/2023    CALCIUM 10.4 10/16/2023    CAION 1.35 10/16/2023    PHOS 2.6 (L) 10/11/2022         Chemistry        Component Value Date/Time     10/16/2023 1304    K 3.9 10/16/2023 1304     10/16/2023 1304    CO2 24 10/16/2023 1304    BUN 15 10/16/2023 1304    CREATININE 0.8 10/16/2023 1304     (H) 10/16/2023 1304        Component Value Date/Time    CALCIUM 10.4 10/16/2023 1304    ALKPHOS 99 10/16/2023 1304    AST 28 10/16/2023 1304    ALT 49 (H) 10/16/2023 1304    BILITOT 0.6 10/16/2023 1304    ESTGFRAFRICA >60.0 12/16/2021 0832    EGFRNONAA >60.0 12/16/2021 0832         No results found for: "HGBA1C"     EXAMINATION:  US SOFT TISSUE HEAD NECK THYROID     CLINICAL HISTORY:  Nontoxic single thyroid nodule     TECHNIQUE:  Ultrasound of the thyroid and cervical lymph nodes was performed.     COMPARISON:  Ultrasound 01/13/2022.     FINDINGS:  Thyroid lobes are prominent size measuring 5.2 x 1.6 x 1.7 cm on the right and 5.1 x 2.0 x 1.8 cm on left.  This measures to a volume of 16.9 cc.  The isthmus measures 0.40 cm.  Thyroid lobes demonstrate normal blood flow by color Doppler.     There are mixed cystic and solid thyroid nodules.  Dominant nodule on the right measures 0.6 x 0.3 x 0.3 cm.  This does not meet criteria for FNA.  Dominant nodule left measures 1.8 x 1.4 x 1.4 cm.  This is a solid TI-RADS 3 nodule and meets criteria for ultrasound follow-up.   "   Multiple small benign appearing cervical lymph nodes are identified.     Impression:     1. Chronic multinodular thyroid.  2. Left TI-RADS 3 nodule which meets criteria for ultrasound follow-up.        Electronically signed by: Hipolito Chaudhary  Date:                                            10/11/2022  Time:                                           14:34    PE:  GENERAL: elderly female (looks younger than stated age), well developed, well nourished  RESPIRATORY: Normal effort,   NEURO: steady gait, CN ll-Xll grossly intact  SKIN: no rash seen on scalp  Psych: normal mood and affect    Assessment/Plan:   1. Primary hyperparathyroidism  US Soft Tissue Head Neck Thyroid    PTH, Intact    Comprehensive Metabolic Panel    Calcium, Ionized      2. Osteoporosis, unspecified osteoporosis type, unspecified pathological fracture presence        3. Thyroid nodule  US Soft Tissue Head Neck Thyroid    T4, Free    TSH    US Soft Tissue Head Neck Thyroid      4. Hypovitaminosis D  Vitamin D      5. Hypertriglyceridemia        6. Itching        7. Primary hypertension          Primary hyperparathyroidism-PTH wnl. Ca normal.  Osteoporosis-continue fosamax, ca and vd. Repeat DEXA 10/25. Continue exercise.  Thyroid nodule-TSH wnl. Repeat thyroid u/s 10/23.  Postmenopausal-see above  Hypovitaminosis B-xnngau-dxqajtdf otc vd  Qnxenevfwoflxaioeujc-qywkuh-scqqpcgw fish oil and ASA.  Itching-continue atarax 10 mg daily.  HTN-elevated-start norvasc 5 mg qd.    Thyroid u/s  F/u in one year w/ labs and us

## 2023-10-23 ENCOUNTER — HOSPITAL ENCOUNTER (OUTPATIENT)
Dept: RADIOLOGY | Facility: HOSPITAL | Age: 73
Discharge: HOME | End: 2023-10-23
Attending: FAMILY MEDICINE
Payer: MEDICARE

## 2023-10-23 DIAGNOSIS — R10.11 RIGHT UPPER QUADRANT ABDOMINAL PAIN: ICD-10-CM

## 2023-10-23 PROCEDURE — 76705 ECHO EXAM OF ABDOMEN: CPT | Mod: TC

## 2023-10-23 PROCEDURE — 76705 US ABDOMEN LIMITED: ICD-10-PCS | Mod: 26,,, | Performed by: RADIOLOGY

## 2023-10-23 PROCEDURE — 76705 ECHO EXAM OF ABDOMEN: CPT | Mod: 26,,, | Performed by: RADIOLOGY

## 2023-10-24 ENCOUNTER — PATIENT MESSAGE (OUTPATIENT)
Dept: FAMILY MEDICINE | Facility: CLINIC | Age: 73
End: 2023-10-24
Payer: MEDICARE

## 2023-10-24 DIAGNOSIS — K80.20 CALCULUS OF GALLBLADDER WITHOUT CHOLECYSTITIS WITHOUT OBSTRUCTION: Primary | ICD-10-CM

## 2023-10-25 ENCOUNTER — HOSPITAL ENCOUNTER (OUTPATIENT)
Dept: RADIOLOGY | Facility: HOSPITAL | Age: 73
Discharge: HOME | End: 2023-10-25
Attending: PHYSICIAN ASSISTANT
Payer: MEDICARE

## 2023-10-25 DIAGNOSIS — E04.1 THYROID NODULE: ICD-10-CM

## 2023-10-25 PROCEDURE — 76536 US SOFT TISSUE HEAD NECK THYROID: ICD-10-PCS | Mod: 26,,, | Performed by: RADIOLOGY

## 2023-10-25 PROCEDURE — 76536 US EXAM OF HEAD AND NECK: CPT | Mod: TC

## 2023-10-25 PROCEDURE — 76536 US EXAM OF HEAD AND NECK: CPT | Mod: 26,,, | Performed by: RADIOLOGY

## 2023-10-30 ENCOUNTER — OFFICE VISIT (OUTPATIENT)
Dept: SURGERY | Facility: CLINIC | Age: 73
End: 2023-10-30
Payer: MEDICARE

## 2023-10-30 VITALS — SYSTOLIC BLOOD PRESSURE: 111 MMHG | HEART RATE: 61 BPM | TEMPERATURE: 98 F | DIASTOLIC BLOOD PRESSURE: 67 MMHG

## 2023-10-30 DIAGNOSIS — K80.20 CALCULUS OF GALLBLADDER WITHOUT CHOLECYSTITIS WITHOUT OBSTRUCTION: ICD-10-CM

## 2023-10-30 PROCEDURE — 3078F DIAST BP <80 MM HG: CPT | Mod: CPTII,S$GLB,, | Performed by: SURGERY

## 2023-10-30 PROCEDURE — 3074F SYST BP LT 130 MM HG: CPT | Mod: CPTII,S$GLB,, | Performed by: SURGERY

## 2023-10-30 PROCEDURE — 3288F FALL RISK ASSESSMENT DOCD: CPT | Mod: CPTII,S$GLB,, | Performed by: SURGERY

## 2023-10-30 PROCEDURE — 3078F PR MOST RECENT DIASTOLIC BLOOD PRESSURE < 80 MM HG: ICD-10-PCS | Mod: CPTII,S$GLB,, | Performed by: SURGERY

## 2023-10-30 PROCEDURE — 1125F AMNT PAIN NOTED PAIN PRSNT: CPT | Mod: CPTII,S$GLB,, | Performed by: SURGERY

## 2023-10-30 PROCEDURE — 3288F PR FALLS RISK ASSESSMENT DOCUMENTED: ICD-10-PCS | Mod: CPTII,S$GLB,, | Performed by: SURGERY

## 2023-10-30 PROCEDURE — 99204 OFFICE O/P NEW MOD 45 MIN: CPT | Mod: S$GLB,,, | Performed by: SURGERY

## 2023-10-30 PROCEDURE — 1101F PT FALLS ASSESS-DOCD LE1/YR: CPT | Mod: CPTII,S$GLB,, | Performed by: SURGERY

## 2023-10-30 PROCEDURE — 99204 PR OFFICE/OUTPT VISIT, NEW, LEVL IV, 45-59 MIN: ICD-10-PCS | Mod: S$GLB,,, | Performed by: SURGERY

## 2023-10-30 PROCEDURE — 1125F PR PAIN SEVERITY QUANTIFIED, PAIN PRESENT: ICD-10-PCS | Mod: CPTII,S$GLB,, | Performed by: SURGERY

## 2023-10-30 PROCEDURE — 1101F PR PT FALLS ASSESS DOC 0-1 FALLS W/OUT INJ PAST YR: ICD-10-PCS | Mod: CPTII,S$GLB,, | Performed by: SURGERY

## 2023-10-30 PROCEDURE — 3074F PR MOST RECENT SYSTOLIC BLOOD PRESSURE < 130 MM HG: ICD-10-PCS | Mod: CPTII,S$GLB,, | Performed by: SURGERY

## 2023-10-30 NOTE — H&P
GENERAL SURGERY  OUTPATIENT H&P    REASON FOR VISIT/CC: Cholelithiasis    HPI: Eva Joseph is a 73 y.o. female he was evaluated by her PCP after developing mid back pain that occurred to her while on a cruise. This was associated with nausea and vomiting. She did not have any significant right upper quadrant abdominal pain and her PCP felt it may be related to symptomatic cholelithiasis therefore she was sent for abdominal ultrasound which confirmed gallstones.  She is been referred to General surgery for further evaluation. Patient reports her episode of pain occurred after eating breakfast and was described as severe pain like and entered tube around her upper abdomen and back. May have had less severe episodes in the past but if so very infrequently. No further episodes since the occurrence on the cruise ship. Denies yellowing of the skin, fevers or chills. No significant NSAID use other than baby aspirin daily. Does endorse some lower esophageal dysphagia whenever she eats pork but this also occurs rarely.    I have reviewed the patient's chart including prior progress notes, procedures and testing.     ROS:   Review of Systems    PROBLEM LIST:  Patient Active Problem List   Diagnosis    Osteopenia    Alopecia    Thyroid nodule    Primary hyperparathyroidism    History of breast cancer    History of ductal carcinoma in situ (DCIS) of breast    Aortic atherosclerosis    Thyroid disease    Thyroiditis    Abnormal thyroid blood test    Postmenopausal    Hypertriglyceridemia    Hypovitaminosis D    Contusion of right index finger without damage to nail    COVID-19, breakthrough 12/25/2021    Cardiovascular event risk, ASCVD 10-years risk 16.5%, 2020    Nonspecific abnormal electrocardiogram (ECG) (EKG)    Family history of premature CAD    Elevated blood pressure reading    Agatston coronary artery calcium score between 100 and 199, 155 in 2022    LVH (left ventricular hypertrophy) due to hypertensive disease,  without heart failure    Essential hypertension         HISTORY  Past Medical History:   Diagnosis Date    Breast cancer 2000    also in 2013    Depression     Essential hypertension 5/18/2022    Hypertriglyceridemia 7/21/2019    Nephrolithiasis 2/5/2015    Osteopenia        Past Surgical History:   Procedure Laterality Date    ADENOIDECTOMY      BILATERAL OOPHORECTOMY      benign    BREAST LUMPECTOMY  2000    Right breast    BREAST RECONSTRUCTION Bilateral 2013    Femoral hernia  2010    right side repair    HEMORRHOID SURGERY      HYSTERECTOMY  1998    KELSEY, fibroid    MASTECTOMY  2013     bilateral     OOPHORECTOMY      TONSILLECTOMY         Social History     Tobacco Use    Smoking status: Never    Smokeless tobacco: Never   Substance Use Topics    Alcohol use: Yes     Alcohol/week: 1.0 standard drink of alcohol     Types: 1 Glasses of wine per week     Comment: Occasionally    Drug use: No       Family History   Problem Relation Age of Onset    Breast cancer Mother         Breast cancer-75yrs    Hypertension Father     Heart disease Father 56        MI    Cancer Maternal Aunt         ovarian    Ovarian cancer Maternal Aunt     Cancer Maternal Grandfather         testicular / prostate    Stroke Paternal Grandmother     Breast cancer Maternal Grandmother         Breast cancer-42yrs    Parkinsonism Paternal Grandfather     No Known Problems Sister     Skin cancer Brother     Hyperlipidemia Son         As a child    No Known Problems Daughter     No Known Problems Daughter     No Known Problems Sister     No Known Problems Brother     Amblyopia Neg Hx     Blindness Neg Hx     Cataracts Neg Hx     Diabetes Neg Hx     Glaucoma Neg Hx     Macular degeneration Neg Hx     Retinal detachment Neg Hx     Strabismus Neg Hx     Thyroid disease Neg Hx     Colon cancer Neg Hx     Melanoma Neg Hx          MEDS:  Current Outpatient Medications on File Prior to Visit   Medication Sig Dispense Refill    alendronate (FOSAMAX) 70 MG  tablet Take one tablet every 7 days. 12 tablet 3    amino acids-minerals Tab Take 2 tablets by mouth 3 (three) times daily.      betamethasone dipropionate (DIPROLENE) 0.05 % lotion Apply topically 2 (two) times daily. 60 mL 2    coenzyme Q10-vitamin E 100-100 mg-unit Cap Take by mouth. 1 Capsule Oral Every day      fish oil-dha-epa 1,200-144-216 mg Cap Take by mouth. 1 Capsule Oral Every day      glucosamine-chondroitin 500-400 mg tablet Take 1 tablet by mouth 3 (three) times daily.      hydrOXYzine HCL (ATARAX) 10 MG Tab Take 1 tablet (10 mg total) by mouth 3 (three) times daily as needed. 60 tablet 1    ketoconazole (NIZORAL) 2 % shampoo Apply topically twice a week. 120 mL 2    minoxidiL (LONITEN) 2.5 MG tablet Take 1/2 tablet PO QHS 45 tablet 3    multivitamin (THERAGRAN) per tablet Take by mouth. 1 Tablet Oral Every day      nitroGLYCERIN (NITROSTAT) 0.4 MG SL tablet Place 1 tablet (0.4 mg total) under the tongue every 5 (five) minutes as needed for Chest pain. 90 tablet 2    rosuvastatin (CRESTOR) 5 MG tablet Take 1 tablet (5 mg total) by mouth once daily. 90 tablet 3    valsartan-hydrochlorothiazide (DIOVAN-HCT) 160-12.5 mg per tablet Take 1 tablet by mouth once daily. 90 tablet 3    vitamin D3-vitamin K2 1,250-200 mcg Cap   0 Refill(s)      ascorbic acid, vitamin C, (VITAMIN C) 100 MG tablet   Daily, 0 Refill(s), Maintenance      aspirin (ECOTRIN) 81 MG EC tablet Take 1 tablet (81 mg total) by mouth once daily.  0     No current facility-administered medications on file prior to visit.       ALLERGIES:  Review of patient's allergies indicates:   Allergen Reactions    Sulfa (sulfonamide antibiotics) Other (See Comments)     Other reaction(s): Swelling         VITALS:  Vitals:    10/30/23 1428   BP: 111/67   Pulse: 61   Temp: 98.3 °F (36.8 °C)         PHYSICAL EXAM:  Physical Exam  Vitals reviewed.   Constitutional:       General: She is not in acute distress.     Appearance: Normal appearance. She is  well-developed.   HENT:      Head: Normocephalic and atraumatic.      Nose: Nose normal.   Eyes:      General: No scleral icterus.     Conjunctiva/sclera: Conjunctivae normal.   Neck:      Trachea: No tracheal tenderness or tracheal deviation.   Cardiovascular:      Rate and Rhythm: Normal rate and regular rhythm.      Pulses: Normal pulses.   Pulmonary:      Effort: Pulmonary effort is normal. No accessory muscle usage or respiratory distress.      Breath sounds: Normal breath sounds.   Abdominal:      General: There is no distension.      Palpations: Abdomen is soft.      Tenderness: There is no abdominal tenderness.      Comments: Well-healed lower abdominal transverse incision from previous bilateral ROB flaps for breast reconstruction   Musculoskeletal:         General: No swelling or tenderness. Normal range of motion.      Cervical back: Normal range of motion and neck supple. No rigidity.   Skin:     General: Skin is warm and dry.      Coloration: Skin is not jaundiced.      Findings: No erythema.   Neurological:      General: No focal deficit present.      Mental Status: She is alert and oriented to person, place, and time.      Motor: No weakness or abnormal muscle tone.   Psychiatric:         Mood and Affect: Mood normal.         Behavior: Behavior normal.         Thought Content: Thought content normal.         Judgment: Judgment normal.           LABS:  Lab Results   Component Value Date    WBC 6.23 07/13/2020    RBC 5.22 07/13/2020    HGB 14.8 07/13/2020    HCT 46.5 07/13/2020     07/13/2020     Lab Results   Component Value Date     (H) 10/16/2023     10/16/2023    K 3.9 10/16/2023     10/16/2023    CO2 24 10/16/2023    BUN 15 10/16/2023    CREATININE 0.8 10/16/2023    CALCIUM 10.4 10/16/2023     Lab Results   Component Value Date    ALT 49 (H) 10/16/2023    AST 28 10/16/2023    ALKPHOS 99 10/16/2023    BILITOT 0.6 10/16/2023     Lab Results   Component Value Date    MG 2.2  01/22/2020    PHOS 2.6 (L) 10/11/2022       STUDIES:  Images and reports were personally reviewed.  Abd US 10/23/23  FINDINGS:  Liver: Normal in size, measuring 13.9 cm. Homogeneous echotexture. No focal hepatic lesions.     Gallbladder: Normally distended with echogenic dependent gallstones measuring up to 4 mm.  No gallbladder wall thickening or pericholecystic fluid.  No sonographic Moreland's sign.     Pancreas: The visualized portions of the pancreas are normal in size and echogenicity.     Biliary system: The common duct is not dilated, measuring 5.9 mm.  No intrahepatic ductal dilatation.     Spleen: Normal in size and echogenicity measuring 8.9 cm.     IVC identified.     Miscellaneous: No upper abdominal ascites.     Impression:     1. Cholelithiasis without sonographic evidence to suggest acute cholecystitis.  2. No ascites.      ASSESSMENT & PLAN:  73 y.o. female with gallstones and episodes of upper back pain  -signs and symptoms likely related to symptomatic cholelithiasis, less likely related to pancreatitis, PUD or hiatal hernia  -no evidence of acute cholecystitis or common bile duct dilatation based off of labs and imaging  -reasonable to offer the patient laparoscopic cholecystectomy due to suspicion that pain is related to symptomatic cholelithiasis and biliary colic  - risks and benefits of cholecystectomy were reviewed.  Specifically we discussed the risk of pain, bleeding, scarring, infection, bile leak, injury to common bile duct, injury to surrounding organs, retained stone, pancreatitis, bile leak, failure to relieve symptoms, etc.  we also discussed the need for potential conversion to open procedure or need to perform intraoperative cholangiogram.  The patient expressed understanding of these risks  -at this time however the patient is hesitant to undergo surgery as her symptoms are very infrequent, she also has a trip planned next month and even if she decided on surgery would like to defer  until after that trip, I think this is reasonable given the rarity of her symptoms however I did explain if they were to become more frequent or if she were to experience complicating factors such as fevers, jaundice, worsening pain that we may have to do surgery regardless, patient expressed understanding of this plan  -she will contact the office in the future if she develops more symptoms were if she would like to schedule for surgery

## 2023-11-08 ENCOUNTER — OFFICE VISIT (OUTPATIENT)
Dept: FAMILY MEDICINE | Facility: CLINIC | Age: 73
End: 2023-11-08
Payer: MEDICARE

## 2023-11-08 VITALS
HEART RATE: 66 BPM | OXYGEN SATURATION: 98 % | TEMPERATURE: 98 F | DIASTOLIC BLOOD PRESSURE: 69 MMHG | WEIGHT: 134.88 LBS | HEIGHT: 63 IN | SYSTOLIC BLOOD PRESSURE: 108 MMHG | BODY MASS INDEX: 23.9 KG/M2

## 2023-11-08 DIAGNOSIS — I10 ESSENTIAL HYPERTENSION: ICD-10-CM

## 2023-11-08 DIAGNOSIS — J01.40 ACUTE NON-RECURRENT PANSINUSITIS: Primary | ICD-10-CM

## 2023-11-08 PROCEDURE — 3078F PR MOST RECENT DIASTOLIC BLOOD PRESSURE < 80 MM HG: ICD-10-PCS | Mod: CPTII,S$GLB,,

## 2023-11-08 PROCEDURE — 1160F RVW MEDS BY RX/DR IN RCRD: CPT | Mod: CPTII,S$GLB,,

## 2023-11-08 PROCEDURE — 1159F PR MEDICATION LIST DOCUMENTED IN MEDICAL RECORD: ICD-10-PCS | Mod: CPTII,S$GLB,,

## 2023-11-08 PROCEDURE — 99999 PR PBB SHADOW E&M-EST. PATIENT-LVL V: ICD-10-PCS | Mod: PBBFAC,,,

## 2023-11-08 PROCEDURE — 3288F FALL RISK ASSESSMENT DOCD: CPT | Mod: CPTII,S$GLB,,

## 2023-11-08 PROCEDURE — 1126F PR PAIN SEVERITY QUANTIFIED, NO PAIN PRESENT: ICD-10-PCS | Mod: CPTII,S$GLB,,

## 2023-11-08 PROCEDURE — 1101F PR PT FALLS ASSESS DOC 0-1 FALLS W/OUT INJ PAST YR: ICD-10-PCS | Mod: CPTII,S$GLB,,

## 2023-11-08 PROCEDURE — 3008F PR BODY MASS INDEX (BMI) DOCUMENTED: ICD-10-PCS | Mod: CPTII,S$GLB,,

## 2023-11-08 PROCEDURE — 1159F MED LIST DOCD IN RCRD: CPT | Mod: CPTII,S$GLB,,

## 2023-11-08 PROCEDURE — 3008F BODY MASS INDEX DOCD: CPT | Mod: CPTII,S$GLB,,

## 2023-11-08 PROCEDURE — 1101F PT FALLS ASSESS-DOCD LE1/YR: CPT | Mod: CPTII,S$GLB,,

## 2023-11-08 PROCEDURE — 1126F AMNT PAIN NOTED NONE PRSNT: CPT | Mod: CPTII,S$GLB,,

## 2023-11-08 PROCEDURE — 3078F DIAST BP <80 MM HG: CPT | Mod: CPTII,S$GLB,,

## 2023-11-08 PROCEDURE — 99213 PR OFFICE/OUTPT VISIT, EST, LEVL III, 20-29 MIN: ICD-10-PCS | Mod: S$GLB,,,

## 2023-11-08 PROCEDURE — 1160F PR REVIEW ALL MEDS BY PRESCRIBER/CLIN PHARMACIST DOCUMENTED: ICD-10-PCS | Mod: CPTII,S$GLB,,

## 2023-11-08 PROCEDURE — 99999 PR PBB SHADOW E&M-EST. PATIENT-LVL V: CPT | Mod: PBBFAC,,,

## 2023-11-08 PROCEDURE — 3074F PR MOST RECENT SYSTOLIC BLOOD PRESSURE < 130 MM HG: ICD-10-PCS | Mod: CPTII,S$GLB,,

## 2023-11-08 PROCEDURE — 99213 OFFICE O/P EST LOW 20 MIN: CPT | Mod: S$GLB,,,

## 2023-11-08 PROCEDURE — 3074F SYST BP LT 130 MM HG: CPT | Mod: CPTII,S$GLB,,

## 2023-11-08 PROCEDURE — 3288F PR FALLS RISK ASSESSMENT DOCUMENTED: ICD-10-PCS | Mod: CPTII,S$GLB,,

## 2023-11-08 RX ORDER — DOXYCYCLINE 100 MG/1
100 CAPSULE ORAL 2 TIMES DAILY
Qty: 20 CAPSULE | Refills: 0 | Status: SHIPPED | OUTPATIENT
Start: 2023-11-08 | End: 2023-11-18

## 2023-11-08 RX ORDER — PROMETHAZINE HYDROCHLORIDE AND DEXTROMETHORPHAN HYDROBROMIDE 6.25; 15 MG/5ML; MG/5ML
5 SYRUP ORAL EVERY 4 HOURS PRN
Qty: 118 ML | Refills: 0 | Status: SHIPPED | OUTPATIENT
Start: 2023-11-08 | End: 2023-11-18

## 2023-11-08 NOTE — PATIENT INSTRUCTIONS

## 2023-11-08 NOTE — PROGRESS NOTES
Subjective:       Patient ID: Eva Joseph is a 73 y.o. female.    Chief Complaint: Cough, Headache, and Sinus Problem (Watery eyes//)    Patient presents to the clinic with complaint of cough.     Symptoms started over a week ago with cough, post nasal drip, congestion, sinus pressure/tenderness, and headache. States cough is non-productive. Denies fever. Denies wheezing.   States she took 2 covid tests and they were negative.   She has been taking Allegra D with no relief.     Has no other complaints or concerns today.     Patient educated on plan of care, verbalized understanding.         Review of Systems   Constitutional:  Negative for activity change, appetite change, chills, diaphoresis and fever.   HENT:  Positive for congestion, postnasal drip, sinus pressure, sinus pain and sore throat. Negative for ear pain and sneezing.    Eyes:  Negative for pain, discharge, redness and itching.   Respiratory:  Positive for cough. Negative for apnea, chest tightness, shortness of breath and wheezing.    Cardiovascular:  Negative for chest pain and leg swelling.   Gastrointestinal:  Negative for abdominal distention, abdominal pain, constipation, diarrhea, nausea and vomiting.   Genitourinary:  Negative for difficulty urinating, dysuria, flank pain and frequency.   Skin:  Negative for color change, rash and wound.   Neurological:  Negative for dizziness.   All other systems reviewed and are negative.      Patient Active Problem List   Diagnosis    Osteopenia    Alopecia    Thyroid nodule    Primary hyperparathyroidism    History of breast cancer    History of ductal carcinoma in situ (DCIS) of breast    Aortic atherosclerosis    Thyroid disease    Thyroiditis    Abnormal thyroid blood test    Postmenopausal    Hypertriglyceridemia    Hypovitaminosis D    Contusion of right index finger without damage to nail    COVID-19, breakthrough 12/25/2021    Cardiovascular event risk, ASCVD 10-years risk 16.5%, 2020     Nonspecific abnormal electrocardiogram (ECG) (EKG)    Family history of premature CAD    Elevated blood pressure reading    Agatston coronary artery calcium score between 100 and 199, 155 in 2022    LVH (left ventricular hypertrophy) due to hypertensive disease, without heart failure    Essential hypertension       Objective:      Physical Exam  Vitals and nursing note reviewed.   Constitutional:       General: She is not in acute distress.     Appearance: Normal appearance. She is well-developed.   HENT:      Head: Normocephalic.      Nose: Nose normal.   Eyes:      Conjunctiva/sclera: Conjunctivae normal.      Pupils: Pupils are equal, round, and reactive to light.   Cardiovascular:      Rate and Rhythm: Normal rate and regular rhythm.      Heart sounds: Normal heart sounds.   Pulmonary:      Effort: Pulmonary effort is normal. No respiratory distress.      Breath sounds: Normal breath sounds.   Musculoskeletal:      Cervical back: Normal range of motion and neck supple.   Skin:     General: Skin is warm and dry.      Findings: No rash.   Neurological:      Mental Status: She is alert and oriented to person, place, and time.   Psychiatric:         Behavior: Behavior normal.         Lab Results   Component Value Date    WBC 6.23 07/13/2020    HGB 14.8 07/13/2020    HCT 46.5 07/13/2020     07/13/2020    CHOL 133 10/25/2022    TRIG 48 10/25/2022    HDL 66 10/25/2022    ALT 49 (H) 10/16/2023    AST 28 10/16/2023     10/16/2023    K 3.9 10/16/2023     10/16/2023    CREATININE 0.8 10/16/2023    BUN 15 10/16/2023    CO2 24 10/16/2023    TSH 0.960 10/16/2023     The 10-year ASCVD risk score (Brandon PRUITT, et al., 2019) is: 11.6%    Values used to calculate the score:      Age: 73 years      Sex: Female      Is Non- : No      Diabetic: No      Tobacco smoker: No      Systolic Blood Pressure: 108 mmHg      Is BP treated: Yes      HDL Cholesterol: 66 mg/dL      Total Cholesterol: 133  "mg/dL  Visit Vitals  /69 (BP Location: Right arm, Patient Position: Sitting, BP Method: Medium (Automatic))   Pulse 66   Temp 98 °F (36.7 °C) (Oral)   Ht 5' 3" (1.6 m)   Wt 61.2 kg (134 lb 14.4 oz)   LMP  (LMP Unknown)   SpO2 98%   BMI 23.90 kg/m²      Assessment:       1. Acute non-recurrent pansinusitis    2. Essential hypertension        Plan:       1. Acute non-recurrent pansinusitis  -     doxycycline (VIBRAMYCIN) 100 MG Cap; Take 1 capsule (100 mg total) by mouth 2 (two) times daily. for 10 days  Dispense: 20 capsule; Refill: 0  -     promethazine-dextromethorphan (PROMETHAZINE-DM) 6.25-15 mg/5 mL Syrp; Take 5 mLs by mouth every 4 (four) hours as needed (cough).  Dispense: 118 mL; Refill: 0   - Saline nasal flushes   - Daily Flonase   - The diagnosis, treatment plan, instructions for follow-up and reevaluation as well as ED precautions were discussed and understanding was verbalized. All questions or concerns have been addressed.     2. Essential hypertension   - Stable-Continue medications as prescribed   - Continue current plan of care   - Follow up with PCP       Follow up if symptoms worsen or fail to improve.      Future Appointments       Date Provider Specialty Appt Notes    10/16/2024  Lab lab    10/16/2024  Radiology us    10/23/2024 LEYLA Ghotra PA-C Endocrinology year f/u thyroid             "

## 2023-12-09 ENCOUNTER — NURSE TRIAGE (OUTPATIENT)
Dept: ADMINISTRATIVE | Facility: CLINIC | Age: 73
End: 2023-12-09
Payer: MEDICARE

## 2023-12-09 NOTE — TELEPHONE ENCOUNTER
Pt called reporting she just tested positive for covid via home test. Pt congested, feeling tired, watery eyes, cough, low grade fever, HA. Inquiring about anti-viral medication.    Care advice to speak with PCP within 24 hours. [facility] virtual visit or urgent care recommended. Pt verbalizes understanding.  Reason for Disposition   [1] HIGH RISK patient (e.g., weak immune system, age > 64 years, obesity with BMI 30 or higher, pregnant, chronic lung disease or other chronic medical condition) AND [2] COVID symptoms (e.g., cough, fever)  (Exceptions: Already seen by PCP and no new or worsening symptoms.)    Additional Information   Negative: SEVERE difficulty breathing (e.g., struggling for each breath, speaks in single words)   Negative: Difficult to awaken or acting confused (e.g., disoriented, slurred speech)   Negative: Bluish (or gray) lips or face now   Negative: Shock suspected (e.g., cold/pale/clammy skin, too weak to stand, low BP, rapid pulse)   Negative: Sounds like a life-threatening emergency to the triager   Negative: SEVERE or constant chest pain or pressure  (Exception: Mild central chest pain, present only when coughing.)   Negative: MODERATE difficulty breathing (e.g., speaks in phrases, SOB even at rest, pulse 100-120)   Negative: [1] Headache AND [2] stiff neck (can't touch chin to chest)   Negative: Oxygen level (e.g., pulse oximetry) 90 percent or lower   Negative: Chest pain or pressure  (Exception: MILD central chest pain, present only when coughing.)   Negative: [1] Drinking very little AND [2] dehydration suspected (e.g., no urine > 12 hours, very dry mouth, very lightheaded)   Negative: Patient sounds very sick or weak to the triager   Negative: MILD difficulty breathing (e.g., minimal/no SOB at rest, SOB with walking, pulse <100)   Negative: Fever > 103 F (39.4 C)   Negative: [1] Fever > 101 F (38.3 C) AND [2] age > 60 years   Negative: [1] Fever > 100.0 F (37.8 C) AND [2] bedridden (e.g.,  CVA, chronic illness, recovering from surgery)   Negative: Oxygen level (e.g., pulse oximetry) 91 to 94 percent    Protocols used: Coronavirus (COVID-19) Diagnosed or [id number]

## 2024-02-06 ENCOUNTER — PATIENT MESSAGE (OUTPATIENT)
Dept: ENDOCRINOLOGY | Facility: CLINIC | Age: 74
End: 2024-02-06
Payer: MEDICARE

## 2024-02-06 RX ORDER — ALENDRONATE SODIUM 70 MG/1
TABLET ORAL
Qty: 12 TABLET | Refills: 3 | Status: SHIPPED | OUTPATIENT
Start: 2024-02-06

## 2024-02-15 ENCOUNTER — PATIENT MESSAGE (OUTPATIENT)
Dept: CARDIOLOGY | Facility: CLINIC | Age: 74
End: 2024-02-15
Payer: MEDICARE

## 2024-03-20 ENCOUNTER — TELEPHONE (OUTPATIENT)
Dept: FAMILY MEDICINE | Facility: CLINIC | Age: 74
End: 2024-03-20

## 2024-03-20 ENCOUNTER — LAB VISIT (OUTPATIENT)
Dept: LAB | Facility: HOSPITAL | Age: 74
End: 2024-03-20
Attending: FAMILY MEDICINE
Payer: MEDICARE

## 2024-03-20 ENCOUNTER — OFFICE VISIT (OUTPATIENT)
Dept: FAMILY MEDICINE | Facility: CLINIC | Age: 74
End: 2024-03-20
Payer: MEDICARE

## 2024-03-20 VITALS
BODY MASS INDEX: 24.06 KG/M2 | SYSTOLIC BLOOD PRESSURE: 108 MMHG | HEIGHT: 63 IN | HEART RATE: 62 BPM | DIASTOLIC BLOOD PRESSURE: 60 MMHG | WEIGHT: 135.81 LBS | OXYGEN SATURATION: 99 %

## 2024-03-20 DIAGNOSIS — I10 ESSENTIAL HYPERTENSION: ICD-10-CM

## 2024-03-20 DIAGNOSIS — I70.0 AORTIC ATHEROSCLEROSIS: ICD-10-CM

## 2024-03-20 DIAGNOSIS — I10 ESSENTIAL HYPERTENSION: Primary | ICD-10-CM

## 2024-03-20 DIAGNOSIS — Z85.3 HISTORY OF BREAST CANCER: ICD-10-CM

## 2024-03-20 DIAGNOSIS — I10 ESSENTIAL (PRIMARY) HYPERTENSION: ICD-10-CM

## 2024-03-20 DIAGNOSIS — E21.0 PRIMARY HYPERPARATHYROIDISM: ICD-10-CM

## 2024-03-20 DIAGNOSIS — L29.9 ITCHING: ICD-10-CM

## 2024-03-20 DIAGNOSIS — Z00.00 ANNUAL PHYSICAL EXAM: ICD-10-CM

## 2024-03-20 DIAGNOSIS — E78.2 MIXED HYPERLIPIDEMIA: ICD-10-CM

## 2024-03-20 DIAGNOSIS — E53.8 B12 DEFICIENCY: ICD-10-CM

## 2024-03-20 DIAGNOSIS — R79.9 ABNORMAL FINDING OF BLOOD CHEMISTRY, UNSPECIFIED: ICD-10-CM

## 2024-03-20 DIAGNOSIS — R03.0 ELEVATED BLOOD PRESSURE READING: ICD-10-CM

## 2024-03-20 LAB
ALBUMIN SERPL BCP-MCNC: 4.2 G/DL (ref 3.5–5.2)
ALBUMIN/CREAT UR: 5.7 UG/MG (ref 0–30)
ALP SERPL-CCNC: 69 U/L (ref 55–135)
ALT SERPL W/O P-5'-P-CCNC: 19 U/L (ref 10–44)
ANION GAP SERPL CALC-SCNC: 10 MMOL/L (ref 8–16)
AST SERPL-CCNC: 19 U/L (ref 10–40)
BASOPHILS # BLD AUTO: 0.1 K/UL (ref 0–0.2)
BASOPHILS NFR BLD: 1.6 % (ref 0–1.9)
BILIRUB SERPL-MCNC: 0.8 MG/DL (ref 0.1–1)
BUN SERPL-MCNC: 20 MG/DL (ref 8–23)
CALCIUM SERPL-MCNC: 10.4 MG/DL (ref 8.7–10.5)
CHLORIDE SERPL-SCNC: 103 MMOL/L (ref 95–110)
CHOLEST SERPL-MCNC: 144 MG/DL (ref 120–199)
CHOLEST/HDLC SERPL: 2.4 {RATIO} (ref 2–5)
CO2 SERPL-SCNC: 29 MMOL/L (ref 23–29)
CREAT SERPL-MCNC: 0.8 MG/DL (ref 0.5–1.4)
CREAT UR-MCNC: 87 MG/DL (ref 15–325)
DIFFERENTIAL METHOD BLD: NORMAL
EOSINOPHIL # BLD AUTO: 0.2 K/UL (ref 0–0.5)
EOSINOPHIL NFR BLD: 3.7 % (ref 0–8)
ERYTHROCYTE [DISTWIDTH] IN BLOOD BY AUTOMATED COUNT: 12.4 % (ref 11.5–14.5)
EST. GFR  (NO RACE VARIABLE): >60 ML/MIN/1.73 M^2
ESTIMATED AVG GLUCOSE: 103 MG/DL (ref 68–131)
GLUCOSE SERPL-MCNC: 87 MG/DL (ref 70–110)
HBA1C MFR BLD: 5.2 % (ref 4–5.6)
HCT VFR BLD AUTO: 46.5 % (ref 37–48.5)
HDLC SERPL-MCNC: 60 MG/DL (ref 40–75)
HDLC SERPL: 41.7 % (ref 20–50)
HGB BLD-MCNC: 14.9 G/DL (ref 12–16)
IMM GRANULOCYTES # BLD AUTO: 0.02 K/UL (ref 0–0.04)
IMM GRANULOCYTES NFR BLD AUTO: 0.3 % (ref 0–0.5)
LDLC SERPL CALC-MCNC: 65.8 MG/DL (ref 63–159)
LYMPHOCYTES # BLD AUTO: 1.9 K/UL (ref 1–4.8)
LYMPHOCYTES NFR BLD: 29 % (ref 18–48)
MCH RBC QN AUTO: 28.4 PG (ref 27–31)
MCHC RBC AUTO-ENTMCNC: 32 G/DL (ref 32–36)
MCV RBC AUTO: 89 FL (ref 82–98)
MICROALBUMIN UR DL<=1MG/L-MCNC: 5 UG/ML
MONOCYTES # BLD AUTO: 0.5 K/UL (ref 0.3–1)
MONOCYTES NFR BLD: 7.5 % (ref 4–15)
NEUTROPHILS # BLD AUTO: 3.7 K/UL (ref 1.8–7.7)
NEUTROPHILS NFR BLD: 57.9 % (ref 38–73)
NONHDLC SERPL-MCNC: 84 MG/DL
NRBC BLD-RTO: 0 /100 WBC
PLATELET # BLD AUTO: 258 K/UL (ref 150–450)
PMV BLD AUTO: 10.1 FL (ref 9.2–12.9)
POTASSIUM SERPL-SCNC: 4.5 MMOL/L (ref 3.5–5.1)
PROT SERPL-MCNC: 7.1 G/DL (ref 6–8.4)
RBC # BLD AUTO: 5.25 M/UL (ref 4–5.4)
SODIUM SERPL-SCNC: 142 MMOL/L (ref 136–145)
TRIGL SERPL-MCNC: 91 MG/DL (ref 30–150)
TSH SERPL DL<=0.005 MIU/L-ACNC: 1.33 UIU/ML (ref 0.4–4)
VIT B12 SERPL-MCNC: 585 PG/ML (ref 210–950)
WBC # BLD AUTO: 6.44 K/UL (ref 3.9–12.7)

## 2024-03-20 PROCEDURE — 85025 COMPLETE CBC W/AUTO DIFF WBC: CPT | Performed by: FAMILY MEDICINE

## 2024-03-20 PROCEDURE — 82043 UR ALBUMIN QUANTITATIVE: CPT | Performed by: FAMILY MEDICINE

## 2024-03-20 PROCEDURE — 82607 VITAMIN B-12: CPT | Performed by: FAMILY MEDICINE

## 2024-03-20 PROCEDURE — 3074F SYST BP LT 130 MM HG: CPT | Mod: CPTII,S$GLB,, | Performed by: FAMILY MEDICINE

## 2024-03-20 PROCEDURE — 84443 ASSAY THYROID STIM HORMONE: CPT | Performed by: FAMILY MEDICINE

## 2024-03-20 PROCEDURE — 1126F AMNT PAIN NOTED NONE PRSNT: CPT | Mod: CPTII,S$GLB,, | Performed by: FAMILY MEDICINE

## 2024-03-20 PROCEDURE — 1159F MED LIST DOCD IN RCRD: CPT | Mod: CPTII,S$GLB,, | Performed by: FAMILY MEDICINE

## 2024-03-20 PROCEDURE — 80053 COMPREHEN METABOLIC PANEL: CPT | Performed by: FAMILY MEDICINE

## 2024-03-20 PROCEDURE — 99214 OFFICE O/P EST MOD 30 MIN: CPT | Mod: S$GLB,,, | Performed by: FAMILY MEDICINE

## 2024-03-20 PROCEDURE — 3078F DIAST BP <80 MM HG: CPT | Mod: CPTII,S$GLB,, | Performed by: FAMILY MEDICINE

## 2024-03-20 PROCEDURE — 1101F PT FALLS ASSESS-DOCD LE1/YR: CPT | Mod: CPTII,S$GLB,, | Performed by: FAMILY MEDICINE

## 2024-03-20 PROCEDURE — 80061 LIPID PANEL: CPT | Performed by: FAMILY MEDICINE

## 2024-03-20 PROCEDURE — 3008F BODY MASS INDEX DOCD: CPT | Mod: CPTII,S$GLB,, | Performed by: FAMILY MEDICINE

## 2024-03-20 PROCEDURE — 83036 HEMOGLOBIN GLYCOSYLATED A1C: CPT | Performed by: FAMILY MEDICINE

## 2024-03-20 PROCEDURE — 3288F FALL RISK ASSESSMENT DOCD: CPT | Mod: CPTII,S$GLB,, | Performed by: FAMILY MEDICINE

## 2024-03-20 PROCEDURE — 36415 COLL VENOUS BLD VENIPUNCTURE: CPT | Performed by: FAMILY MEDICINE

## 2024-03-20 PROCEDURE — 99999 PR PBB SHADOW E&M-EST. PATIENT-LVL III: CPT | Mod: PBBFAC,,, | Performed by: FAMILY MEDICINE

## 2024-03-20 RX ORDER — VALSARTAN AND HYDROCHLOROTHIAZIDE 160; 12.5 MG/1; MG/1
1 TABLET, FILM COATED ORAL DAILY
Qty: 90 TABLET | Refills: 3 | Status: SHIPPED | OUTPATIENT
Start: 2024-03-20 | End: 2025-03-20

## 2024-03-20 RX ORDER — HYDROXYZINE HYDROCHLORIDE 10 MG/1
10 TABLET, FILM COATED ORAL 3 TIMES DAILY PRN
Qty: 60 TABLET | Refills: 1 | Status: SHIPPED | OUTPATIENT
Start: 2024-03-20

## 2024-03-20 RX ORDER — ROSUVASTATIN CALCIUM 5 MG/1
5 TABLET, COATED ORAL DAILY
Qty: 90 TABLET | Refills: 3 | Status: SHIPPED | OUTPATIENT
Start: 2024-03-20 | End: 2025-03-20

## 2024-03-20 NOTE — PROGRESS NOTES
Subjective:       Patient ID: Eva Joseph is a 74 y.o. female.    Chief Complaint: Establish Care (Pt presenting to clinic to establish care with new provider)    Ms Ortega is a 73 yo female here to establish care. She was a patient of Dr Marshall and he has moved. She has hypothyroid, hyperparathyroidism, LVH, vit D deficiency, hypertriglyceridemia, aortic calcification, osteopenia and alopecia.       Review of Systems   Constitutional:  Positive for fatigue. Negative for activity change, appetite change, chills, diaphoresis and fever.   HENT:  Negative for congestion, ear pain, postnasal drip, rhinorrhea and sore throat.    Eyes:  Negative for pain, discharge, redness and itching.   Respiratory:  Negative for cough and shortness of breath.    Cardiovascular:  Negative for chest pain, palpitations and leg swelling.   Gastrointestinal:  Negative for abdominal distention, abdominal pain, constipation, diarrhea and nausea.   Genitourinary:  Negative for difficulty urinating, dysuria, frequency and urgency.   Skin:  Negative for color change, rash and wound.        Hair loss   Neurological:  Positive for headaches.   Psychiatric/Behavioral:  Positive for sleep disturbance.    All other systems reviewed and are negative.      Patient Active Problem List   Diagnosis    Osteopenia    Alopecia    Thyroid nodule    Primary hyperparathyroidism    History of breast cancer    History of ductal carcinoma in situ (DCIS) of breast    Aortic atherosclerosis    Thyroid disease    Thyroiditis    Abnormal thyroid blood test    Postmenopausal    Hypertriglyceridemia    Hypovitaminosis D    Contusion of right index finger without damage to nail    COVID-19, breakthrough 12/25/2021    Cardiovascular event risk, ASCVD 10-years risk 16.5%, 2020    Nonspecific abnormal electrocardiogram (ECG) (EKG)    Family history of premature CAD    Elevated blood pressure reading    Agatston coronary artery calcium score between 100 and 199, 155  "in 2022    LVH (left ventricular hypertrophy) due to hypertensive disease, without heart failure    Essential hypertension       Objective:      Physical Exam    Lab Results   Component Value Date    WBC 6.23 07/13/2020    HGB 14.8 07/13/2020    HCT 46.5 07/13/2020     07/13/2020    CHOL 133 10/25/2022    TRIG 48 10/25/2022    HDL 66 10/25/2022    ALT 49 (H) 10/16/2023    AST 28 10/16/2023     10/16/2023    K 3.9 10/16/2023     10/16/2023    CREATININE 0.8 10/16/2023    BUN 15 10/16/2023    CO2 24 10/16/2023    TSH 0.960 10/16/2023     The 10-year ASCVD risk score (Brandon PRUITT, et al., 2019) is: 13.2%    Values used to calculate the score:      Age: 74 years      Sex: Female      Is Non- : No      Diabetic: No      Tobacco smoker: No      Systolic Blood Pressure: 108 mmHg      Is BP treated: Yes      HDL Cholesterol: 66 mg/dL      Total Cholesterol: 133 mg/dL  Visit Vitals  /60 (BP Location: Right arm, Patient Position: Sitting, BP Method: Medium (Manual))   Pulse 62   Ht 5' 3" (1.6 m)   Wt 61.6 kg (135 lb 12.8 oz)   LMP  (LMP Unknown)   SpO2 99%   BMI 24.06 kg/m²      Assessment:       1. Essential hypertension    2. Annual physical exam    3. History of breast cancer    4. Primary hyperparathyroidism    5. Mixed hyperlipidemia    6. B12 deficiency    7. Abnormal finding of blood chemistry, unspecified    8. Essential (primary) hypertension    9. Aortic atherosclerosis    10. Itching    11. Elevated blood pressure reading        Plan:       1. Essential hypertension  -     CBC Auto Differential; Future; Expected date: 03/20/2024  -     Comprehensive Metabolic Panel; Future; Expected date: 03/20/2024  -     Microalbumin/Creatinine Ratio, Urine; Future; Expected date: 03/20/2024  -     valsartan-hydrochlorothiazide (DIOVAN-HCT) 160-12.5 mg per tablet; Take 1 tablet by mouth once daily.  Dispense: 90 tablet; Refill: 3    2. Annual physical exam  -     Lipid Panel; Future; " Expected date: 03/20/2024  -     CBC Auto Differential; Future; Expected date: 03/20/2024  -     Comprehensive Metabolic Panel; Future; Expected date: 03/20/2024  -     Hemoglobin A1C; Future; Expected date: 03/20/2024  -     TSH; Future; Expected date: 03/20/2024  -     Vitamin B12; Future; Expected date: 03/20/2024  -     Microalbumin/Creatinine Ratio, Urine; Future; Expected date: 03/20/2024    3. History of breast cancer sp mastectomy and reconstruction    4. Primary hyperparathyroidism  -     Hemoglobin A1C; Future; Expected date: 03/20/2024  -     TSH; Future; Expected date: 03/20/2024    5. Mixed hyperlipidemia  -     Lipid Panel; Future; Expected date: 03/20/2024    6. B12 deficiency  -     Vitamin B12; Future; Expected date: 03/20/2024    7. Abnormal finding of blood chemistry, unspecified  -     Hemoglobin A1C; Future; Expected date: 03/20/2024    8. Essential (primary) hypertension  -     TSH; Future; Expected date: 03/20/2024    9. Aortic atherosclerosis  Comments:  stable, followed by Dr Vinny Gomez at [facility]  Overview:  Noted on CT abd from 11/4/2010      10. Itching  -     hydrOXYzine HCL (ATARAX) 10 MG Tab; Take 1 tablet (10 mg total) by mouth 3 (three) times daily as needed.  Dispense: 60 tablet; Refill: 1    11. Elevated blood pressure reading  -     rosuvastatin (CRESTOR) 5 MG tablet; Take 1 tablet (5 mg total) by mouth once daily.  Dispense: 90 tablet; Refill: 3       Follow up in about 6 months (around 9/20/2024), or if symptoms worsen or fail to improve.      Future Appointments       Date Provider Specialty Appt Notes    4/24/2024 Vinny Gomez MD Cardiology Yearly check    7/8/2024 Fani Frazier MD Dermatology Dark spot on face and skin cancer screaning    10/16/2024  Lab lab    10/16/2024  Radiology us    10/23/2024 LEYLA Ghotra PA-C Endocrinology year f/u thyroid

## 2024-04-06 ENCOUNTER — HOSPITAL ENCOUNTER (EMERGENCY)
Facility: HOSPITAL | Age: 74
Discharge: HOME | End: 2024-04-06
Attending: EMERGENCY MEDICINE
Payer: MEDICARE

## 2024-04-06 VITALS
HEIGHT: 63 IN | HEART RATE: 66 BPM | TEMPERATURE: 98 F | WEIGHT: 136 LBS | DIASTOLIC BLOOD PRESSURE: 68 MMHG | OXYGEN SATURATION: 100 % | RESPIRATION RATE: 18 BRPM | BODY MASS INDEX: 24.1 KG/M2 | SYSTOLIC BLOOD PRESSURE: 130 MMHG

## 2024-04-06 DIAGNOSIS — R07.89 ATYPICAL CHEST PAIN: Primary | ICD-10-CM

## 2024-04-06 DIAGNOSIS — K80.20 SYMPTOMATIC CHOLELITHIASIS: ICD-10-CM

## 2024-04-06 DIAGNOSIS — R10.11 RUQ PAIN: ICD-10-CM

## 2024-04-06 DIAGNOSIS — R07.9 CHEST PAIN: ICD-10-CM

## 2024-04-06 LAB
ALBUMIN SERPL BCP-MCNC: 4 G/DL (ref 3.5–5.2)
ALP SERPL-CCNC: 65 U/L (ref 55–135)
ALT SERPL W/O P-5'-P-CCNC: 37 U/L (ref 10–44)
ANION GAP SERPL CALC-SCNC: 11 MMOL/L (ref 8–16)
AST SERPL-CCNC: 49 U/L (ref 10–40)
BASOPHILS # BLD AUTO: 0.07 K/UL (ref 0–0.2)
BASOPHILS NFR BLD: 0.5 % (ref 0–1.9)
BILIRUB SERPL-MCNC: 0.9 MG/DL (ref 0.1–1)
BNP SERPL-MCNC: 52 PG/ML (ref 0–99)
BUN SERPL-MCNC: 19 MG/DL (ref 8–23)
CALCIUM SERPL-MCNC: 10 MG/DL (ref 8.7–10.5)
CHLORIDE SERPL-SCNC: 105 MMOL/L (ref 95–110)
CO2 SERPL-SCNC: 27 MMOL/L (ref 23–29)
CREAT SERPL-MCNC: 0.8 MG/DL (ref 0.5–1.4)
DIFFERENTIAL METHOD BLD: ABNORMAL
EOSINOPHIL # BLD AUTO: 0.2 K/UL (ref 0–0.5)
EOSINOPHIL NFR BLD: 1.3 % (ref 0–8)
ERYTHROCYTE [DISTWIDTH] IN BLOOD BY AUTOMATED COUNT: 12.5 % (ref 11.5–14.5)
EST. GFR  (NO RACE VARIABLE): >60 ML/MIN/1.73 M^2
GLUCOSE SERPL-MCNC: 137 MG/DL (ref 70–110)
HCT VFR BLD AUTO: 40.7 % (ref 37–48.5)
HGB BLD-MCNC: 13.5 G/DL (ref 12–16)
IMM GRANULOCYTES # BLD AUTO: 0.06 K/UL (ref 0–0.04)
IMM GRANULOCYTES NFR BLD AUTO: 0.5 % (ref 0–0.5)
INR PPP: 1.1 (ref 0.8–1.2)
LYMPHOCYTES # BLD AUTO: 1.9 K/UL (ref 1–4.8)
LYMPHOCYTES NFR BLD: 14.5 % (ref 18–48)
MCH RBC QN AUTO: 28.9 PG (ref 27–31)
MCHC RBC AUTO-ENTMCNC: 33.2 G/DL (ref 32–36)
MCV RBC AUTO: 87 FL (ref 82–98)
MONOCYTES # BLD AUTO: 0.7 K/UL (ref 0.3–1)
MONOCYTES NFR BLD: 5.3 % (ref 4–15)
NEUTROPHILS # BLD AUTO: 10.2 K/UL (ref 1.8–7.7)
NEUTROPHILS NFR BLD: 77.9 % (ref 38–73)
NRBC BLD-RTO: 0 /100 WBC
PLATELET # BLD AUTO: 230 K/UL (ref 150–450)
PMV BLD AUTO: 10.2 FL (ref 9.2–12.9)
POTASSIUM SERPL-SCNC: 3.7 MMOL/L (ref 3.5–5.1)
PROT SERPL-MCNC: 6.7 G/DL (ref 6–8.4)
PROTHROMBIN TIME: 11.1 SEC (ref 9–12.5)
RBC # BLD AUTO: 4.67 M/UL (ref 4–5.4)
SODIUM SERPL-SCNC: 143 MMOL/L (ref 136–145)
TROPONIN I SERPL DL<=0.01 NG/ML-MCNC: 0.01 NG/ML (ref 0–0.03)
TROPONIN I SERPL DL<=0.01 NG/ML-MCNC: 0.01 NG/ML (ref 0–0.03)
WBC # BLD AUTO: 13.08 K/UL (ref 3.9–12.7)

## 2024-04-06 PROCEDURE — 94760 N-INVAS EAR/PLS OXIMETRY 1: CPT

## 2024-04-06 PROCEDURE — 85610 PROTHROMBIN TIME: CPT | Performed by: EMERGENCY MEDICINE

## 2024-04-06 PROCEDURE — 25000003 PHARM REV CODE 250: Performed by: EMERGENCY MEDICINE

## 2024-04-06 PROCEDURE — 99285 EMERGENCY DEPT VISIT HI MDM: CPT | Mod: 25

## 2024-04-06 PROCEDURE — 74174 CTA ABD&PLVS W/CONTRAST: CPT | Mod: 26,,, | Performed by: RADIOLOGY

## 2024-04-06 PROCEDURE — 85025 COMPLETE CBC W/AUTO DIFF WBC: CPT | Performed by: EMERGENCY MEDICINE

## 2024-04-06 PROCEDURE — 99900035 HC TECH TIME PER 15 MIN (STAT)

## 2024-04-06 PROCEDURE — 74174 CTA ABD&PLVS W/CONTRAST: CPT | Mod: TC

## 2024-04-06 PROCEDURE — 93010 ELECTROCARDIOGRAM REPORT: CPT | Mod: ,,, | Performed by: INTERNAL MEDICINE

## 2024-04-06 PROCEDURE — 25500020 PHARM REV CODE 255: Performed by: EMERGENCY MEDICINE

## 2024-04-06 PROCEDURE — 80053 COMPREHEN METABOLIC PANEL: CPT | Performed by: EMERGENCY MEDICINE

## 2024-04-06 PROCEDURE — 93005 ELECTROCARDIOGRAM TRACING: CPT

## 2024-04-06 PROCEDURE — 84484 ASSAY OF TROPONIN QUANT: CPT | Performed by: EMERGENCY MEDICINE

## 2024-04-06 PROCEDURE — 83880 ASSAY OF NATRIURETIC PEPTIDE: CPT | Performed by: EMERGENCY MEDICINE

## 2024-04-06 PROCEDURE — 71275 CT ANGIOGRAPHY CHEST: CPT | Mod: 26,,, | Performed by: RADIOLOGY

## 2024-04-06 PROCEDURE — 71045 X-RAY EXAM CHEST 1 VIEW: CPT | Mod: TC

## 2024-04-06 PROCEDURE — 71045 X-RAY EXAM CHEST 1 VIEW: CPT | Mod: 26,,, | Performed by: RADIOLOGY

## 2024-04-06 PROCEDURE — 99406 BEHAV CHNG SMOKING 3-10 MIN: CPT

## 2024-04-06 PROCEDURE — 36415 COLL VENOUS BLD VENIPUNCTURE: CPT | Performed by: EMERGENCY MEDICINE

## 2024-04-06 RX ORDER — ASPIRIN 325 MG
325 TABLET ORAL
Status: COMPLETED | OUTPATIENT
Start: 2024-04-06 | End: 2024-04-06

## 2024-04-06 RX ORDER — TRAMADOL HYDROCHLORIDE 50 MG/1
50 TABLET ORAL EVERY 6 HOURS PRN
Qty: 18 TABLET | Refills: 0 | Status: SHIPPED | OUTPATIENT
Start: 2024-04-06 | End: 2024-04-13

## 2024-04-06 RX ADMIN — ASPIRIN 325 MG ORAL TABLET 325 MG: 325 PILL ORAL at 06:04

## 2024-04-06 RX ADMIN — IOHEXOL 100 ML: 350 INJECTION, SOLUTION INTRAVENOUS at 04:04

## 2024-04-06 NOTE — ED NOTES
Patient states she is in no pain . Denies nausea. Vital signs are within acceptable parameters. She is updated as to plan of care ,which is to repeat her troponin. If it is WNL she will be discharged and given a follow up for ERCP.

## 2024-04-06 NOTE — RESPIRATORY THERAPY
04/06/24 1356   Patient Assessment/Suction   Level of Consciousness (AVPU) alert   Respiratory Effort Normal;Unlabored   Expansion/Accessory Muscles/Retractions no retractions;no use of accessory muscles   All Lung Fields Breath Sounds Anterior:;Posterior:;Lateral:;equal bilaterally;clear   Rhythm/Pattern, Respiratory depth regular;pattern regular;unlabored   Cough Frequency frequent   PRE-TX-O2   Device (Oxygen Therapy) room air   SpO2 95 %   Pulse Oximetry Type Continuous   $ Pulse Oximetry - Single Charge Pulse Oximetry - Single   Pulse (!) 45   Resp 17   Tobacco Cessation Intervention   Do you use any type of tobacco product? No   $ Smoking Cessation Charges Smoking Cessation - Intermediate (Non-CTTS)   Respiratory Evaluation   $ Care Plan Tech Time 15 min   Evaluation For New Orders   Home Oxygen   Has Home Oxygen? No   Home Aerosol, MDI, DPI, and Other Treatments/Therapies   Home Respiratory Therapy Per Patient/Review of Chart No   Oxygen Care Plan   Rationale No rational found   Bronchodilator Care Plan   Rationale No Rationale found   Atelectasis Care Plan   Rationale No Rational Found   Airway Clearance Care Plan   Rationale No rationale found

## 2024-04-07 NOTE — ED PROVIDER NOTES
History     Chief Complaint   Patient presents with    Chest Pain     Pt c/o chest pain that started an hour ago.     HPI:  Eva Joseph is a 74 y.o. female with PMH as below who presents to the [facility] emergency department for evaluation of chest pain onset one hour ago that is severe, sharp, radiates from right side of chest to left and goes to her back.     She reports that her sister had similar symptoms and was found to have decreased gallbladder EF on HIDA scan and improved with cholecystectomy. Patient has has had GB US which showed only a few small stones so opted out of cholecystectomy in the past but now is interested.     PCP: Briseyda Quiñones MD    Review of patient's allergies indicates:   Allergen Reactions    Sulfa (sulfonamide antibiotics) Other (See Comments)     Other reaction(s): Swelling      Past Medical History:   Diagnosis Date    Breast cancer 2000    also in 2013    Depression     Essential hypertension 5/18/2022    Hypertriglyceridemia 7/21/2019    Nephrolithiasis 2/5/2015    Osteopenia      Past Surgical History:   Procedure Laterality Date    ADENOIDECTOMY      BILATERAL OOPHORECTOMY      benign    BREAST LUMPECTOMY  2000    Right breast    BREAST RECONSTRUCTION Bilateral 2013    Femoral hernia  2010    right side repair    HEMORRHOID SURGERY      HYSTERECTOMY  1998    KELSEY, fibroid    MASTECTOMY  2013     bilateral     OOPHORECTOMY      TONSILLECTOMY         Family History   Problem Relation Age of Onset    Breast cancer Mother         Breast cancer-75yrs    Hypertension Father     Heart disease Father 56        MI    Cancer Maternal Aunt         ovarian    Ovarian cancer Maternal Aunt     Cancer Maternal Grandfather         testicular / prostate    Stroke Paternal Grandmother     Breast cancer Maternal Grandmother         Breast cancer-42yrs    Parkinsonism Paternal Grandfather     No Known Problems Sister     Skin cancer Brother     Hyperlipidemia Son         As a child     No Known Problems Daughter     No Known Problems Daughter     No Known Problems Sister     No Known Problems Brother     Amblyopia Neg Hx     Blindness Neg Hx     Cataracts Neg Hx     Diabetes Neg Hx     Glaucoma Neg Hx     Macular degeneration Neg Hx     Retinal detachment Neg Hx     Strabismus Neg Hx     Thyroid disease Neg Hx     Colon cancer Neg Hx     Melanoma Neg Hx      Social History     Tobacco Use    Smoking status: Never    Smokeless tobacco: Never   Substance and Sexual Activity    Alcohol use: Yes     Alcohol/week: 1.0 standard drink of alcohol     Types: 1 Glasses of wine per week     Comment: Occasionally    Drug use: No    Sexual activity: Yes     Partners: Male     Birth control/protection: Post-menopausal     Comment:       Review of Systems     Review of Systems   Constitutional: Negative.  Negative for chills and fever.   HENT: Negative.     Eyes: Negative.    Respiratory: Negative.     Cardiovascular: Negative.    Gastrointestinal: Negative.  Negative for abdominal pain, nausea and vomiting.   Endocrine: Negative.    Genitourinary: Negative.    Musculoskeletal: Negative.    Skin: Negative.    Allergic/Immunologic: Negative.    Neurological: Negative.    Hematological: Negative.    Psychiatric/Behavioral: Negative.     All other systems reviewed and are negative.       Physical Exam     Initial Vitals [04/06/24 1314]   BP Pulse Resp Temp SpO2   (!) 156/74 (!) 47 14 97.5 °F (36.4 °C) 97 %      MAP       --          Nursing notes and vital signs reviewed.  Constitutional: Patient is in mild to moderate acute distress.   Head: Normocephalic. Atraumatic.   Eyes:  Conjunctivae are not pale. No scleral icterus.   ENT: Mucous membranes moist.   Neck: Supple.   Cardiovascular: Bradycardic. Regular rhythm. No murmurs, rubs, or gallops. No chest tenderness.   Pulmonary: No respiratory distress. Clear to auscultation bilaterally   Abdominal: Soft. Non-distended. Nontender. Neg Moreland's.  "  Musculoskeletal: Moves all extremities. No obvious deformities.   Skin: Warm and dry.   Neurological:  Alert, awake, and appropriate. Normal speech. No acute lateralizing neurologic deficits appreciated.   Psychiatric: Normal affect.       ED Course   Procedures  Vitals:    04/06/24 1314 04/06/24 1356 04/06/24 1433 04/06/24 1503   BP: (!) 156/74  132/64 134/65   Pulse: (!) 47 (!) 45 60 65   Resp: 14 17  18   Temp: 97.5 °F (36.4 °C)      TempSrc: Oral      SpO2: 97% 95% 99% 99%   Weight: 61.7 kg (136 lb)      Height: 5' 3" (1.6 m)       04/06/24 1540 04/06/24 1633 04/06/24 1802 04/06/24 1824   BP: 138/63 134/63 130/68 130/68   Pulse: 61 62 65 66   Resp: 11 (!) 23  18   Temp:    98 °F (36.7 °C)   TempSrc:       SpO2: 99% 99% 97% 100%   Weight:       Height:         Lab Results Interpreted as Abnormal:  Labs Reviewed   CBC W/ AUTO DIFFERENTIAL - Abnormal; Notable for the following components:       Result Value    WBC 13.08 (*)     Gran # (ANC) 10.2 (*)     Immature Grans (Abs) 0.06 (*)     Gran % 77.9 (*)     Lymph % 14.5 (*)     All other components within normal limits   COMPREHENSIVE METABOLIC PANEL - Abnormal; Notable for the following components:    Glucose 137 (*)     AST 49 (*)     All other components within normal limits   B-TYPE NATRIURETIC PEPTIDE   TROPONIN I   PROTIME-INR   TROPONIN I      All Lab Results:  Results for orders placed or performed during the hospital encounter of 04/06/24   CBC auto differential   Result Value Ref Range    WBC 13.08 (H) 3.90 - 12.70 K/uL    RBC 4.67 4.00 - 5.40 M/uL    Hemoglobin 13.5 12.0 - 16.0 g/dL    Hematocrit 40.7 37.0 - 48.5 %    MCV 87 82 - 98 fL    MCH 28.9 27.0 - 31.0 pg    MCHC 33.2 32.0 - 36.0 g/dL    RDW 12.5 11.5 - 14.5 %    Platelets 230 150 - 450 K/uL    MPV 10.2 9.2 - 12.9 fL    Immature Granulocytes 0.5 0.0 - 0.5 %    Gran # (ANC) 10.2 (H) 1.8 - 7.7 K/uL    Immature Grans (Abs) 0.06 (H) 0.00 - 0.04 K/uL    Lymph # 1.9 1.0 - 4.8 K/uL    Mono # 0.7 0.3 - " 1.0 K/uL    Eos # 0.2 0.0 - 0.5 K/uL    Baso # 0.07 0.00 - 0.20 K/uL    nRBC 0 0 /100 WBC    Gran % 77.9 (H) 38.0 - 73.0 %    Lymph % 14.5 (L) 18.0 - 48.0 %    Mono % 5.3 4.0 - 15.0 %    Eosinophil % 1.3 0.0 - 8.0 %    Basophil % 0.5 0.0 - 1.9 %    Differential Method Automated    Comprehensive metabolic panel   Result Value Ref Range    Sodium 143 136 - 145 mmol/L    Potassium 3.7 3.5 - 5.1 mmol/L    Chloride 105 95 - 110 mmol/L    CO2 27 23 - 29 mmol/L    Glucose 137 (H) 70 - 110 mg/dL    BUN 19 8 - 23 mg/dL    Creatinine 0.8 0.5 - 1.4 mg/dL    Calcium 10.0 8.7 - 10.5 mg/dL    Total Protein 6.7 6.0 - 8.4 g/dL    Albumin 4.0 3.5 - 5.2 g/dL    Total Bilirubin 0.9 0.1 - 1.0 mg/dL    Alkaline Phosphatase 65 55 - 135 U/L    AST 49 (H) 10 - 40 U/L    ALT 37 10 - 44 U/L    eGFR >60.0 >60 mL/min/1.73 m^2    Anion Gap 11 8 - 16 mmol/L   Brain natriuretic peptide   Result Value Ref Range    BNP 52 0 - 99 pg/mL   Troponin I   Result Value Ref Range    Troponin I 0.012 0.000 - 0.026 ng/mL   Protime-INR   Result Value Ref Range    Prothrombin Time 11.1 9.0 - 12.5 sec    INR 1.1 0.8 - 1.2   Troponin I   Result Value Ref Range    Troponin I 0.006 0.000 - 0.026 ng/mL     Imaging Results              CTA Chest Abdomen Pelvis (Final result)  Result time 04/06/24 16:44:32      Final result by Sofie Wilde MD (04/06/24 16:44:32)                   Impression:      There is no evidence aortic dissection.      Electronically signed by: Sofie Wilde MD  Date:    04/06/2024  Time:    16:44               Narrative:    EXAMINATION:  CTA CHEST ABDOMEN PELVIS    CLINICAL HISTORY:  Aortic dissection suspected;    TECHNIQUE:  CTA chest, abdomen pelvis was acquired.    There    COMPARISON:  None    FINDINGS:  There is no evidence thoracic aortic dissection.  Minimi al calcific plaque is seen within the thoracic aorta.  The major vascular structures of the chest show a normal contrasted appearance.    Atherosclerotic disease is seen  within the abdominal aorta.  There is no evidence of abdominal aortic dissection.  There is no evidence of abdominal aortic aneurysm.  There is no evidence of abdominal nor pelvic lymphadenopathy.    The liver, spleen, adrenal glands, and pancreas show normal contrasted appearance.  There is cholelithiasis.    There is no evidence bowel obstruction.  Stool is seen throughout the colon.  There is no evidence abdominal free fluid or free air.  The osseous structures are unremarkable.    There is no evidence pneumothorax, pleural effusion or focal consolidation.  The the thyroid is unremarkable.                                       X-Ray Chest AP Portable (Final result)  Result time 04/06/24 14:23:37   Procedure changed from X-Ray Chest PA And Lateral     Final result by Sofie Wilde MD (04/06/24 14:23:37)                   Impression:      .  There is no evidence acute pulmonary disease.      Electronically signed by: Sofie Wilde MD  Date:    04/06/2024  Time:    14:23               Narrative:    EXAMINATION:  XR CHEST AP PORTABLE    CLINICAL HISTORY:  Chest Pain;    TECHNIQUE:  Single frontal view of the chest was performed.    COMPARISON:  06/09/2023    FINDINGS:  There is no pneumothorax, pleural effusion or focal consolidation.  The cardiac silhouette is within normal limits in the trachea is midline                                     ED Physician's independent review of the above imaging: agree with radiologist; cholelithiasis noted.    The EKG was ordered, reviewed, and independently interpreted by the ED Physician:  Rhythm: sinus bradycardia   Rate: 47 bpm  No ST-T changes concerning for acute ischemia    The emergency physician reviewed the vital signs and test results, which are outlined above.     ED Discussion      Patient referred to outpatient surgery to discuss gallbladder as possible source of pain. Patient currently nontender, afebrile, no N/V, and labs not suggestive of biliary  obstruction. Return precautions given.     Patient's evaluation in the ED does not suggest any emergent or life-threatening medical conditions requiring immediate intervention beyond what was provided in the ED, and I believe patient is safe for discharge. Regardless, an unremarkable evaluation in the ED does not preclude the development or presence of a serious or life-threatening condition. As such, patient was given return instructions for any change or worsening of symptoms.       ED Medication(s) Administered:  Medications   iohexoL (OMNIPAQUE 350) injection 100 mL (100 mLs Intravenous Given 4/6/24 1640)   aspirin tablet 325 mg (325 mg Oral Given 4/6/24 1800)       Prescription Management: I performed a review of the patient's current Rx medication list as input by nursing staff.    Discharge Medication List as of 4/6/2024  6:10 PM        START taking these medications    Details   traMADoL (ULTRAM) 50 mg tablet Take 1 tablet (50 mg total) by mouth every 6 (six) hours as needed (pain not relieved by over-the-counter medications)., Starting Sat 4/6/2024, Until Sat 4/13/2024 at 2359, Print           CONTINUE these medications which have NOT CHANGED    Details   alendronate (FOSAMAX) 70 MG tablet Take one tablet every 7 days., Normal      amino acids-minerals Tab Take 2 tablets by mouth 3 (three) times daily., Historical Med      ascorbic acid, vitamin C, (VITAMIN C) 100 MG tablet   Daily, 0 Refill(s), Maintenance, Historical Med      aspirin (ECOTRIN) 81 MG EC tablet Take 1 tablet (81 mg total) by mouth once daily., Starting Thu 10/27/2022, Until Wed 3/20/2024, No Print      betamethasone dipropionate (DIPROLENE) 0.05 % lotion Apply topically 2 (two) times daily., Starting Tue 9/13/2022, Normal      coenzyme Q10-vitamin E 100-100 mg-unit Cap Take by mouth. 1 Capsule Oral Every day, Historical Med      fish oil-dha-epa 1,200-144-216 mg Cap Take by mouth. 1 Capsule Oral Every day, Until Discontinued, Historical Med       glucosamine-chondroitin 500-400 mg tablet Take 1 tablet by mouth 3 (three) times daily., Historical Med      hydrOXYzine HCL (ATARAX) 10 MG Tab Take 1 tablet (10 mg total) by mouth 3 (three) times daily as needed., Starting Wed 3/20/2024, Normal      ketoconazole (NIZORAL) 2 % shampoo Apply topically twice a week., Starting Mon 2/27/2023, Normal      minoxidiL (LONITEN) 2.5 MG tablet Take 1/2 tablet PO QHS, Normal      multivitamin (THERAGRAN) per tablet Take by mouth. 1 Tablet Oral Every day, Until Discontinued, Historical Med      nitroGLYCERIN (NITROSTAT) 0.4 MG SL tablet Place 1 tablet (0.4 mg total) under the tongue every 5 (five) minutes as needed for Chest pain., Starting Sat 7/29/2023, Until Sun 7/28/2024 at 2359, Normal      rosuvastatin (CRESTOR) 5 MG tablet Take 1 tablet (5 mg total) by mouth once daily., Starting Wed 3/20/2024, Until Thu 3/20/2025, Normal      valsartan-hydrochlorothiazide (DIOVAN-HCT) 160-12.5 mg per tablet Take 1 tablet by mouth once daily., Starting Wed 3/20/2024, Until Thu 3/20/2025, Normal      vitamin D3-vitamin K2 1,250-200 mcg Cap   0 Refill(s), Historical Med               Follow-up Information       Briseyda Quiñones MD. Schedule an appointment as soon as possible for a visit in 3 days.    Specialty: Family Medicine  Contact information:  [address] [id number] [phone]               Schedule an appointment as soon as possible for a visit  with Jordin Lewis MD.    Specialty: General Surgery  Why: to consider extended gallbladder workup / HIDA scan  Contact information:  [address] [facility]  [address] [facility] [address] [phone]               [facility] Emergency Dept.    Specialty: Emergency Medicine  Why: As needed, If symptoms worsen  Contact information:  [address] [facility] [address] [phone]                          Clinical Impression       ICD-10-CM ICD-9-CM   1. Atypical chest pain  R07.89 786.59    2. Chest pain  R07.9 786.50   3. RUQ pain  R10.11 789.01   4. Symptomatic cholelithiasis  K80.20 574.20      ED Disposition Condition    Discharge Stable             Sergey Alvarenga MD  04/07/24 0902

## 2024-04-08 ENCOUNTER — PATIENT MESSAGE (OUTPATIENT)
Dept: FAMILY MEDICINE | Facility: CLINIC | Age: 74
End: 2024-04-08
Payer: MEDICARE

## 2024-04-08 LAB
OHS QRS DURATION: 102 MS
OHS QTC CALCULATION: 399 MS

## 2024-04-24 ENCOUNTER — OFFICE VISIT (OUTPATIENT)
Dept: CARDIOLOGY | Facility: CLINIC | Age: 74
End: 2024-04-24
Payer: MEDICARE

## 2024-04-24 VITALS
DIASTOLIC BLOOD PRESSURE: 70 MMHG | HEIGHT: 63 IN | OXYGEN SATURATION: 99 % | WEIGHT: 138.69 LBS | HEART RATE: 67 BPM | BODY MASS INDEX: 24.57 KG/M2 | SYSTOLIC BLOOD PRESSURE: 120 MMHG

## 2024-04-24 DIAGNOSIS — Z91.89 CARDIOVASCULAR EVENT RISK: ICD-10-CM

## 2024-04-24 DIAGNOSIS — R94.31 NONSPECIFIC ABNORMAL ELECTROCARDIOGRAM (ECG) (EKG): ICD-10-CM

## 2024-04-24 DIAGNOSIS — I70.0 AORTIC ATHEROSCLEROSIS: ICD-10-CM

## 2024-04-24 DIAGNOSIS — R93.1 AGATSTON CORONARY ARTERY CALCIUM SCORE BETWEEN 100 AND 199: Primary | ICD-10-CM

## 2024-04-24 DIAGNOSIS — I10 ESSENTIAL HYPERTENSION: ICD-10-CM

## 2024-04-24 DIAGNOSIS — I11.9 LVH (LEFT VENTRICULAR HYPERTROPHY) DUE TO HYPERTENSIVE DISEASE, WITHOUT HEART FAILURE: ICD-10-CM

## 2024-04-24 PROCEDURE — 1126F AMNT PAIN NOTED NONE PRSNT: CPT | Mod: CPTII,S$GLB,, | Performed by: INTERNAL MEDICINE

## 2024-04-24 PROCEDURE — 1101F PT FALLS ASSESS-DOCD LE1/YR: CPT | Mod: CPTII,S$GLB,, | Performed by: INTERNAL MEDICINE

## 2024-04-24 PROCEDURE — 3288F FALL RISK ASSESSMENT DOCD: CPT | Mod: CPTII,S$GLB,, | Performed by: INTERNAL MEDICINE

## 2024-04-24 PROCEDURE — 99999 PR PBB SHADOW E&M-EST. PATIENT-LVL IV: CPT | Mod: PBBFAC,,, | Performed by: INTERNAL MEDICINE

## 2024-04-24 PROCEDURE — 3074F SYST BP LT 130 MM HG: CPT | Mod: CPTII,S$GLB,, | Performed by: INTERNAL MEDICINE

## 2024-04-24 PROCEDURE — 3044F HG A1C LEVEL LT 7.0%: CPT | Mod: CPTII,S$GLB,, | Performed by: INTERNAL MEDICINE

## 2024-04-24 PROCEDURE — 99214 OFFICE O/P EST MOD 30 MIN: CPT | Mod: S$GLB,,, | Performed by: INTERNAL MEDICINE

## 2024-04-24 PROCEDURE — 1159F MED LIST DOCD IN RCRD: CPT | Mod: CPTII,S$GLB,, | Performed by: INTERNAL MEDICINE

## 2024-04-24 PROCEDURE — 3066F NEPHROPATHY DOC TX: CPT | Mod: CPTII,S$GLB,, | Performed by: INTERNAL MEDICINE

## 2024-04-24 PROCEDURE — 3078F DIAST BP <80 MM HG: CPT | Mod: CPTII,S$GLB,, | Performed by: INTERNAL MEDICINE

## 2024-04-24 PROCEDURE — 3061F NEG MICROALBUMINURIA REV: CPT | Mod: CPTII,S$GLB,, | Performed by: INTERNAL MEDICINE

## 2024-04-28 NOTE — PROGRESS NOTES
Subjective:   Chief Complaint: Hypertension  Last Clinic Visit: 10/27/2022     History of Present Illness: Eva Joseph is a 74 y.o. lady with hypertension, hyperlipidemia, family history of atherosclerosis, aortic atherosclerosis, coronary artery disease by calcification, who presents to re-establish cardiology care she most recently saw Dr. Whittington on the [facility].    Interval History:  We saw her once in 2023 with fellow, doing well at that time, in 2022 when we saw her we swapped out losartan for valsartan/hydrochlorothiazide and blood pressure has been doing better on this.  Blood pressure today well-controlled 120/70.  She did have an episode recently where she developed right shoulder pain, and a pressure across her chest, happening after a meal, and came in for workup in based [place], had EKG, and other subsequent testing ultimately was found to have some gallstones evaluated by surgery, and no indication for surgery, has not had any recent episodes symptoms have been improving.  She had a CTA which also ruled out dissection and showed known aortic atherosclerosis.  She continues to be very physically active playing pickleball, and denies any dyspnea on exertion, chest discomfort, chest tightness, or chest pain with these more strenuous activities.  Most recent LDL checked last month 65 primary prevention well-controlled on statin.  Has not required any sublingual nitroglycerin, and tolerating aspirin well.    10/27/2022   She is a retired RN.  She reports a prior diagnosis of breast cancer initially 20 then 10 years ago, denies any history of chemotherapy, she did have radiation but only on the right side.  Family history of coronary disease in her father having an MI at 68, brother with coronary disease at 65.  She recently had COVID December of 2021, reports with antibody infusion therapy around that time she had systolic blood pressures in the 190s which was new for her previously blood pressure  had not been an issue she was prescribed hydralazine p.r.n. which she did not take but did follow-up with cardiologist to prescribed losartan 50 b.i.d..  Subsequent testing included echocardiogram and coronary calcium score, echo was grossly normal coronary calcium score did show elevated calcium.  She exercises on a regular basis and denies any significant chest pain or change in exercise tolerance, no tobacco use.  Does not add salt, does eat out intermittently.  With losartan 50 b.i.d. she has been checking blood pressures at home and they are running anywhere from 130-160 systolic.  Recently prescribed amlodipine by endocrinologist but has not started taking.  She did start taking Crestor 5 mg after coronary artery calcium score is elevated LDL has come down 57.  Her  denies that she snores.  She does report fatigue.    Dx:  Coronary artery disease by calcification   Aortic atherosclerosis   Family history of atherosclerosis   Hypertension   Hyperlipidemia   Breast cancer     Medications:  Outpatient Encounter Medications as of 4/24/2024   Medication Sig Dispense Refill    alendronate (FOSAMAX) 70 MG tablet Take one tablet every 7 days. 12 tablet 3    amino acids-minerals Tab Take 2 tablets by mouth 3 (three) times daily.      ascorbic acid, vitamin C, (VITAMIN C) 100 MG tablet   Daily, 0 Refill(s), Maintenance      betamethasone dipropionate (DIPROLENE) 0.05 % lotion Apply topically 2 (two) times daily. 60 mL 2    coenzyme Q10-vitamin E 100-100 mg-unit Cap Take by mouth. 1 Capsule Oral Every day      fish oil-dha-epa 1,200-144-216 mg Cap Take by mouth. 1 Capsule Oral Every day      glucosamine-chondroitin 500-400 mg tablet Take 1 tablet by mouth 3 (three) times daily.      hydrOXYzine HCL (ATARAX) 10 MG Tab Take 1 tablet (10 mg total) by mouth 3 (three) times daily as needed. 60 tablet 1    ketoconazole (NIZORAL) 2 % shampoo Apply topically twice a week. 120 mL 2    minoxidiL (LONITEN) 2.5 MG tablet Take  "1/2 tablet PO QHS 45 tablet 3    multivitamin (THERAGRAN) per tablet Take by mouth. 1 Tablet Oral Every day      nitroGLYCERIN (NITROSTAT) 0.4 MG SL tablet Place 1 tablet (0.4 mg total) under the tongue every 5 (five) minutes as needed for Chest pain. 90 tablet 2    rosuvastatin (CRESTOR) 5 MG tablet Take 1 tablet (5 mg total) by mouth once daily. 90 tablet 3    valsartan-hydrochlorothiazide (DIOVAN-HCT) 160-12.5 mg per tablet Take 1 tablet by mouth once daily. 90 tablet 3    vitamin D3-vitamin K2 1,250-200 mcg Cap   0 Refill(s)      aspirin (ECOTRIN) 81 MG EC tablet Take 1 tablet (81 mg total) by mouth once daily.  0    [] traMADoL (ULTRAM) 50 mg tablet Take 1 tablet (50 mg total) by mouth every 6 (six) hours as needed (pain not relieved by over-the-counter medications). 18 tablet 0     No facility-administered encounter medications on file as of 2024.     Social History:  Eva reports that she has never smoked. She has never used smokeless tobacco. She reports current alcohol use of about 1.0 standard drink of alcohol per week. She reports that she does not use drugs.  She is a retired RN    Objective:   /70   Pulse 67   Ht 5' 3" (1.6 m)   Wt 62.9 kg (138 lb 10.7 oz)   LMP  (LMP Unknown)   SpO2 99%   BMI 24.56 kg/m²     Physical Exam   HENT:   Head: Normocephalic and atraumatic.   Mouth/Throat: Mucous membranes are moist.   Cardiovascular: Normal rate, regular rhythm and normal pulses. Exam reveals no gallop and no friction rub.   No murmur heard.  Pulmonary/Chest: Effort normal and breath sounds normal. No stridor. No respiratory distress. She has no rhonchi. She has no rales. She exhibits no tenderness.   Abdominal: Normal appearance. She exhibits no distension.   Musculoskeletal:      Right lower leg: No edema.      Left lower leg: No edema.   Neurological: She is alert.   Skin: Skin is warm. Capillary refill takes less than 2 seconds.        EKG:  My independent visualization of " most recent EKG is normal sinus rhythm, nonspecific ST changes    TTE:  01/11/2022   The left ventricle is normal in size with concentric hypertrophy and normal systolic function.  The estimated ejection fraction is 62%.  Normal left ventricular diastolic function.  The left ventricular global longitudinal strain is -17%. Borderline.  Normal right ventricular size with normal right ventricular systolic function.  Normal central venous pressure (3 mmHg).  The estimated PA systolic pressure is 19 mmHg.  Mild left atrial enlargement.     Lipids:  Recent Labs   Lab 03/20/24  0923   LDL Cholesterol 65.8   HDL 60      Renal:  Recent Labs   Lab 04/06/24  1327   Creatinine 0.8   Potassium 3.7   CO2 27   BUN 19     Liver:  Recent Labs   Lab 04/06/24  1327   AST 49 H   ALT 37     CTA  04/06/2024     Atherosclerotic disease is seen within the abdominal aorta.  There is no evidence of abdominal aortic dissection.  There is no evidence of abdominal aortic aneurysm.  There is no evidence of abdominal nor pelvic lymphadenopathy.     The liver, spleen, adrenal glands, and pancreas show normal contrasted appearance.  There is cholelithiasis.     There is no evidence bowel obstruction.  Stool is seen throughout the colon.  There is no evidence abdominal free fluid or free air.  The osseous structures are unremarkable.     There is no evidence pneumothorax, pleural effusion or focal consolidation.  The the thyroid is unremarkable.     Impression:     There is no evidence aortic dissection.     Assessment:     1. Agatston coronary artery calcium score between 100 and 199, 155 in 2022    2. Aortic atherosclerosis    3. Cardiovascular event risk, ASCVD 10-years risk 16.5%, 2020    4. Essential hypertension    5. LVH (left ventricular hypertrophy) due to hypertensive disease, without heart failure    6. Nonspecific abnormal electrocardiogram (ECG) (EKG)      Plan:   1. Agatston coronary artery calcium score between 100 and 199, 155 in  2022/coronary artery disease  Initially some concern based on symptoms that this could have been coronary, but workup was unremarkable, and she has since performed moderately strenuous activities without any significant difficulty, no similar symptoms, thus suspect that this could have been GI in nature.  Discussed other potential GI pathologies including esophageal stricture, GERD, and esophageal spasm.  From a coronary standpoint she is doing very well risk factors well-controlled 120/70 blood pressure, exercising on a regular basis, eating healthy, and most recent LDL 65 less than 70 primary prevention.  Continue all of her current therapy without change.    Does have some mild AST elevation, stable, no changes indicated at this time.    2. Aortic atherosclerosis  No reports of claudication.    3. Cardiovascular event risk, ASCVD 10-years risk 16.5%, 2020  As above    4. Essential hypertension  Blood pressure well controlled continue current meds.    5. LVH (left ventricular hypertrophy) due to hypertensive disease, without heart failure      6. Nonspecific abnormal electrocardiogram (ECG) (EKG)      Follow-up in 6-12 months      Vinny Gomez MD [facility]

## 2024-05-11 ENCOUNTER — PATIENT MESSAGE (OUTPATIENT)
Dept: DERMATOLOGY | Facility: CLINIC | Age: 74
End: 2024-05-11
Payer: MEDICARE

## 2024-05-13 DIAGNOSIS — L65.8 FEMALE PATTERN ALOPECIA: ICD-10-CM

## 2024-05-13 RX ORDER — MINOXIDIL 2.5 MG/1
TABLET ORAL
Qty: 45 TABLET | Refills: 0 | Status: SHIPPED | OUTPATIENT
Start: 2024-05-13 | End: 2024-06-03 | Stop reason: SDUPTHER

## 2024-05-13 NOTE — TELEPHONE ENCOUNTER
Pt last seen 5/2023 - has an appt scheduled in June - requesting one month to get to appt.    PAST MEDICAL HISTORY:  Polysubstance abuse

## 2024-05-14 ENCOUNTER — OFFICE VISIT (OUTPATIENT)
Dept: FAMILY MEDICINE | Facility: CLINIC | Age: 74
End: 2024-05-14
Payer: MEDICARE

## 2024-05-14 VITALS
WEIGHT: 138 LBS | SYSTOLIC BLOOD PRESSURE: 122 MMHG | DIASTOLIC BLOOD PRESSURE: 74 MMHG | HEART RATE: 56 BPM | HEIGHT: 63 IN | OXYGEN SATURATION: 98 % | BODY MASS INDEX: 24.45 KG/M2

## 2024-05-14 DIAGNOSIS — R10.13 EPIGASTRIC PAIN: Primary | ICD-10-CM

## 2024-05-14 PROCEDURE — 1126F AMNT PAIN NOTED NONE PRSNT: CPT | Mod: CPTII,S$GLB,, | Performed by: STUDENT IN AN ORGANIZED HEALTH CARE EDUCATION/TRAINING PROGRAM

## 2024-05-14 PROCEDURE — 99214 OFFICE O/P EST MOD 30 MIN: CPT | Mod: S$GLB,,, | Performed by: STUDENT IN AN ORGANIZED HEALTH CARE EDUCATION/TRAINING PROGRAM

## 2024-05-14 PROCEDURE — 3061F NEG MICROALBUMINURIA REV: CPT | Mod: CPTII,S$GLB,, | Performed by: STUDENT IN AN ORGANIZED HEALTH CARE EDUCATION/TRAINING PROGRAM

## 2024-05-14 PROCEDURE — 1101F PT FALLS ASSESS-DOCD LE1/YR: CPT | Mod: CPTII,S$GLB,, | Performed by: STUDENT IN AN ORGANIZED HEALTH CARE EDUCATION/TRAINING PROGRAM

## 2024-05-14 PROCEDURE — 3288F FALL RISK ASSESSMENT DOCD: CPT | Mod: CPTII,S$GLB,, | Performed by: STUDENT IN AN ORGANIZED HEALTH CARE EDUCATION/TRAINING PROGRAM

## 2024-05-14 PROCEDURE — 3078F DIAST BP <80 MM HG: CPT | Mod: CPTII,S$GLB,, | Performed by: STUDENT IN AN ORGANIZED HEALTH CARE EDUCATION/TRAINING PROGRAM

## 2024-05-14 PROCEDURE — 3074F SYST BP LT 130 MM HG: CPT | Mod: CPTII,S$GLB,, | Performed by: STUDENT IN AN ORGANIZED HEALTH CARE EDUCATION/TRAINING PROGRAM

## 2024-05-14 PROCEDURE — 3066F NEPHROPATHY DOC TX: CPT | Mod: CPTII,S$GLB,, | Performed by: STUDENT IN AN ORGANIZED HEALTH CARE EDUCATION/TRAINING PROGRAM

## 2024-05-14 PROCEDURE — 3044F HG A1C LEVEL LT 7.0%: CPT | Mod: CPTII,S$GLB,, | Performed by: STUDENT IN AN ORGANIZED HEALTH CARE EDUCATION/TRAINING PROGRAM

## 2024-05-14 PROCEDURE — 1159F MED LIST DOCD IN RCRD: CPT | Mod: CPTII,S$GLB,, | Performed by: STUDENT IN AN ORGANIZED HEALTH CARE EDUCATION/TRAINING PROGRAM

## 2024-05-14 PROCEDURE — 99999 PR PBB SHADOW E&M-EST. PATIENT-LVL IV: CPT | Mod: PBBFAC,,, | Performed by: STUDENT IN AN ORGANIZED HEALTH CARE EDUCATION/TRAINING PROGRAM

## 2024-05-14 NOTE — PROGRESS NOTES
[facility] Note    Subjective:    The HPI and pertinent ROS is included in the Diagnostic Impression Remarks section at the end of the note. Please see below for further details. Chief complaint is at end of note.     Tiana is a pleasant intelligent patient who is here for evaluation.     Modified Medications    No medications on file       Data reviewed 274}  Previous medical records reviewed and summarized in plan section at end of note.      If you are due for any health screening(s) below please notify me so we can arrange them to be ordered and scheduled. Most healthy patients at your age complete them, but you are free to accept or refuse. If you can't do it, I'll definitely understand. If you can, I'd certainly appreciate it!     All of your core healthy metrics are met.      The following portions of the patient's history were reviewed and updated as appropriate: allergies, current medications, past family history, past medical history, past social history, past surgical history and problem list.    She  has a past medical history of Breast cancer (2000), Depression, Essential hypertension (5/18/2022), Hypertriglyceridemia (7/21/2019), Nephrolithiasis (2/5/2015), and Osteopenia.  She  has a past surgical history that includes Breast lumpectomy (2000); Hysterectomy (1998); Bilateral oophorectomy; Femoral hernia (2010); Mastectomy (2013 ); Tonsillectomy; Adenoidectomy; Hemorrhoid surgery; Breast reconstruction (Bilateral, 2013); and Oophorectomy.    She  reports that she has never smoked. She has never used smokeless tobacco. She reports current alcohol use of about 1.0 standard drink of alcohol per week. She reports that she does not use drugs.  She family history includes Breast cancer in her maternal grandmother and mother; Cancer in her maternal aunt and maternal grandfather; Heart disease (age of onset: 56) in her father; Hyperlipidemia in her son; Hypertension in her father; No Known  "Problems in her brother, daughter, daughter, sister, and sister; Ovarian cancer in her maternal aunt; Parkinsonism in her paternal grandfather; Skin cancer in her brother; Stroke in her paternal grandmother.    Review of patient's allergies indicates:   Allergen Reactions    Sulfa (sulfonamide antibiotics) Other (See Comments)     Other reaction(s): Swelling       Tobacco Use: Low Risk  (5/14/2024)    Patient History     Smoking Tobacco Use: Never     Smokeless Tobacco Use: Never     Passive Exposure: Not on file     Physical Examination  General appearance: alert, cooperative, no distress  Neck: no thyromegaly, no neck stiffness  Lungs: clear to auscultation, no wheezes, rales or rhonchi, symmetric air entry  Heart: normal rate, regular rhythm, normal S1, S2, no murmurs, rubs, clicks or gallops  Abdomen: soft, nontender, nondistended, no rigidity, rebound, or guarding.   Back: no point tenderness over spine  Extremities: peripheral pulses normal, no unilateral leg swelling or calf tenderness   Neurological:alert, oriented, normal speech, no new focal findings or movement disorder noted from baseline    BP Readings from Last 3 Encounters:   05/14/24 122/74   04/24/24 120/70   04/06/24 130/68     Wt Readings from Last 3 Encounters:   05/14/24 62.6 kg (138 lb)   04/24/24 62.9 kg (138 lb 10.7 oz)   04/06/24 61.7 kg (136 lb)     /74 (BP Location: Left arm, Patient Position: Sitting, BP Method: Medium (Manual))   Pulse (!) 56   Ht 5' 3" (1.6 m)   Wt 62.6 kg (138 lb)   LMP  (LMP Unknown)   SpO2 98%   BMI 24.45 kg/m²    274}  Laboratory: I have reviewed old labs below:    274}    Lab Results   Component Value Date    WBC 13.08 (H) 04/06/2024    HGB 13.5 04/06/2024    HCT 40.7 04/06/2024    MCV 87 04/06/2024     04/06/2024     04/06/2024    K 3.7 04/06/2024     04/06/2024    CALCIUM 10.0 04/06/2024    PHOS 2.6 (L) 10/11/2022    CO2 27 04/06/2024     (H) 04/06/2024    BUN 19 04/06/2024 " "   CREATININE 0.8 04/06/2024    EGFRNORACEVR >60.0 04/06/2024    ANIONGAP 11 04/06/2024    PROT 6.7 04/06/2024    ALBUMIN 4.0 04/06/2024    BILITOT 0.9 04/06/2024    ALKPHOS 65 04/06/2024    ALT 37 04/06/2024    AST 49 (H) 04/06/2024    INR 1.1 04/06/2024    CHOL 144 03/20/2024    TRIG 91 03/20/2024    HDL 60 03/20/2024    LDLCALC 65.8 03/20/2024    TSH 1.326 03/20/2024    HGBA1C 5.2 03/20/2024      Lab reviewed by me: Particular labs of significance that I will monitor, workup, or treat to improve are mentioned below in diagnostic impression remarks.    Imaging/EKG: I have reviewed the pertinent results and my findings are noted in remarks.  274}    CC:   Chief Complaint   Patient presents with    Follow-up     Gall stone pain, since October, had an attack on Saturday         274}    Assessment/Plan  Eva Joseph is a 74 y.o. female who presents to clinic with:  1. Epigastric pain       274}  Diagnostic Impression Remarks + HPI     Documentation entered by me for this encounter may have been done in part using speech-recognition technology. Although I have made an effort to ensure accuracy, "sound like" errors may exist and should be interpreted in context.      Patient presents d/t gallbladder pain. Patient has US that showed cholelithiasis in the past and was evaluated by general surgeon. Patient did not have cholecystectomy at the time after the event. Patient had another event last month after greasy foods and presented to the ed. Patient was recommend a hida scan. Will leave the hida scan to the general surgeon. Advised patient to discuss with specialist about possible surgery    Additional workup planned: see labs ordered below.    See below for labs and meds ordered with associated diagnosis      1. Epigastric pain      Devonte Ricardo MD   274}    If you are due for any health screening(s) below please notify me so we can arrange them to be ordered and scheduled. Most healthy patients at your age complete " them, but you are free to accept or refuse.     If you can't do it, I'll definitely understand. If you can, I'd certainly appreciate it!   All of your core healthy metrics are met.

## 2024-05-22 ENCOUNTER — OFFICE VISIT (OUTPATIENT)
Dept: SURGERY | Facility: CLINIC | Age: 74
End: 2024-05-22
Payer: MEDICARE

## 2024-05-22 VITALS — SYSTOLIC BLOOD PRESSURE: 101 MMHG | TEMPERATURE: 98 F | DIASTOLIC BLOOD PRESSURE: 65 MMHG | HEART RATE: 57 BPM

## 2024-05-22 DIAGNOSIS — K80.20 CALCULUS OF GALLBLADDER WITHOUT CHOLECYSTITIS WITHOUT OBSTRUCTION: ICD-10-CM

## 2024-05-22 DIAGNOSIS — K80.20 SYMPTOMATIC CHOLELITHIASIS: Primary | ICD-10-CM

## 2024-05-22 PROCEDURE — 3066F NEPHROPATHY DOC TX: CPT | Mod: CPTII,S$GLB,, | Performed by: SURGERY

## 2024-05-22 PROCEDURE — 99213 OFFICE O/P EST LOW 20 MIN: CPT | Mod: S$GLB,,, | Performed by: SURGERY

## 2024-05-22 PROCEDURE — 99999 PR PBB SHADOW E&M-EST. PATIENT-LVL III: CPT | Mod: PBBFAC,,, | Performed by: SURGERY

## 2024-05-22 PROCEDURE — 3044F HG A1C LEVEL LT 7.0%: CPT | Mod: CPTII,S$GLB,, | Performed by: SURGERY

## 2024-05-22 PROCEDURE — 3061F NEG MICROALBUMINURIA REV: CPT | Mod: CPTII,S$GLB,, | Performed by: SURGERY

## 2024-05-22 PROCEDURE — 1125F AMNT PAIN NOTED PAIN PRSNT: CPT | Mod: CPTII,S$GLB,, | Performed by: SURGERY

## 2024-05-22 PROCEDURE — 3078F DIAST BP <80 MM HG: CPT | Mod: CPTII,S$GLB,, | Performed by: SURGERY

## 2024-05-22 PROCEDURE — 3074F SYST BP LT 130 MM HG: CPT | Mod: CPTII,S$GLB,, | Performed by: SURGERY

## 2024-05-22 NOTE — H&P
GENERAL SURGERY  OUTPATIENT H&P    REASON FOR VISIT/CC: Symptomatic cholelithiasis    HPI: Eva Joseph is a 74 y.o. female known to me for evaluation for symptomatic cholelithiasis a few months back. Patient deferred surgical intervention at that time as she had a planned trip which she was since completed. She has also had 2 additional episodes of rather severe epigastric abdominal pain, 1 leading to the emergency room where she underwent workup for aortic dissection and other cardiac issues which were negative. Symptoms are attributable to symptomatic cholelithiasis.  No evidence of biliary obstruction however on review of CTA during workup there appeared to be significant inflammation and fluid around the gallbladder concerning for acute cholecystitis at that time. Patient currently is doing better but it was concerned about recurrent episodes in his interested in surgical intervention.    I have reviewed the patient's chart including prior progress notes, procedures and testing.     ROS:   Review of Systems    PROBLEM LIST:  Patient Active Problem List   Diagnosis    Osteopenia    Alopecia    Thyroid nodule    Primary hyperparathyroidism    History of breast cancer    History of ductal carcinoma in situ (DCIS) of breast    Aortic atherosclerosis    Thyroid disease    Thyroiditis    Abnormal thyroid blood test    Postmenopausal    Hypertriglyceridemia    Hypovitaminosis D    Contusion of right index finger without damage to nail    COVID-19, breakthrough 12/25/2021    Cardiovascular event risk, ASCVD 10-years risk 16.5%, 2020    Nonspecific abnormal electrocardiogram (ECG) (EKG)    Family history of premature CAD    Elevated blood pressure reading    Agatston coronary artery calcium score between 100 and 199, 155 in 2022    LVH (left ventricular hypertrophy) due to hypertensive disease, without heart failure    Essential hypertension         HISTORY  Past Medical History:   Diagnosis Date    Breast cancer  2000    also in 2013    Depression     Essential hypertension 5/18/2022    Hypertriglyceridemia 7/21/2019    Nephrolithiasis 2/5/2015    Osteopenia        Past Surgical History:   Procedure Laterality Date    ADENOIDECTOMY      BILATERAL OOPHORECTOMY      benign    BREAST LUMPECTOMY  2000    Right breast    BREAST RECONSTRUCTION Bilateral 2013    Femoral hernia  2010    right side repair    HEMORRHOID SURGERY      HYSTERECTOMY  1998    KELSEY, fibroid    MASTECTOMY  2013     bilateral     OOPHORECTOMY      TONSILLECTOMY         Social History     Tobacco Use    Smoking status: Never    Smokeless tobacco: Never   Substance Use Topics    Alcohol use: Yes     Alcohol/week: 1.0 standard drink of alcohol     Types: 1 Glasses of wine per week     Comment: Occasionally    Drug use: No       Family History   Problem Relation Name Age of Onset    Breast cancer Mother          Breast cancer-75yrs    Hypertension Father      Heart disease Father  56        MI    Cancer Maternal Aunt          ovarian    Ovarian cancer Maternal Aunt      Cancer Maternal Grandfather          testicular / prostate    Stroke Paternal Grandmother      Breast cancer Maternal Grandmother          Breast cancer-42yrs    Parkinsonism Paternal Grandfather      No Known Problems Sister      Skin cancer Brother      Hyperlipidemia Son          As a child    No Known Problems Daughter      No Known Problems Daughter      No Known Problems Sister      No Known Problems Brother      Amblyopia Neg Hx      Blindness Neg Hx      Cataracts Neg Hx      Diabetes Neg Hx      Glaucoma Neg Hx      Macular degeneration Neg Hx      Retinal detachment Neg Hx      Strabismus Neg Hx      Thyroid disease Neg Hx      Colon cancer Neg Hx      Melanoma Neg Hx           MEDS:  Current Outpatient Medications on File Prior to Visit   Medication Sig Dispense Refill    alendronate (FOSAMAX) 70 MG tablet Take one tablet every 7 days. 12 tablet 3    amino acids-minerals Tab Take 2  tablets by mouth 3 (three) times daily.      ascorbic acid, vitamin C, (VITAMIN C) 100 MG tablet   Daily, 0 Refill(s), Maintenance      aspirin (ECOTRIN) 81 MG EC tablet Take 1 tablet (81 mg total) by mouth once daily.  0    betamethasone dipropionate (DIPROLENE) 0.05 % lotion Apply topically 2 (two) times daily. 60 mL 2    coenzyme Q10-vitamin E 100-100 mg-unit Cap Take by mouth. 1 Capsule Oral Every day      fish oil-dha-epa 1,200-144-216 mg Cap Take by mouth. 1 Capsule Oral Every day      glucosamine-chondroitin 500-400 mg tablet Take 1 tablet by mouth 3 (three) times daily.      hydrOXYzine HCL (ATARAX) 10 MG Tab Take 1 tablet (10 mg total) by mouth 3 (three) times daily as needed. 60 tablet 1    ketoconazole (NIZORAL) 2 % shampoo Apply topically twice a week. 120 mL 2    minoxidiL (LONITEN) 2.5 MG tablet Take 1/2 tablet PO QHS 45 tablet 0    multivitamin (THERAGRAN) per tablet Take by mouth. 1 Tablet Oral Every day      nitroGLYCERIN (NITROSTAT) 0.4 MG SL tablet Place 1 tablet (0.4 mg total) under the tongue every 5 (five) minutes as needed for Chest pain. 90 tablet 2    rosuvastatin (CRESTOR) 5 MG tablet Take 1 tablet (5 mg total) by mouth once daily. 90 tablet 3    valsartan-hydrochlorothiazide (DIOVAN-HCT) 160-12.5 mg per tablet Take 1 tablet by mouth once daily. 90 tablet 3    vitamin D3-vitamin K2 1,250-200 mcg Cap   0 Refill(s)       No current facility-administered medications on file prior to visit.       ALLERGIES:  Review of patient's allergies indicates:   Allergen Reactions    Sulfa (sulfonamide antibiotics) Other (See Comments)     Other reaction(s): Swelling         VITALS:  There were no vitals filed for this visit.      PHYSICAL EXAM:  Physical Exam  Vitals reviewed.   Constitutional:       General: She is not in acute distress.     Appearance: Normal appearance. She is well-developed.   HENT:      Head: Normocephalic and atraumatic.      Nose: Nose normal.   Eyes:      General: No scleral  icterus.     Conjunctiva/sclera: Conjunctivae normal.   Neck:      Trachea: No tracheal tenderness or tracheal deviation.   Cardiovascular:      Rate and Rhythm: Normal rate and regular rhythm.      Pulses: Normal pulses.   Pulmonary:      Effort: Pulmonary effort is normal. No accessory muscle usage or respiratory distress.      Breath sounds: Normal breath sounds.   Abdominal:      General: There is no distension.      Palpations: Abdomen is soft.      Tenderness: There is no abdominal tenderness.   Musculoskeletal:         General: No swelling or tenderness. Normal range of motion.      Cervical back: Normal range of motion and neck supple. No rigidity.   Skin:     General: Skin is warm and dry.      Coloration: Skin is not jaundiced.      Findings: No erythema.   Neurological:      General: No focal deficit present.      Mental Status: She is alert and oriented to person, place, and time.      Motor: No weakness or abnormal muscle tone.   Psychiatric:         Mood and Affect: Mood normal.         Behavior: Behavior normal.         Thought Content: Thought content normal.         Judgment: Judgment normal.           LABS:  Lab Results   Component Value Date    WBC 13.08 (H) 04/06/2024    RBC 4.67 04/06/2024    HGB 13.5 04/06/2024    HCT 40.7 04/06/2024     04/06/2024     Lab Results   Component Value Date     (H) 04/06/2024     04/06/2024    K 3.7 04/06/2024     04/06/2024    CO2 27 04/06/2024    BUN 19 04/06/2024    CREATININE 0.8 04/06/2024    CALCIUM 10.0 04/06/2024     Lab Results   Component Value Date    ALT 37 04/06/2024    AST 49 (H) 04/06/2024    ALKPHOS 65 04/06/2024    BILITOT 0.9 04/06/2024     Lab Results   Component Value Date    MG 2.2 01/22/2020    PHOS 2.6 (L) 10/11/2022       STUDIES:  Images and reports were personally reviewed.  CTA 4/6/24  Personal review of CTA she was cholelithiasis and gallbladder wall thickening versus fluid concerning for acute cholecystitis, no  obvious other abnormality noted        ASSESSMENT & PLAN:  74 y.o. female with symptomatic cholelithiasis  -known history of gallstones and symptomatic cholelithiasis now with recurrent severe episodes  -rediscuss with the patient options for cholecystectomy  - risks and benefits of cholecystectomy were reviewed.  Specifically we discussed the risk of pain, bleeding, scarring, infection, bile leak, injury to common bile duct, injury to surrounding organs, retained stone, pancreatitis, bile leak, failure to relieve symptoms, etc.  we also discussed the need for potential conversion to open procedure or need to perform intraoperative cholangiogram.  The patient expressed understanding of these risks and agreed proceed with surgical intervention.  -will schedule for surgery on 06/13/2024  -will need updated labs

## 2024-06-03 ENCOUNTER — OFFICE VISIT (OUTPATIENT)
Dept: DERMATOLOGY | Facility: CLINIC | Age: 74
End: 2024-06-03
Payer: MEDICARE

## 2024-06-03 VITALS — BODY MASS INDEX: 24.45 KG/M2 | WEIGHT: 138 LBS | HEIGHT: 63 IN

## 2024-06-03 DIAGNOSIS — L65.8 FEMALE PATTERN ALOPECIA: ICD-10-CM

## 2024-06-03 DIAGNOSIS — L73.8 SEBACEOUS HYPERPLASIA OF FACE: ICD-10-CM

## 2024-06-03 DIAGNOSIS — D22.9 MULTIPLE BENIGN NEVI: ICD-10-CM

## 2024-06-03 DIAGNOSIS — L65.0 TELOGEN EFFLUVIUM: Primary | ICD-10-CM

## 2024-06-03 DIAGNOSIS — L82.1 SEBORRHEIC KERATOSES: ICD-10-CM

## 2024-06-03 DIAGNOSIS — Z12.83 SKIN CANCER SCREENING: ICD-10-CM

## 2024-06-03 DIAGNOSIS — L81.4 SOLAR LENTIGO: ICD-10-CM

## 2024-06-03 PROCEDURE — 3288F FALL RISK ASSESSMENT DOCD: CPT | Mod: CPTII,S$GLB,, | Performed by: DERMATOLOGY

## 2024-06-03 PROCEDURE — 3044F HG A1C LEVEL LT 7.0%: CPT | Mod: CPTII,S$GLB,, | Performed by: DERMATOLOGY

## 2024-06-03 PROCEDURE — 3066F NEPHROPATHY DOC TX: CPT | Mod: CPTII,S$GLB,, | Performed by: DERMATOLOGY

## 2024-06-03 PROCEDURE — 1159F MED LIST DOCD IN RCRD: CPT | Mod: CPTII,S$GLB,, | Performed by: DERMATOLOGY

## 2024-06-03 PROCEDURE — 1126F AMNT PAIN NOTED NONE PRSNT: CPT | Mod: CPTII,S$GLB,, | Performed by: DERMATOLOGY

## 2024-06-03 PROCEDURE — 1160F RVW MEDS BY RX/DR IN RCRD: CPT | Mod: CPTII,S$GLB,, | Performed by: DERMATOLOGY

## 2024-06-03 PROCEDURE — 99214 OFFICE O/P EST MOD 30 MIN: CPT | Mod: S$GLB,,, | Performed by: DERMATOLOGY

## 2024-06-03 PROCEDURE — 3061F NEG MICROALBUMINURIA REV: CPT | Mod: CPTII,S$GLB,, | Performed by: DERMATOLOGY

## 2024-06-03 PROCEDURE — 1101F PT FALLS ASSESS-DOCD LE1/YR: CPT | Mod: CPTII,S$GLB,, | Performed by: DERMATOLOGY

## 2024-06-03 RX ORDER — MINOXIDIL 2.5 MG/1
TABLET ORAL
Qty: 45 TABLET | Refills: 3 | Status: SHIPPED | OUTPATIENT
Start: 2024-06-03

## 2024-06-03 NOTE — PROGRESS NOTES
"  Subjective:      Patient ID:  Eva Joseph is a 74 y.o. female who presents for   Chief Complaint   Patient presents with    Skin Check     TBSE    Spot     face     LOV: 3/27/23 Common wart, SK, solar lentigo, sebaceous hyperplasia of face, nevi, female pattern alopecia    Patient here for TBSE    C/o rough patch on right upper arm  Does not itch, "I just feel it"    C/o spot on right bottom eye lid  C/o dark spot on right cheek    COVID 12/2023- stareted shedding severely 1 month ago, requests minoxidil oral low dose refill  Bilateral mastectomy 10 years ago    Derm Hx:  Denies Phx of NMSC  Denies Fhx of MM    Current Outpatient Medications:   ·  alendronate (FOSAMAX) 70 MG tablet, Take one tablet every 7 days., Disp: 12 tablet, Rfl: 3  ·  amino acids-minerals Tab, Take 2 tablets by mouth 3 (three) times daily., Disp: , Rfl:   ·  ascorbic acid, vitamin C, (VITAMIN C) 100 MG tablet,  Daily, 0 Refill(s), Maintenance, Disp: , Rfl:   ·  betamethasone dipropionate (DIPROLENE) 0.05 % lotion, Apply topically 2 (two) times daily., Disp: 60 mL, Rfl: 2  ·  coenzyme Q10-vitamin E 100-100 mg-unit Cap, Take by mouth. 1 Capsule Oral Every day, Disp: , Rfl:   ·  fish oil-dha-epa 1,200-144-216 mg Cap, Take by mouth. 1 Capsule Oral Every day, Disp: , Rfl:   ·  glucosamine-chondroitin 500-400 mg tablet, Take 1 tablet by mouth 3 (three) times daily., Disp: , Rfl:   ·  hydrOXYzine HCL (ATARAX) 10 MG Tab, Take 1 tablet (10 mg total) by mouth 3 (three) times daily as needed., Disp: 60 tablet, Rfl: 1  ·  ketoconazole (NIZORAL) 2 % shampoo, Apply topically twice a week., Disp: 120 mL, Rfl: 2  ·  minoxidiL (LONITEN) 2.5 MG tablet, Take 1/2 tablet PO QHS, Disp: 45 tablet, Rfl: 0  ·  multivitamin (THERAGRAN) per tablet, Take by mouth. 1 Tablet Oral Every day, Disp: , Rfl:   ·  nitroGLYCERIN (NITROSTAT) 0.4 MG SL tablet, Place 1 tablet (0.4 mg total) under the tongue every 5 (five) minutes as needed for Chest pain., Disp: 90 tablet, " Rfl: 2  ·  rosuvastatin (CRESTOR) 5 MG tablet, Take 1 tablet (5 mg total) by mouth once daily., Disp: 90 tablet, Rfl: 3  ·  valsartan-hydrochlorothiazide (DIOVAN-HCT) 160-12.5 mg per tablet, Take 1 tablet by mouth once daily., Disp: 90 tablet, Rfl: 3  ·  vitamin D3-vitamin K2 1,250-200 mcg Cap,  0 Refill(s), Disp: , Rfl:   ·  aspirin (ECOTRIN) 81 MG EC tablet, Take 1 tablet (81 mg total) by mouth once daily., Disp: , Rfl: 0                  Review of Systems   Constitutional:  Negative for fever, chills and fatigue. Weight gain: covid.  HENT:  Negative for headaches.    Respiratory:  Negative for cough and shortness of breath.    Skin:  Positive for daily sunscreen use, activity-related sunscreen use and wears hat. Negative for itching, rash and dry skin.   Neurological:  Negative for headaches.   Hematologic/Lymphatic: Bruises/bleeds easily.       Objective:   Physical Exam   Constitutional: She appears well-developed and well-nourished. No distress.   HENT:   Mouth/Throat: Lips normal.    Eyes: Lids are normal.    Neurological: She is alert and oriented to person, place, and time. She is not disoriented.   Psychiatric: She has a normal mood and affect. She is not agitated.   Skin:   Areas Examined (abnormalities noted in diagram):   Scalp / Hair Palpated and Inspected  Head / Face Inspection Performed  Neck Inspection Performed  Chest / Axilla Inspection Performed  Abdomen Inspection Performed  Genitals / Buttocks / Groin Inspection Performed  Back Inspection Performed  RUE Inspected  LUE Inspection Performed  RLE Inspected  LLE Inspection Performed  Nails and Digits Inspection Performed                     Diagram Legend     Erythematous scaling macule/papule c/w actinic keratosis       Vascular papule c/w angioma      Pigmented verrucoid papule/plaque c/w seborrheic keratosis      Yellow umbilicated papule c/w sebaceous hyperplasia      Irregularly shaped tan macule c/w lentigo     1-2 mm smooth white papules  consistent with Milia      Movable subcutaneous cyst with punctum c/w epidermal inclusion cyst      Subcutaneous movable cyst c/w pilar cyst      Firm pink to brown papule c/w dermatofibroma      Pedunculated fleshy papule(s) c/w skin tag(s)      Evenly pigmented macule c/w junctional nevus     Mildly variegated pigmented, slightly irregular-bordered macule c/w mildly atypical nevus      Flesh colored to evenly pigmented papule c/w intradermal nevus       Pink pearly papule/plaque c/w basal cell carcinoma      Erythematous hyperkeratotic cursted plaque c/w SCC      Surgical scar with no sign of skin cancer recurrence      Open and closed comedones      Inflammatory papules and pustules      Verrucoid papule consistent consistent with wart     Erythematous eczematous patches and plaques     Dystrophic onycholytic nail with subungual debris c/w onychomycosis     Umbilicated papule    Erythematous-base heme-crusted tan verrucoid plaque consistent with inflamed seborrheic keratosis     Erythematous Silvery Scaling Plaque c/w Psoriasis     See annotation      Assessment / Plan:        Telogen effluvium  -     minoxidiL (LONITEN) 2.5 MG tablet; Take 1/2 tablet PO QHS  Dispense: 45 tablet; Refill: 3  Well tolerated   TE post COVID, underlying mild AGA    Multiple benign nevi  Careful dermoscopy evaluation of nevi performed with none identified as needing biopsy today  Monitor for new mole or moles that are becoming bigger, darker, irritated, or developing irregular borders.     Seborrheic keratoses  These are benign inherited growths without a malignant potential. Reassurance given to patient. No treatment is necessary.     Sebaceous hyperplasia of face  This is a common condition representing benign enlargement of the sebaceous lobule. It typically occurs in adulthood. Reassurance given to patient.     Solar lentigo  This is a benign hyperpigmented sun induced lesion. Daily sun protection will reduce the number of new  lesions. Treatment of these benign lesions are considered cosmetic.    Skin cancer screening  Total body skin examination performed today including at least 12 points as noted in physical examination. No lesions suspicious for malignancy noted.    Patient instructed in importance in daily broad spectrum sun protection of at least spf 30. Mineral sunscreen ingredients preferred (Zinc +/- Titanium) and can be found OTC.   Recommend Elta MD for daily use on face and neck.  Patient encouraged to wear hat for all outdoor exposure.   Also discussed sun avoidance and use of protective clothing.           No follow-ups on file.

## 2024-06-04 ENCOUNTER — LAB VISIT (OUTPATIENT)
Dept: LAB | Facility: HOSPITAL | Age: 74
End: 2024-06-04
Attending: SURGERY
Payer: MEDICARE

## 2024-06-04 DIAGNOSIS — K80.20 SYMPTOMATIC CHOLELITHIASIS: ICD-10-CM

## 2024-06-04 LAB
ALBUMIN SERPL BCP-MCNC: 3.9 G/DL (ref 3.5–5.2)
ALP SERPL-CCNC: 85 U/L (ref 55–135)
ALT SERPL W/O P-5'-P-CCNC: 24 U/L (ref 10–44)
ANION GAP SERPL CALC-SCNC: 10 MMOL/L (ref 8–16)
AST SERPL-CCNC: 24 U/L (ref 10–40)
BASOPHILS # BLD AUTO: 0.08 K/UL (ref 0–0.2)
BASOPHILS NFR BLD: 1.3 % (ref 0–1.9)
BILIRUB SERPL-MCNC: 0.6 MG/DL (ref 0.1–1)
BUN SERPL-MCNC: 18 MG/DL (ref 8–23)
CALCIUM SERPL-MCNC: 9.7 MG/DL (ref 8.7–10.5)
CHLORIDE SERPL-SCNC: 107 MMOL/L (ref 95–110)
CO2 SERPL-SCNC: 25 MMOL/L (ref 23–29)
CREAT SERPL-MCNC: 0.8 MG/DL (ref 0.5–1.4)
DIFFERENTIAL METHOD BLD: NORMAL
EOSINOPHIL # BLD AUTO: 0.2 K/UL (ref 0–0.5)
EOSINOPHIL NFR BLD: 2.5 % (ref 0–8)
ERYTHROCYTE [DISTWIDTH] IN BLOOD BY AUTOMATED COUNT: 12.5 % (ref 11.5–14.5)
EST. GFR  (NO RACE VARIABLE): >60 ML/MIN/1.73 M^2
GLUCOSE SERPL-MCNC: 110 MG/DL (ref 70–110)
HCT VFR BLD AUTO: 43.4 % (ref 37–48.5)
HGB BLD-MCNC: 13.9 G/DL (ref 12–16)
IMM GRANULOCYTES # BLD AUTO: 0.01 K/UL (ref 0–0.04)
IMM GRANULOCYTES NFR BLD AUTO: 0.2 % (ref 0–0.5)
LYMPHOCYTES # BLD AUTO: 1.5 K/UL (ref 1–4.8)
LYMPHOCYTES NFR BLD: 24.9 % (ref 18–48)
MCH RBC QN AUTO: 28.4 PG (ref 27–31)
MCHC RBC AUTO-ENTMCNC: 32 G/DL (ref 32–36)
MCV RBC AUTO: 89 FL (ref 82–98)
MONOCYTES # BLD AUTO: 0.4 K/UL (ref 0.3–1)
MONOCYTES NFR BLD: 6 % (ref 4–15)
NEUTROPHILS # BLD AUTO: 3.9 K/UL (ref 1.8–7.7)
NEUTROPHILS NFR BLD: 65.1 % (ref 38–73)
NRBC BLD-RTO: 0 /100 WBC
PLATELET # BLD AUTO: 246 K/UL (ref 150–450)
PMV BLD AUTO: 9.8 FL (ref 9.2–12.9)
POTASSIUM SERPL-SCNC: 4.1 MMOL/L (ref 3.5–5.1)
PROT SERPL-MCNC: 6.8 G/DL (ref 6–8.4)
RBC # BLD AUTO: 4.89 M/UL (ref 4–5.4)
SODIUM SERPL-SCNC: 142 MMOL/L (ref 136–145)
WBC # BLD AUTO: 6.03 K/UL (ref 3.9–12.7)

## 2024-06-04 PROCEDURE — 85025 COMPLETE CBC W/AUTO DIFF WBC: CPT | Performed by: SURGERY

## 2024-06-04 PROCEDURE — 36415 COLL VENOUS BLD VENIPUNCTURE: CPT | Performed by: SURGERY

## 2024-06-04 PROCEDURE — 80053 COMPREHEN METABOLIC PANEL: CPT | Performed by: SURGERY

## 2024-06-13 ENCOUNTER — ANESTHESIA (OUTPATIENT)
Dept: SURGERY | Facility: HOSPITAL | Age: 74
End: 2024-06-13
Payer: MEDICARE

## 2024-06-13 ENCOUNTER — ANESTHESIA EVENT (OUTPATIENT)
Dept: SURGERY | Facility: HOSPITAL | Age: 74
End: 2024-06-13
Payer: MEDICARE

## 2024-06-13 ENCOUNTER — HOSPITAL ENCOUNTER (OUTPATIENT)
Facility: HOSPITAL | Age: 74
Discharge: HOME | End: 2024-06-13
Attending: SURGERY | Admitting: SURGERY
Payer: MEDICARE

## 2024-06-13 VITALS
DIASTOLIC BLOOD PRESSURE: 66 MMHG | HEIGHT: 63 IN | TEMPERATURE: 98 F | SYSTOLIC BLOOD PRESSURE: 135 MMHG | WEIGHT: 138 LBS | RESPIRATION RATE: 16 BRPM | BODY MASS INDEX: 24.45 KG/M2 | HEART RATE: 55 BPM | OXYGEN SATURATION: 98 %

## 2024-06-13 DIAGNOSIS — K80.20 SYMPTOMATIC CHOLELITHIASIS: ICD-10-CM

## 2024-06-13 PROCEDURE — 71000033 HC RECOVERY, INTIAL HOUR: Performed by: SURGERY

## 2024-06-13 PROCEDURE — 37000008 HC ANESTHESIA 1ST 15 MINUTES: Performed by: SURGERY

## 2024-06-13 PROCEDURE — 71000016 HC POSTOP RECOV ADDL HR: Performed by: SURGERY

## 2024-06-13 PROCEDURE — 47562 LAPAROSCOPIC CHOLECYSTECTOMY: CPT | Mod: ,,, | Performed by: SURGERY

## 2024-06-13 PROCEDURE — 36000709 HC OR TIME LEV III EA ADD 15 MIN: Performed by: SURGERY

## 2024-06-13 PROCEDURE — 88304 TISSUE EXAM BY PATHOLOGIST: CPT | Mod: TC | Performed by: PATHOLOGY

## 2024-06-13 PROCEDURE — 25000003 PHARM REV CODE 250: Performed by: SURGERY

## 2024-06-13 PROCEDURE — 63600175 PHARM REV CODE 636 W HCPCS: Performed by: SURGERY

## 2024-06-13 PROCEDURE — 71000015 HC POSTOP RECOV 1ST HR: Performed by: SURGERY

## 2024-06-13 PROCEDURE — C9290 INJ, BUPIVACAINE LIPOSOME: HCPCS | Performed by: SURGERY

## 2024-06-13 PROCEDURE — 25000003 PHARM REV CODE 250: Performed by: ANESTHESIOLOGY

## 2024-06-13 PROCEDURE — 63600175 PHARM REV CODE 636 W HCPCS: Performed by: ANESTHESIOLOGY

## 2024-06-13 PROCEDURE — 25000003 PHARM REV CODE 250: Performed by: NURSE ANESTHETIST, CERTIFIED REGISTERED

## 2024-06-13 PROCEDURE — 71000039 HC RECOVERY, EACH ADD'L HOUR: Performed by: SURGERY

## 2024-06-13 PROCEDURE — 63600175 PHARM REV CODE 636 W HCPCS: Performed by: NURSE ANESTHETIST, CERTIFIED REGISTERED

## 2024-06-13 PROCEDURE — 36000708 HC OR TIME LEV III 1ST 15 MIN: Performed by: SURGERY

## 2024-06-13 PROCEDURE — 27201423 OPTIME MED/SURG SUP & DEVICES STERILE SUPPLY: Performed by: SURGERY

## 2024-06-13 PROCEDURE — 37000009 HC ANESTHESIA EA ADD 15 MINS: Performed by: SURGERY

## 2024-06-13 RX ORDER — DIPHENHYDRAMINE HYDROCHLORIDE 50 MG/ML
25 INJECTION INTRAMUSCULAR; INTRAVENOUS EVERY 6 HOURS PRN
Status: DISCONTINUED | OUTPATIENT
Start: 2024-06-13 | End: 2024-06-13 | Stop reason: HOSPADM

## 2024-06-13 RX ORDER — LIDOCAINE HYDROCHLORIDE 20 MG/ML
INJECTION INTRAVENOUS
Status: DISCONTINUED | OUTPATIENT
Start: 2024-06-13 | End: 2024-06-13

## 2024-06-13 RX ORDER — ROCURONIUM BROMIDE 10 MG/ML
INJECTION, SOLUTION INTRAVENOUS
Status: DISCONTINUED | OUTPATIENT
Start: 2024-06-13 | End: 2024-06-13

## 2024-06-13 RX ORDER — FENTANYL CITRATE 50 UG/ML
INJECTION, SOLUTION INTRAMUSCULAR; INTRAVENOUS
Status: DISCONTINUED | OUTPATIENT
Start: 2024-06-13 | End: 2024-06-13

## 2024-06-13 RX ORDER — PROPOFOL 10 MG/ML
VIAL (ML) INTRAVENOUS
Status: DISCONTINUED | OUTPATIENT
Start: 2024-06-13 | End: 2024-06-13

## 2024-06-13 RX ORDER — DEXAMETHASONE SODIUM PHOSPHATE 4 MG/ML
INJECTION, SOLUTION INTRA-ARTICULAR; INTRALESIONAL; INTRAMUSCULAR; INTRAVENOUS; SOFT TISSUE
Status: DISCONTINUED | OUTPATIENT
Start: 2024-06-13 | End: 2024-06-13

## 2024-06-13 RX ORDER — ONDANSETRON HYDROCHLORIDE 2 MG/ML
4 INJECTION, SOLUTION INTRAVENOUS DAILY PRN
Status: DISCONTINUED | OUTPATIENT
Start: 2024-06-13 | End: 2024-06-13 | Stop reason: HOSPADM

## 2024-06-13 RX ORDER — EPHEDRINE SULFATE 50 MG/ML
INJECTION, SOLUTION INTRAVENOUS
Status: DISCONTINUED | OUTPATIENT
Start: 2024-06-13 | End: 2024-06-13

## 2024-06-13 RX ORDER — HYDROCODONE BITARTRATE AND ACETAMINOPHEN 5; 325 MG/1; MG/1
1 TABLET ORAL EVERY 6 HOURS PRN
Qty: 20 TABLET | Refills: 0 | Status: SHIPPED | OUTPATIENT
Start: 2024-06-13

## 2024-06-13 RX ORDER — OXYCODONE HYDROCHLORIDE 5 MG/1
5 TABLET ORAL
Status: COMPLETED | OUTPATIENT
Start: 2024-06-13 | End: 2024-06-13

## 2024-06-13 RX ORDER — HYDROMORPHONE HYDROCHLORIDE 1 MG/ML
0.2 INJECTION, SOLUTION INTRAMUSCULAR; INTRAVENOUS; SUBCUTANEOUS EVERY 5 MIN PRN
Status: DISCONTINUED | OUTPATIENT
Start: 2024-06-13 | End: 2024-06-13 | Stop reason: HOSPADM

## 2024-06-13 RX ORDER — BUPIVACAINE HYDROCHLORIDE AND EPINEPHRINE 2.5; 5 MG/ML; UG/ML
INJECTION, SOLUTION EPIDURAL; INFILTRATION; INTRACAUDAL; PERINEURAL
Status: DISCONTINUED | OUTPATIENT
Start: 2024-06-13 | End: 2024-06-13 | Stop reason: HOSPADM

## 2024-06-13 RX ORDER — ONDANSETRON HYDROCHLORIDE 2 MG/ML
INJECTION, SOLUTION INTRAVENOUS
Status: DISCONTINUED | OUTPATIENT
Start: 2024-06-13 | End: 2024-06-13

## 2024-06-13 RX ORDER — ACETAMINOPHEN 10 MG/ML
INJECTION, SOLUTION INTRAVENOUS
Status: DISCONTINUED | OUTPATIENT
Start: 2024-06-13 | End: 2024-06-13

## 2024-06-13 RX ORDER — MEPERIDINE HYDROCHLORIDE 50 MG/ML
12.5 INJECTION INTRAMUSCULAR; INTRAVENOUS; SUBCUTANEOUS EVERY 10 MIN PRN
Status: DISCONTINUED | OUTPATIENT
Start: 2024-06-13 | End: 2024-06-13 | Stop reason: HOSPADM

## 2024-06-13 RX ORDER — FAMOTIDINE 10 MG/ML
INJECTION INTRAVENOUS
Status: DISCONTINUED | OUTPATIENT
Start: 2024-06-13 | End: 2024-06-13

## 2024-06-13 RX ORDER — SCOLOPAMINE TRANSDERMAL SYSTEM 1 MG/1
1 PATCH, EXTENDED RELEASE TRANSDERMAL
Status: CANCELLED | OUTPATIENT
Start: 2024-06-13

## 2024-06-13 RX ORDER — SUCCINYLCHOLINE CHLORIDE 20 MG/ML
INJECTION INTRAMUSCULAR; INTRAVENOUS
Status: DISCONTINUED | OUTPATIENT
Start: 2024-06-13 | End: 2024-06-13

## 2024-06-13 RX ADMIN — FENTANYL CITRATE 100 MCG: 50 INJECTION, SOLUTION INTRAMUSCULAR; INTRAVENOUS at 09:06

## 2024-06-13 RX ADMIN — PROPOFOL 180 MG: 10 INJECTION, EMULSION INTRAVENOUS at 09:06

## 2024-06-13 RX ADMIN — SODIUM CHLORIDE, SODIUM LACTATE, POTASSIUM CHLORIDE, AND CALCIUM CHLORIDE: .6; .31; .03; .02 INJECTION, SOLUTION INTRAVENOUS at 09:06

## 2024-06-13 RX ADMIN — ONDANSETRON 4 MG: 2 INJECTION INTRAMUSCULAR; INTRAVENOUS at 09:06

## 2024-06-13 RX ADMIN — FAMOTIDINE 20 MG: 10 INJECTION, SOLUTION INTRAVENOUS at 09:06

## 2024-06-13 RX ADMIN — DEXAMETHASONE SODIUM PHOSPHATE 8 MG: 4 INJECTION, SOLUTION INTRAMUSCULAR; INTRAVENOUS at 09:06

## 2024-06-13 RX ADMIN — ACETAMINOPHEN 1000 MG: 10 INJECTION, SOLUTION INTRAVENOUS at 09:06

## 2024-06-13 RX ADMIN — HYDROMORPHONE HYDROCHLORIDE 0.2 MG: 1 INJECTION, SOLUTION INTRAMUSCULAR; INTRAVENOUS; SUBCUTANEOUS at 10:06

## 2024-06-13 RX ADMIN — ROCURONIUM BROMIDE 5 MG: 10 INJECTION, SOLUTION INTRAVENOUS at 09:06

## 2024-06-13 RX ADMIN — SUGAMMADEX 200 MG: 100 INJECTION, SOLUTION INTRAVENOUS at 10:06

## 2024-06-13 RX ADMIN — EPHEDRINE SULFATE 10 MG: 50 INJECTION INTRAVENOUS at 09:06

## 2024-06-13 RX ADMIN — OXYCODONE HYDROCHLORIDE 5 MG: 5 TABLET ORAL at 11:06

## 2024-06-13 RX ADMIN — Medication 160 MG: at 09:06

## 2024-06-13 RX ADMIN — ROCURONIUM BROMIDE 30 MG: 10 INJECTION, SOLUTION INTRAVENOUS at 09:06

## 2024-06-13 RX ADMIN — LIDOCAINE HYDROCHLORIDE 50 MG: 20 INJECTION, SOLUTION INTRAVENOUS at 09:06

## 2024-06-13 NOTE — DISCHARGE INSTRUCTIONS
For 24 hours:  Do not shower  Do not sign any legal documents  Do not drink alcohol  No driving    Do not immerse yourself in a bathtub or any other body of water for the next 2 weeks. Showers only, starting tomorrow.

## 2024-06-13 NOTE — DISCHARGE SUMMARY
[facility]  Discharge Note  Short Stay    Procedure(s) (LRB):  CHOLECYSTECTOMY, LAPAROSCOPIC (N/A)      OUTCOME: Patient tolerated treatment/procedure well without complication and is now ready for discharge.    DISPOSITION: Home or Self Care    FINAL DIAGNOSIS:  <principal problem not specified>    FOLLOWUP: In clinic    DISCHARGE INSTRUCTIONS:    Discharge Procedure Orders   Diet Adult Regular     Ice to affected area     Lifting restrictions   Order Comments: Please avoid lifting greater than 40 lb, straining, strenuous activity for four weeks.     Change dressing (specify)   Order Comments: Post-Operative Wound Care    A surgical glue has been placed over your incisions, please leave the glue in place and do not attempt to remove it.  It is ok to shower using mild soap and water over the incisions the day after your procedure. Pat dry your incisions. Do not soak in a bathtub or other body of water for 2 weeks or until cleared by your surgeon.     If you noticed redness, swelling, fever, increasing pain or significant drainage from your wound please call/message the office or the 24 hr nurse hotline after hours.     Notify your health care provider if you experience any of the following:  temperature >100.4     Notify your health care provider if you experience any of the following:  persistent nausea and vomiting or diarrhea     Notify your health care provider if you experience any of the following:  severe uncontrolled pain     Notify your health care provider if you experience any of the following:  redness, tenderness, or signs of infection (pain, swelling, redness, odor or green/yellow discharge around incision site)     Notify your health care provider if you experience any of the following:  worsening rash     Notify your health care provider if you experience any of the following:  increased confusion or weakness     Shower on day dressing removed (No bath)         Clinical Reference  Documents Added to Patient Instructions         Document    CHOLECYSTECTOMY DISCHARGE INSTRUCTIONS (ENGLISH)    LACERATION REPAIR WITH GLUE DISCHARGE INSTRUCTIONS (ENGLISH)            TIME SPENT ON DISCHARGE: 10 minutes

## 2024-06-13 NOTE — ANESTHESIA PREPROCEDURE EVALUATION
06/13/2024  Eva Joseph is a 74 y.o., female.      Patient Active Problem List   Diagnosis    Osteopenia    Alopecia    Thyroid nodule    Primary hyperparathyroidism    History of breast cancer    History of ductal carcinoma in situ (DCIS) of breast    Aortic atherosclerosis    Thyroid disease    Thyroiditis    Abnormal thyroid blood test    Postmenopausal    Hypertriglyceridemia    Hypovitaminosis D    Contusion of right index finger without damage to nail    COVID-19, breakthrough 12/25/2021    Cardiovascular event risk, ASCVD 10-years risk 16.5%, 2020    Nonspecific abnormal electrocardiogram (ECG) (EKG)    Family history of premature CAD    Elevated blood pressure reading    Agatston coronary artery calcium score between 100 and 199, 155 in 2022    LVH (left ventricular hypertrophy) due to hypertensive disease, without heart failure    Essential hypertension       Past Surgical History:   Procedure Laterality Date    ADENOIDECTOMY      BILATERAL OOPHORECTOMY      benign    BREAST LUMPECTOMY  2000    Right breast    BREAST RECONSTRUCTION Bilateral 2013    CATARACT EXTRACTION, BILATERAL      Femoral hernia  2010    right side repair    HEMORRHOID SURGERY      HYSTERECTOMY  1998    KELSEY, fibroid    MASTECTOMY  2013    bilateral     OOPHORECTOMY      TONSILLECTOMY          Tobacco Use:  The patient  reports that she has never smoked. She has never used smokeless tobacco.     Results for orders placed or performed during the hospital encounter of 04/06/24   EKG 12-lead    Collection Time: 04/06/24  1:09 PM   Result Value Ref Range    QRS Duration 102 ms    OHS QTC Calculation 399 ms    Narrative    Test Reason : R07.9,    Vent. Rate : 049 BPM     Atrial Rate : 049 BPM     P-R Int : 166 ms          QRS Dur : 102 ms      QT Int : 442 ms       P-R-T Axes : 048 066 071 degrees     QTc Int : 399 ms    Sinus  bradycardia  Otherwise normal ECG  When compared with ECG of 03-JAN-2022 15:25,  No significant change was found  Confirmed by Robbie Whittington MD ([id number]) on 4/8/2024 10:51:44 AM    Referred By: [email or web address]   SELF           Confirmed By:Robbie Whittington MD             Lab Results   Component Value Date    WBC 6.03 06/04/2024    HGB 13.9 06/04/2024    HCT 43.4 06/04/2024    MCV 89 06/04/2024     06/04/2024     BMP  Lab Results   Component Value Date     06/04/2024    K 4.1 06/04/2024     06/04/2024    CO2 25 06/04/2024    BUN 18 06/04/2024    CREATININE 0.8 06/04/2024    CALCIUM 9.7 06/04/2024    ANIONGAP 10 06/04/2024     06/04/2024     (H) 04/06/2024    GLU 87 03/20/2024       Results for orders placed in visit on 01/03/22    Echo    Interpretation Summary  · The left ventricle is normal in size with concentric hypertrophy and normal systolic function.  · The estimated ejection fraction is 62%.  · Normal left ventricular diastolic function.  · The left ventricular global longitudinal strain is -17%. Borderline.  · Normal right ventricular size with normal right ventricular systolic function.  · Normal central venous pressure (3 mmHg).  · The estimated PA systolic pressure is 19 mmHg.  · Mild left atrial enlargement.         Pre-op Assessment    I have reviewed the Patient Summary Reports.     I have reviewed the Nursing Notes. I have reviewed the NPO Status.   I have reviewed the Medications.     Review of Systems  Anesthesia Hx:  No problems with previous Anesthesia   Neg history of prior surgery.          Denies Family Hx of Anesthesia complications.    Denies Personal Hx of Anesthesia complications.                    Social:  Alcohol Use, Non-Smoker       Hematology/Oncology:  Hematology Normal                       --  Cancer in past history:       Breast              EENT/Dental:  EENT/Dental Normal           Cardiovascular:     Hypertension   CAD  asymptomatic                                       Pulmonary:  Pulmonary Normal                       Renal/:   renal calculi               Hepatic/GI:  Hepatic/GI Normal                 Musculoskeletal:  Musculoskeletal Normal                Neurological:  Neurology Normal                                      Endocrine:  Endocrine Normal            Dermatological:  Skin Normal    Psych:    depression                Physical Exam  General: Well nourished and Alert    Airway:  Mallampati: II   Mouth Opening: Normal  TM Distance: Normal  Tongue: Normal  Neck ROM: Normal ROM    Dental:  Intact    Chest/Lungs:  Clear to auscultation, Normal Respiratory Rate    Heart:  Rate: Normal  Rhythm: Regular Rhythm  Sounds: Normal        Anesthesia Plan  Type of Anesthesia, risks & benefits discussed:    Anesthesia Type: Gen ETT  Intra-op Monitoring Plan: Standard ASA Monitors  Post Op Pain Control Plan: multimodal analgesia  Induction:  IV  Informed Consent: Informed consent signed with the Patient and all parties understand the risks and agree with anesthesia plan.  All questions answered. Patient consented to blood products? Yes  ASA Score: 3  Anesthesia Plan Notes: Tylenol  Zofran, Pepcid, Benadryl    Ready For Surgery From Anesthesia Perspective.     .

## 2024-06-13 NOTE — TRANSFER OF CARE
"Anesthesia Transfer of Care Note    Patient: Eva Joseph    Procedure(s) Performed: Procedure(s) (LRB):  CHOLECYSTECTOMY, LAPAROSCOPIC (N/A)    Patient location: PACU    Transport from OR: Transported from OR on room air with adequate spontaneous ventilation    Post pain: adequate analgesia    Post assessment: no apparent anesthetic complications    Post vital signs: stable    Level of consciousness: awake, alert and oriented    Nausea/Vomiting: no nausea/vomiting    Complications: none    Transfer of care protocol was followed      Last vitals: Visit Vitals  /68 (BP Location: Left arm, Patient Position: Lying)   Pulse (!) 58   Temp 36.8 °C (98.2 °F) (Oral)   Resp 16   Ht 5' 3" (1.6 m)   Wt 62.6 kg (138 lb)   LMP  (LMP Unknown)   SpO2 97%   Breastfeeding No   BMI 24.45 kg/m²     "

## 2024-06-13 NOTE — OP NOTE
DATE OF PROCEDURE: 06/13/2024    PREOPERATIVE DIAGNOSIS: Symptomatic cholelithiasis    POSTOPERATIVE DIAGNOSIS: Same    PROCEDURE: Laparoscopic cholecystectomy    SURGEON: Shaheen Salinas M.D    ASSISTANT: Tiffany Gooden CSA    ANESTHESIA: General endotracheal    ESTIMATED BLOOD LOSS: Minimal    SPECIMEN: Gallbladder    CONDITION: Stable    COMPLICATIONS: None    FINDINGS:   Gallbladder grossly normal other than omental adhesions  Critical view of safety achieved  No spillage of stones or bile    INDICATIONS: The patient is a 74 y.o. female who presented to clinic with a  history of right upper quadrant epigastric pain suggestive of biliary etiology. The patient was counseled on their surgical options and desired surgical intervention. The risks of the procedure were described to the patient including pain, infection, bleeding, scarring, wound complications, injury to local structures such as bile duct, liver or intestine, warranting more extensive surgery, retained common bile duct stone or need for further intervention. The patient demonstrated understanding of these risks and a consent form was obtained.    PROCEDURE IN DETAIL: PROCEDURE IN DETAIL: The patient was identified in the Preoperative Unit and taken back to the Operating Room and laid supine on the operating room table. IV antibiotics were administered and general anesthesia was induced without complication. The patient was then prepped and draped in the standard sterile fashion. A timeout was performed in accordance with hospital protocol.  A Veress needle was introduced into the abdominal cavity and insufflation was attached. We had appropriate initial pressures and pneumoperitoneum was achieved. Local anesthetic was injected then a stab incision was sharply made just above the umbilicus. A 5 mm Optiview trocar was introduced into the peritoneal cavity under direct visualization.  We examined the trocar and Veress needle insertion sites and no  obvious injury was identified. An 11 mm trocar was then placed in subxiphoid region under direct visualization after injecting local anesthetic.  Two additional 5 mm trocars were placed in the right mid and right lateral abdomen under direct visualization again after injecting local anesthetic. The gallbladder was identified and found to be grossly normal other than omental adhesions which were taken down through a combination of blunt and sharp dissection. The gallbladder fundus was grasped and retracted into the right upper quadrant and the infundibulum retracted laterally. The peritoneum over the infundibulum and cystic structures was then gently stripped until the cystic duct and artery were identified and the critical view of safety was achieved.The cystic duct and artery were doubly clipped proximally and once distally and then divided. The gallbladder was then dissected off the gallbladder fossa using electrocautery. Once dissection was completed, the gallbladder was placed into an EndoCatch bag and removed through the subxiphoid port.  The dissection field was inspected for hemostasis, bile leak and to confirmed clips were in place. Additional local anesthetic was injected around the port sites under direct visualization.  The subxiphoid fascial incision was closed with a 0 Vicryl suture. The abdomen was desufflated and ports removed. Additional local anesthetic was injected and all the skin incisions were closed using a 4-0 Monocryl subcuticular stitch. Dermabond was then applied. The patient was awakened from general anesthesia without complication and returned to the Postoperative Recovery Unit in stable condition. At the end of the case, sponge, instrument and needle counts were correct on 2 occasions. I was present and scrubbed throughout the entirety of the case.

## 2024-06-13 NOTE — ANESTHESIA POSTPROCEDURE EVALUATION
Anesthesia Post Evaluation    Patient: Eva Joseph    Procedure(s) Performed: Procedure(s) (LRB):  CHOLECYSTECTOMY, LAPAROSCOPIC (N/A)    Final Anesthesia Type: general      Patient location during evaluation: PACU  Patient participation: Yes- Able to Participate  Level of consciousness: awake and alert and oriented  Post-procedure vital signs: reviewed and stable  Pain management: adequate  Airway patency: patent    PONV status at discharge: No PONV  Anesthetic complications: no      Cardiovascular status: blood pressure returned to baseline, hemodynamically stable and stable  Respiratory status: unassisted, spontaneous ventilation and room air  Hydration status: euvolemic  Follow-up not needed.              Vitals Value Taken Time   /63 06/13/24 1115   Temp 36.2 °C (97.2 °F) 06/13/24 1025   Pulse 69 06/13/24 1127   Resp 16 06/13/24 1158   SpO2 96 % 06/13/24 1127   Vitals shown include unfiled device data.      Event Time   Out of Recovery 11:26:00         Pain/Ayah Score: Pain Rating Prior to Med Admin: 4 (6/13/2024 11:58 AM)  Ayah Score: 10 (6/13/2024 11:15 AM)

## 2024-07-01 ENCOUNTER — OFFICE VISIT (OUTPATIENT)
Dept: SURGERY | Facility: CLINIC | Age: 74
End: 2024-07-01
Payer: MEDICARE

## 2024-07-01 VITALS — TEMPERATURE: 99 F | DIASTOLIC BLOOD PRESSURE: 70 MMHG | SYSTOLIC BLOOD PRESSURE: 105 MMHG | HEART RATE: 69 BPM

## 2024-07-01 DIAGNOSIS — Z90.49 S/P LAPAROSCOPIC CHOLECYSTECTOMY: Primary | ICD-10-CM

## 2024-07-01 PROCEDURE — 3288F FALL RISK ASSESSMENT DOCD: CPT | Mod: CPTII,S$GLB,, | Performed by: SURGERY

## 2024-07-01 PROCEDURE — 99999 PR PBB SHADOW E&M-EST. PATIENT-LVL II: CPT | Mod: PBBFAC,,, | Performed by: SURGERY

## 2024-07-01 PROCEDURE — 3061F NEG MICROALBUMINURIA REV: CPT | Mod: CPTII,S$GLB,, | Performed by: SURGERY

## 2024-07-01 PROCEDURE — 3078F DIAST BP <80 MM HG: CPT | Mod: CPTII,S$GLB,, | Performed by: SURGERY

## 2024-07-01 PROCEDURE — 3044F HG A1C LEVEL LT 7.0%: CPT | Mod: CPTII,S$GLB,, | Performed by: SURGERY

## 2024-07-01 PROCEDURE — 1126F AMNT PAIN NOTED NONE PRSNT: CPT | Mod: CPTII,S$GLB,, | Performed by: SURGERY

## 2024-07-01 PROCEDURE — 3066F NEPHROPATHY DOC TX: CPT | Mod: CPTII,S$GLB,, | Performed by: SURGERY

## 2024-07-01 PROCEDURE — 1101F PT FALLS ASSESS-DOCD LE1/YR: CPT | Mod: CPTII,S$GLB,, | Performed by: SURGERY

## 2024-07-01 PROCEDURE — 3074F SYST BP LT 130 MM HG: CPT | Mod: CPTII,S$GLB,, | Performed by: SURGERY

## 2024-07-01 PROCEDURE — 99024 POSTOP FOLLOW-UP VISIT: CPT | Mod: S$GLB,,, | Performed by: SURGERY

## 2024-07-01 NOTE — PROGRESS NOTES
GENERAL SURGERY  POST-OP PROGRESS NOTE    HPI: Eva Joseph is a 74 y.o. female status post lap delio for symptomatic cholelithiasis here for follow-up.  Doing well currently. No fevers, chills, nausea, vomiting.  Tolerating diet. No increase in bowel function.    VITALS:  Vitals:    07/01/24 0912   BP: 105/70   Pulse: 69   Temp: 98.5 °F (36.9 °C)       PHYSICAL EXAM:  All abdominal port sites healing well without signs of infection or breakdown  PATHOLOGY:  GALLBLADDER:     - CHRONIC CHOLECYSTITIS.     - CHOLESTEROLOSIS.     - CHOLELITHIASIS.     - NEGATIVE FOR DYSPLASIA OR MALIGNANCY._     ASSESSMENT & PLAN:  74 y.o. female s/p lap delio   -overall doing well  -no further symptoms  -diet as tolerated  -okay to submerge incisions  -after another 2 weeks no restrictions  -return to clinic p.r.n.

## 2024-07-30 ENCOUNTER — PROCEDURE VISIT (OUTPATIENT)
Dept: DERMATOLOGY | Facility: CLINIC | Age: 74
End: 2024-07-30
Payer: MEDICARE

## 2024-07-30 VITALS — HEIGHT: 63 IN | WEIGHT: 138 LBS | BODY MASS INDEX: 24.45 KG/M2

## 2024-07-30 DIAGNOSIS — L72.0 MILIA: Primary | ICD-10-CM

## 2024-07-30 NOTE — PROGRESS NOTES
Subjective:      Patient ID:  Eva Joseph is a 74 y.o. female who presents for   Chief Complaint   Patient presents with    milia     LOV: 6/3/24 - telogen effluvium, nevi, SK, seb hyper, lentigo, female pattern alopecia    Patient here for milia removal on face    Derm Hx:  Denies Phx of NMSC  Denies Fhx of MM    Current Outpatient Medications:   ·  alendronate (FOSAMAX) 70 MG tablet, Take one tablet every 7 days. (Patient taking differently: Take 70 mg by mouth every 7 days. Take one tablet every 7 days.), Disp: 12 tablet, Rfl: 3  ·  amino acids-minerals Tab, Take 2 tablets by mouth 2 (two) times a day., Disp: , Rfl:   ·  ascorbic acid, vitamin C, (VITAMIN C) 100 MG tablet, Take 500 mg by mouth once daily., Disp: , Rfl:   ·  betamethasone dipropionate (DIPROLENE) 0.05 % lotion, Apply topically 2 (two) times daily., Disp: 60 mL, Rfl: 2  ·  coenzyme Q10-vitamin E 100-100 mg-unit Cap, Take by mouth Daily. 1 Capsule Oral Every day, Disp: , Rfl:   ·  fish oil-dha-epa 1,200-144-216 mg Cap, Take by mouth Daily. 1 Capsule Oral Every day, Disp: , Rfl:   ·  glucosamine-chondroitin 500-400 mg tablet, Take 1 tablet by mouth 3 (three) times daily., Disp: , Rfl:   ·  HYDROcodone-acetaminophen (NORCO) 5-325 mg per tablet, Take 1 tablet by mouth every 6 (six) hours as needed for Pain., Disp: 20 tablet, Rfl: 0  ·  hydrOXYzine HCL (ATARAX) 10 MG Tab, Take 1 tablet (10 mg total) by mouth 3 (three) times daily as needed., Disp: 60 tablet, Rfl: 1  ·  ketoconazole (NIZORAL) 2 % shampoo, Apply topically twice a week., Disp: 120 mL, Rfl: 2  ·  minoxidiL (LONITEN) 2.5 MG tablet, Take 1/2 tablet PO QHS (Patient taking differently: Take by mouth once daily. Take 1/2 tablet PO QHS), Disp: 45 tablet, Rfl: 3  ·  multivitamin (THERAGRAN) per tablet, Take by mouth once daily. 1 Tablet Oral Every day, Disp: , Rfl:   ·  rosuvastatin (CRESTOR) 5 MG tablet, Take 1 tablet (5 mg total) by mouth once daily., Disp: 90 tablet, Rfl: 3  ·   valsartan-hydrochlorothiazide (DIOVAN-HCT) 160-12.5 mg per tablet, Take 1 tablet by mouth once daily., Disp: 90 tablet, Rfl: 3  ·  vitamin D3-vitamin K2 1,250-200 mcg Cap, Daily., Disp: , Rfl:   ·  aspirin (ECOTRIN) 81 MG EC tablet, Take 1 tablet (81 mg total) by mouth once daily., Disp: , Rfl: 0          Review of Systems   Constitutional:  Negative for fever, chills and fatigue. Weight gain: covid.  HENT:  Negative for headaches.    Respiratory:  Negative for cough and shortness of breath.    Skin:  Positive for daily sunscreen use, activity-related sunscreen use and wears hat. Negative for itching, rash and dry skin.   Neurological:  Negative for headaches.   Hematologic/Lymphatic: Bruises/bleeds easily.       Objective:   Physical Exam   Constitutional: She appears well-developed and well-nourished. No distress.   HENT:   Mouth/Throat: Lips normal.    Eyes: Lids are normal.    Neurological: She is alert and oriented to person, place, and time. She is not disoriented.   Psychiatric: She has a normal mood and affect. She is not agitated.   Skin:   Areas Examined (abnormalities noted in diagram):   Head / Face Inspection Performed                     Diagram Legend     Erythematous scaling macule/papule c/w actinic keratosis       Vascular papule c/w angioma      Pigmented verrucoid papule/plaque c/w seborrheic keratosis      Yellow umbilicated papule c/w sebaceous hyperplasia      Irregularly shaped tan macule c/w lentigo     1-2 mm smooth white papules consistent with Milia      Movable subcutaneous cyst with punctum c/w epidermal inclusion cyst      Subcutaneous movable cyst c/w pilar cyst      Firm pink to brown papule c/w dermatofibroma      Pedunculated fleshy papule(s) c/w skin tag(s)      Evenly pigmented macule c/w junctional nevus     Mildly variegated pigmented, slightly irregular-bordered macule c/w mildly atypical nevus      Flesh colored to evenly pigmented papule c/w intradermal nevus       Pink  pearly papule/plaque c/w basal cell carcinoma      Erythematous hyperkeratotic cursted plaque c/w SCC      Surgical scar with no sign of skin cancer recurrence      Open and closed comedones      Inflammatory papules and pustules      Verrucoid papule consistent consistent with wart     Erythematous eczematous patches and plaques     Dystrophic onycholytic nail with subungual debris c/w onychomycosis     Umbilicated papule    Erythematous-base heme-crusted tan verrucoid plaque consistent with inflamed seborrheic keratosis     Erythematous Silvery Scaling Plaque c/w Psoriasis     See annotation      Assessment / Plan:        Evette    Verbal consent obtained. 3 lesions removed with incision and pinch removal with forceps. Hemostasis achieved with pressure. Vaseline for wound care. No complications.           Follow up if symptoms worsen or fail to improve.

## 2024-08-09 ENCOUNTER — PATIENT MESSAGE (OUTPATIENT)
Dept: ENDOCRINOLOGY | Facility: CLINIC | Age: 74
End: 2024-08-09
Payer: MEDICARE

## 2024-09-18 ENCOUNTER — OFFICE VISIT (OUTPATIENT)
Dept: FAMILY MEDICINE | Facility: CLINIC | Age: 74
End: 2024-09-18
Payer: MEDICARE

## 2024-09-18 VITALS
SYSTOLIC BLOOD PRESSURE: 104 MMHG | DIASTOLIC BLOOD PRESSURE: 66 MMHG | HEART RATE: 61 BPM | WEIGHT: 138.38 LBS | RESPIRATION RATE: 14 BRPM | OXYGEN SATURATION: 98 % | HEIGHT: 63 IN | BODY MASS INDEX: 24.52 KG/M2

## 2024-09-18 DIAGNOSIS — R79.9 ABNORMAL FINDING OF BLOOD CHEMISTRY, UNSPECIFIED: ICD-10-CM

## 2024-09-18 DIAGNOSIS — E53.8 B12 DEFICIENCY: ICD-10-CM

## 2024-09-18 DIAGNOSIS — E78.2 MIXED HYPERLIPIDEMIA: ICD-10-CM

## 2024-09-18 DIAGNOSIS — E05.20: ICD-10-CM

## 2024-09-18 DIAGNOSIS — I70.0 AORTIC ATHEROSCLEROSIS: ICD-10-CM

## 2024-09-18 DIAGNOSIS — I10 ESSENTIAL HYPERTENSION: Primary | ICD-10-CM

## 2024-09-18 DIAGNOSIS — K21.9 GASTROESOPHAGEAL REFLUX DISEASE, UNSPECIFIED WHETHER ESOPHAGITIS PRESENT: ICD-10-CM

## 2024-09-18 DIAGNOSIS — Z00.00 ANNUAL PHYSICAL EXAM: ICD-10-CM

## 2024-09-18 DIAGNOSIS — Z23 ENCOUNTER FOR ADMINISTRATION OF VACCINE: ICD-10-CM

## 2024-09-18 DIAGNOSIS — E21.0 PRIMARY HYPERPARATHYROIDISM: ICD-10-CM

## 2024-09-18 PROCEDURE — 3044F HG A1C LEVEL LT 7.0%: CPT | Mod: CPTII,S$GLB,, | Performed by: FAMILY MEDICINE

## 2024-09-18 PROCEDURE — 1126F AMNT PAIN NOTED NONE PRSNT: CPT | Mod: CPTII,S$GLB,, | Performed by: FAMILY MEDICINE

## 2024-09-18 PROCEDURE — 3008F BODY MASS INDEX DOCD: CPT | Mod: CPTII,S$GLB,, | Performed by: FAMILY MEDICINE

## 2024-09-18 PROCEDURE — 1159F MED LIST DOCD IN RCRD: CPT | Mod: CPTII,S$GLB,, | Performed by: FAMILY MEDICINE

## 2024-09-18 PROCEDURE — 3061F NEG MICROALBUMINURIA REV: CPT | Mod: CPTII,S$GLB,, | Performed by: FAMILY MEDICINE

## 2024-09-18 PROCEDURE — 99999 PR PBB SHADOW E&M-EST. PATIENT-LVL III: CPT | Mod: PBBFAC,,, | Performed by: FAMILY MEDICINE

## 2024-09-18 PROCEDURE — 3074F SYST BP LT 130 MM HG: CPT | Mod: CPTII,S$GLB,, | Performed by: FAMILY MEDICINE

## 2024-09-18 PROCEDURE — 1101F PT FALLS ASSESS-DOCD LE1/YR: CPT | Mod: CPTII,S$GLB,, | Performed by: FAMILY MEDICINE

## 2024-09-18 PROCEDURE — 99214 OFFICE O/P EST MOD 30 MIN: CPT | Mod: S$GLB,,, | Performed by: FAMILY MEDICINE

## 2024-09-18 PROCEDURE — 90653 IIV ADJUVANT VACCINE IM: CPT | Mod: S$GLB,,, | Performed by: FAMILY MEDICINE

## 2024-09-18 PROCEDURE — 3288F FALL RISK ASSESSMENT DOCD: CPT | Mod: CPTII,S$GLB,, | Performed by: FAMILY MEDICINE

## 2024-09-18 PROCEDURE — G0008 ADMIN INFLUENZA VIRUS VAC: HCPCS | Mod: S$GLB,,, | Performed by: FAMILY MEDICINE

## 2024-09-18 PROCEDURE — 3078F DIAST BP <80 MM HG: CPT | Mod: CPTII,S$GLB,, | Performed by: FAMILY MEDICINE

## 2024-09-18 PROCEDURE — 3066F NEPHROPATHY DOC TX: CPT | Mod: CPTII,S$GLB,, | Performed by: FAMILY MEDICINE

## 2024-09-18 PROCEDURE — G2211 COMPLEX E/M VISIT ADD ON: HCPCS | Mod: S$GLB,,, | Performed by: FAMILY MEDICINE

## 2024-09-18 RX ORDER — PENICILLIN V POTASSIUM 500 MG/1
500 TABLET, FILM COATED ORAL EVERY 6 HOURS
COMMUNITY
Start: 2024-08-23 | End: 2024-09-18

## 2024-09-18 RX ORDER — PANTOPRAZOLE SODIUM 40 MG/1
40 TABLET, DELAYED RELEASE ORAL DAILY
Qty: 90 TABLET | Refills: 3 | Status: SHIPPED | OUTPATIENT
Start: 2024-09-18 | End: 2025-09-18

## 2024-09-18 RX ORDER — ZOSTER VACCINE RECOMBINANT, ADJUVANTED 50 MCG/0.5
0.5 KIT INTRAMUSCULAR ONCE
Qty: 1 EACH | Refills: 1 | Status: SHIPPED | OUTPATIENT
Start: 2024-09-18 | End: 2024-09-18

## 2024-09-18 NOTE — PROGRESS NOTES
Subjective:       Patient ID: Eva Joseph is a 74 y.o. female.    Chief Complaint: Follow-up (6 month)      Past Medical History:   Diagnosis Date    Breast cancer 2000    also in 2013    Depression     Essential hypertension 5/18/2022    Hypertriglyceridemia 7/21/2019    Nephrolithiasis 2/5/2015    Osteopenia        Past Surgical History:   Procedure Laterality Date    ADENOIDECTOMY      BILATERAL OOPHORECTOMY      benign    BREAST LUMPECTOMY  2000    Right breast    BREAST RECONSTRUCTION Bilateral 2013    CATARACT EXTRACTION, BILATERAL      Femoral hernia  2010    right side repair    HEMORRHOID SURGERY      HYSTERECTOMY  1998    KELSEY, fibroid    LAPAROSCOPIC CHOLECYSTECTOMY N/A 6/13/2024    Procedure: CHOLECYSTECTOMY, LAPAROSCOPIC;  Surgeon: Shaheen Salinas Jr., MD;  Location: [facility];  Service: General;  Laterality: N/A;    MASTECTOMY  2013    bilateral     OOPHORECTOMY      TONSILLECTOMY          Social History     Socioeconomic History    Marital status:    Tobacco Use    Smoking status: Never    Smokeless tobacco: Never   Substance and Sexual Activity    Alcohol use: Yes     Alcohol/week: 1.0 standard drink of alcohol     Types: 1 Glasses of wine per week     Comment: Occasionally    Drug use: No    Sexual activity: Yes     Partners: Male     Birth control/protection: Post-menopausal     Comment:      Social Determinants of Health     Financial Resource Strain: Low Risk  (4/24/2024)    Overall Financial Resource Strain (CARDIA)     Difficulty of Paying Living Expenses: Not hard at all   Food Insecurity: No Food Insecurity (4/24/2024)    Hunger Vital Sign     Worried About Running Out of Food in the Last Year: Never true     Ran Out of Food in the Last Year: Never true   Transportation Needs: No Transportation Needs (4/24/2024)    PRAPARE - Transportation     Lack of Transportation (Medical): No     Lack of Transportation (Non-Medical): No   Physical Activity: Sufficiently Active  (4/24/2024)    Exercise Vital Sign     Days of Exercise per Week: 4 days     Minutes of Exercise per Session: 40 min   Stress: Stress Concern Present (4/24/2024)    [employer] [place] [employer] - Occupational Stress Questionnaire     Feeling of Stress : Very much   Housing Stability: Unknown (4/24/2024)    Housing Stability Vital Sign     Unable to Pay for Housing in the Last Year: No       Family History   Problem Relation Name Age of Onset    Breast cancer Mother          Breast cancer-75yrs    Hypertension Father      Heart disease Father  56        MI    Cancer Maternal Aunt          ovarian    Ovarian cancer Maternal Aunt      Cancer Maternal Grandfather          testicular / prostate    Stroke Paternal Grandmother      Breast cancer Maternal Grandmother          Breast cancer-42yrs    Parkinsonism Paternal Grandfather      No Known Problems Sister      Skin cancer Brother      Hyperlipidemia Son          As a child    No Known Problems Daughter      No Known Problems Daughter      No Known Problems Sister      No Known Problems Brother      Amblyopia Neg Hx      Blindness Neg Hx      Cataracts Neg Hx      Diabetes Neg Hx      Glaucoma Neg Hx      Macular degeneration Neg Hx      Retinal detachment Neg Hx      Strabismus Neg Hx      Thyroid disease Neg Hx      Colon cancer Neg Hx      Melanoma Neg Hx         Review of patient's allergies indicates:   Allergen Reactions    Sulfa (sulfonamide antibiotics) Other (See Comments)     Other reaction(s): Swelling          Current Outpatient Medications:     alendronate (FOSAMAX) 70 MG tablet, Take one tablet every 7 days., Disp: 12 tablet, Rfl: 3    aspirin (ECOTRIN) 81 MG EC tablet, Take 1 tablet (81 mg total) by mouth once daily., Disp: , Rfl: 0    coenzyme Q10-vitamin E 100-100 mg-unit Cap, Take by mouth Daily. 1 Capsule Oral Every day, Disp: , Rfl:     fish oil-dha-epa 1,200-144-216 mg Cap, Take by mouth Daily. 1 Capsule Oral Every day, Disp: , Rfl:      glucosamine-chondroitin 500-400 mg tablet, Take 1 tablet by mouth 3 (three) times daily., Disp: , Rfl:     hydrOXYzine HCL (ATARAX) 10 MG Tab, Take 1 tablet (10 mg total) by mouth 3 (three) times daily as needed., Disp: 60 tablet, Rfl: 1    ketoconazole (NIZORAL) 2 % shampoo, Apply topically twice a week., Disp: 120 mL, Rfl: 2    minoxidiL (LONITEN) 2.5 MG tablet, Take 1/2 tablet PO QHS (Patient taking differently: Take by mouth once daily. Take 1/2 tablet PO QHS), Disp: 45 tablet, Rfl: 3    multivitamin (THERAGRAN) per tablet, Take by mouth once daily. 1 Tablet Oral Every day, Disp: , Rfl:     rosuvastatin (CRESTOR) 5 MG tablet, Take 1 tablet (5 mg total) by mouth once daily., Disp: 90 tablet, Rfl: 3    valsartan-hydrochlorothiazide (DIOVAN-HCT) 160-12.5 mg per tablet, Take 1 tablet by mouth once daily., Disp: 90 tablet, Rfl: 3    vitamin D3-vitamin K2 1,250-200 mcg Cap, Daily., Disp: , Rfl:     pantoprazole (PROTONIX) 40 MG tablet, Take 1 tablet (40 mg total) by mouth once daily., Disp: 90 tablet, Rfl: 3    varicella-zoster [id number], PF, (SHINGRIX, PF,) 50 mcg/0.5 mL injection, Inject 0.5 mLs into the muscle once. for 1 dose, Disp: 1 each, Rfl: 1    Current Facility-Administered Medications:     influenza (adjuvanted) (Fluad) 45 mcg/0.5 mL IM vaccine (> or = 66 yo) 0.5 mL, 0.5 mL, Intramuscular, 1 time in Clinic/HOD,     Ms. Claudia Iverson is a 74-year-old female with hypothyroid, hyperparathyroidism, LVH, vitamin-D deficiency, mixed hyperlipidemia and aortic calcification.  She also has osteopenia and alopecia. She is very active and plays pickle ball frequently.  She is here today for a routine medical exam.  Her last labs were done 2024 and include the followin. CBC within normal range  2. CMP within normal range     Routine yearly labs do need to be ordered and she does need some prescription refills  She does have an interim hospital encounter on 2024.  She had symptomatic cholelithiasis and  she had a cholecystectomy.  Her postop visit on 07/01/2024 with Dr. Salinas showed routine healing and she was released    Follow-up  Associated symptoms include fatigue. Pertinent negatives include no abdominal pain, chest pain, chills, congestion, coughing, diaphoresis, fever, nausea, rash or sore throat.     Review of Systems   Constitutional:  Positive for fatigue. Negative for activity change, appetite change, chills, diaphoresis and fever.   HENT:  Positive for dental problem. Negative for congestion, ear pain, postnasal drip, rhinorrhea and sore throat.         Recent root canal.    Eyes:  Negative for pain, discharge, redness and itching.   Respiratory:  Negative for cough and shortness of breath.    Cardiovascular:  Negative for chest pain, palpitations and leg swelling.   Gastrointestinal:  Negative for abdominal distention, abdominal pain, constipation, diarrhea and nausea.   Genitourinary:  Negative for difficulty urinating, dysuria, frequency and urgency.   Skin:  Negative for color change, rash and wound.   All other systems reviewed and are negative.      Objective:      Physical Exam  Vitals and nursing note reviewed.   Constitutional:       Appearance: Normal appearance. She is normal weight.   HENT:      Head: Normocephalic.      Nose: Nose normal.   Eyes:      Extraocular Movements: Extraocular movements intact.      Conjunctiva/sclera: Conjunctivae normal.      Pupils: Pupils are equal, round, and reactive to light.   Cardiovascular:      Rate and Rhythm: Normal rate and regular rhythm.      Pulses:           Dorsalis pedis pulses are 3+ on the right side and 3+ on the left side.        Posterior tibial pulses are 3+ on the right side and 3+ on the left side.      Heart sounds: Normal heart sounds. No murmur heard.  Pulmonary:      Effort: Pulmonary effort is normal. No respiratory distress.      Breath sounds: Normal breath sounds.   Musculoskeletal:         General: Normal range of motion.       Cervical back: Normal range of motion and neck supple.   Feet:      Right foot:      Protective Sensation: 6 sites tested.  6 sites sensed.      Skin integrity: Skin integrity normal.      Toenail Condition: Right toenails are normal.      Left foot:      Protective Sensation: 6 sites tested.  6 sites sensed.      Skin integrity: Skin integrity normal.      Toenail Condition: Left toenails are normal.   Skin:     General: Skin is warm and dry.   Neurological:      General: No focal deficit present.      Mental Status: She is alert and oriented to person, place, and time.   Psychiatric:         Mood and Affect: Mood normal.         Behavior: Behavior normal.         Assessment:       1. Essential hypertension    2. Annual physical exam    3. Primary hyperparathyroidism    4. Mixed hyperlipidemia    5. B12 deficiency    6. Abnormal finding of blood chemistry, unspecified    7. Aortic atherosclerosis    8. Hyperthyroidism with palpable thyroid nodule    9. Gastroesophageal reflux disease, unspecified whether esophagitis present    10. Encounter for administration of vaccine        Plan:         Essential hypertension  -     Microalbumin/Creatinine Ratio, Urine; Future; Expected date: 09/18/2024  -     Comprehensive Metabolic Panel; Future; Expected date: 09/18/2024  -     Hemoglobin A1C; Future; Expected date: 09/18/2024    Annual physical exam  -     Microalbumin/Creatinine Ratio, Urine; Future; Expected date: 09/18/2024  -     Vitamin D; Future; Expected date: 09/18/2024  -     Vitamin B12; Future; Expected date: 09/18/2024  -     Lipid Panel; Future; Expected date: 09/18/2024  -     CBC Auto Differential; Future; Expected date: 09/18/2024  -     Comprehensive Metabolic Panel; Future; Expected date: 09/18/2024  -     Hemoglobin A1C; Future; Expected date: 09/18/2024  -     TSH; Future; Expected date: 09/18/2024    Primary hyperparathyroidism  -     Vitamin D; Future; Expected date: 09/18/2024  -      Comprehensive Metabolic Panel; Future; Expected date: 09/18/2024  -     Hemoglobin A1C; Future; Expected date: 09/18/2024  -     TSH; Future; Expected date: 09/18/2024    Mixed hyperlipidemia  -     Lipid Panel; Future; Expected date: 09/18/2024    B12 deficiency  -     Vitamin B12; Future; Expected date: 09/18/2024    Abnormal finding of blood chemistry, unspecified  -     Microalbumin/Creatinine Ratio, Urine; Future; Expected date: 09/18/2024  -     CBC Auto Differential; Future; Expected date: 09/18/2024  -     Comprehensive Metabolic Panel; Future; Expected date: 09/18/2024  -     Hemoglobin A1C; Future; Expected date: 09/18/2024  -     TSH; Future; Expected date: 09/18/2024    Aortic atherosclerosis  -     Lipid Panel; Future; Expected date: 09/18/2024    Hyperthyroidism with palpable thyroid nodule  -     TSH; Future; Expected date: 09/18/2024    Gastroesophageal reflux disease, unspecified whether esophagitis present  -     pantoprazole (PROTONIX) 40 MG tablet; Take 1 tablet (40 mg total) by mouth once daily.  Dispense: 90 tablet; Refill: 3        Risks, benefits, and side effects were discussed with the patient. All questions were answered to the fullest satisfaction of the patient, and pt verbalized understanding and agreement to treatment plan. Pt was to call with any new or worsening symptoms, or present to the ER.        Briseyda Quiñones MD

## 2024-10-09 ENCOUNTER — PATIENT MESSAGE (OUTPATIENT)
Dept: FAMILY MEDICINE | Facility: CLINIC | Age: 74
End: 2024-10-09
Payer: MEDICARE

## 2024-10-11 RX ORDER — AZITHROMYCIN 250 MG/1
TABLET, FILM COATED ORAL
Qty: 6 TABLET | Refills: 0 | Status: SHIPPED | OUTPATIENT
Start: 2024-10-11 | End: 2024-10-16

## 2024-10-16 ENCOUNTER — HOSPITAL ENCOUNTER (OUTPATIENT)
Dept: RADIOLOGY | Facility: HOSPITAL | Age: 74
Discharge: HOME | End: 2024-10-16
Attending: PHYSICIAN ASSISTANT
Payer: MEDICARE

## 2024-10-16 DIAGNOSIS — E04.1 THYROID NODULE: ICD-10-CM

## 2024-10-16 DIAGNOSIS — E21.0 PRIMARY HYPERPARATHYROIDISM: ICD-10-CM

## 2024-10-16 PROCEDURE — 76536 US EXAM OF HEAD AND NECK: CPT | Mod: 26,,, | Performed by: RADIOLOGY

## 2024-10-16 PROCEDURE — 76536 US EXAM OF HEAD AND NECK: CPT | Mod: TC

## 2024-11-07 ENCOUNTER — OFFICE VISIT (OUTPATIENT)
Dept: ENDOCRINOLOGY | Facility: CLINIC | Age: 74
End: 2024-11-07
Payer: MEDICARE

## 2024-11-07 VITALS
OXYGEN SATURATION: 98 % | SYSTOLIC BLOOD PRESSURE: 110 MMHG | DIASTOLIC BLOOD PRESSURE: 80 MMHG | TEMPERATURE: 98 F | HEIGHT: 63 IN | WEIGHT: 140 LBS | HEART RATE: 68 BPM | BODY MASS INDEX: 24.8 KG/M2

## 2024-11-07 DIAGNOSIS — Z78.0 POSTMENOPAUSAL: ICD-10-CM

## 2024-11-07 DIAGNOSIS — E78.1 HYPERTRIGLYCERIDEMIA: ICD-10-CM

## 2024-11-07 DIAGNOSIS — E21.0 PRIMARY HYPERPARATHYROIDISM: Primary | ICD-10-CM

## 2024-11-07 DIAGNOSIS — E55.9 HYPOVITAMINOSIS D: ICD-10-CM

## 2024-11-07 DIAGNOSIS — E04.1 THYROID NODULE: ICD-10-CM

## 2024-11-07 DIAGNOSIS — M81.0 OSTEOPOROSIS, UNSPECIFIED OSTEOPOROSIS TYPE, UNSPECIFIED PATHOLOGICAL FRACTURE PRESENCE: ICD-10-CM

## 2024-11-07 PROCEDURE — 3008F BODY MASS INDEX DOCD: CPT | Mod: CPTII,S$GLB,, | Performed by: PHYSICIAN ASSISTANT

## 2024-11-07 PROCEDURE — 1160F RVW MEDS BY RX/DR IN RCRD: CPT | Mod: CPTII,S$GLB,, | Performed by: PHYSICIAN ASSISTANT

## 2024-11-07 PROCEDURE — 1126F AMNT PAIN NOTED NONE PRSNT: CPT | Mod: CPTII,S$GLB,, | Performed by: PHYSICIAN ASSISTANT

## 2024-11-07 PROCEDURE — 3066F NEPHROPATHY DOC TX: CPT | Mod: CPTII,S$GLB,, | Performed by: PHYSICIAN ASSISTANT

## 2024-11-07 PROCEDURE — 99214 OFFICE O/P EST MOD 30 MIN: CPT | Mod: S$GLB,,, | Performed by: PHYSICIAN ASSISTANT

## 2024-11-07 PROCEDURE — 3079F DIAST BP 80-89 MM HG: CPT | Mod: CPTII,S$GLB,, | Performed by: PHYSICIAN ASSISTANT

## 2024-11-07 PROCEDURE — 99999 PR PBB SHADOW E&M-EST. PATIENT-LVL IV: CPT | Mod: PBBFAC,,, | Performed by: PHYSICIAN ASSISTANT

## 2024-11-07 PROCEDURE — 1101F PT FALLS ASSESS-DOCD LE1/YR: CPT | Mod: CPTII,S$GLB,, | Performed by: PHYSICIAN ASSISTANT

## 2024-11-07 PROCEDURE — 1159F MED LIST DOCD IN RCRD: CPT | Mod: CPTII,S$GLB,, | Performed by: PHYSICIAN ASSISTANT

## 2024-11-07 PROCEDURE — 3044F HG A1C LEVEL LT 7.0%: CPT | Mod: CPTII,S$GLB,, | Performed by: PHYSICIAN ASSISTANT

## 2024-11-07 PROCEDURE — 3288F FALL RISK ASSESSMENT DOCD: CPT | Mod: CPTII,S$GLB,, | Performed by: PHYSICIAN ASSISTANT

## 2024-11-07 PROCEDURE — 3061F NEG MICROALBUMINURIA REV: CPT | Mod: CPTII,S$GLB,, | Performed by: PHYSICIAN ASSISTANT

## 2024-11-07 PROCEDURE — 3074F SYST BP LT 130 MM HG: CPT | Mod: CPTII,S$GLB,, | Performed by: PHYSICIAN ASSISTANT

## 2024-11-07 NOTE — PROGRESS NOTES
CC: Hyperparathyroidism/Nodular thyroid disease and osteopenia    HPI: Eva Joseph is a 74 y.o. female here for nodular thyroid disease along with pending conditions listed in the Visit Diagnosis. No fhx of thyroid disease. Her grandson has Type 1 DM. Patient had bilateral breast cancer for which she was treated with bilateral mastectomy. She was -ve for BRCA1 and 2. 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 She had COVID-19 at [facility].    Thyroid u/s 10/24 shows 2.0 cm nodule in left lobe that had a benign FNA. Occ dysphagia (lower esophagus). No SOB or voice changes. + TPO.   + fatigue, hair loss, heat intolerance, mental fogginess. No palpitations, constipation, diarrhea or tremors.    Kidney stones 1970, 1977 w/ pregnancies. She also had one stone in 2005. No n/v, abdominal pain or muscle weakness.     DEXA scan: 10/23 osteopenia.   Spine: -1.8 (-2.4)  Lfn: -1.5  (-1.7)  Fx: 11%  Hfx: 2%    On fosamax 70 mg weekly since 8/21.   No fractures, falls or steriod injections.     Previous meds:  reclast for three years.     Taking ca & vitamin d.     Exercise: pickle ball once weekly, line dancing 2x weekly and the gym 2x weekly (sydnee class). She also does a stretching class.     No steriod injections.    Pt is taking atarax for itching on her scalp.    PMHx, PSHx: reviewed in epic.  Social Hx: no ETOH/tobacco use.     Wt Readings from Last 10 Encounters:   11/07/24 63.5 kg (139 lb 15.9 oz)   09/18/24 62.8 kg (138 lb 6.4 oz)   07/30/24 62.6 kg (138 lb 0.1 oz)   06/13/24 62.6 kg (138 lb)   06/03/24 62.6 kg (138 lb 0.1 oz)   05/14/24 62.6 kg (138 lb)   04/24/24 62.9 kg (138 lb 10.7 oz)   04/06/24 61.7 kg (136 lb)   03/20/24 61.6 kg (135 lb 12.8 oz)   11/08/23 61.2 kg (134 lb 14.4 oz)      ROS:   Constitutional: no fatigue, wt gain (5 lbs).  Eyes: No recent visual changes  Cardiovascular: + feet sweling, Denies current anginal symptoms  Respiratory: Denies current  "respiratory difficulty  Gastrointestinal: Denies recent bowel disturbances  GenitoUrinary - No dysuria  Skin: + scalp itching  Neurologic: + numbness and tingling in feet  Endocrine: no polyphagia, polydipsia or polyuria  Psych: no anxiety or depression  Remainder ROS negative     /80 (BP Location: Right arm, Patient Position: Sitting)   Pulse 68   Temp 98.4 °F (36.9 °C) (Oral)   Ht 5' 3" (1.6 m)   Wt 63.5 kg (139 lb 15.9 oz)   LMP  (LMP Unknown)   SpO2 98%   BMI 24.80 kg/m²      Personally reviewed labs below:  Lab Results   Component Value Date    TSH 2.015 10/16/2024    TSH 2.015 10/16/2024    O4LGOPT 80 01/22/2020    FREET4 1.02 10/16/2024     Lab Results   Component Value Date    PTH 66.8 10/16/2024    CALCIUM 9.5 10/16/2024    CALCIUM 9.5 10/16/2024    CAION 1.20 10/16/2024    PHOS 2.6 (L) 10/11/2022         Chemistry        Component Value Date/Time     10/16/2024 0801     10/16/2024 0801    K 3.8 10/16/2024 0801    K 3.8 10/16/2024 0801     10/16/2024 0801     10/16/2024 0801    CO2 26 10/16/2024 0801    CO2 26 10/16/2024 0801    BUN 19 10/16/2024 0801    BUN 19 10/16/2024 0801    CREATININE 0.7 10/16/2024 0801    CREATININE 0.7 10/16/2024 0801    GLU 97 10/16/2024 0801    GLU 97 10/16/2024 0801        Component Value Date/Time    CALCIUM 9.5 10/16/2024 0801    CALCIUM 9.5 10/16/2024 0801    ALKPHOS 63 10/16/2024 0801    ALKPHOS 63 10/16/2024 0801    AST 22 10/16/2024 0801    AST 22 10/16/2024 0801    ALT 22 10/16/2024 0801    ALT 22 10/16/2024 0801    BILITOT 0.7 10/16/2024 0801    BILITOT 0.7 10/16/2024 0801    ESTGFRAFRICA >60.0 12/16/2021 0832    EGFRNONAA >60.0 12/16/2021 0832         Lab Results   Component Value Date    HGBA1C 5.2 10/16/2024    HGBA1C 5.2 03/20/2024      EXAMINATION:  US SOFT TISSUE HEAD NECK THYROID     CLINICAL HISTORY:  Nontoxic single thyroid nodule     TECHNIQUE:  Ultrasound of the thyroid and cervical lymph nodes was performed.   "   COMPARISON:  Ultrasound 01/13/2022.     FINDINGS:  Thyroid lobes are prominent size measuring 5.2 x 1.6 x 1.7 cm on the right and 5.1 x 2.0 x 1.8 cm on left.  This measures to a volume of 16.9 cc.  The isthmus measures 0.40 cm.  Thyroid lobes demonstrate normal blood flow by color Doppler.     There are mixed cystic and solid thyroid nodules.  Dominant nodule on the right measures 0.6 x 0.3 x 0.3 cm.  This does not meet criteria for FNA.  Dominant nodule left measures 1.8 x 1.4 x 1.4 cm.  This is a solid TI-RADS 3 nodule and meets criteria for ultrasound follow-up.     Multiple small benign appearing cervical lymph nodes are identified.     Impression:     1. Chronic multinodular thyroid.  2. Left TI-RADS 3 nodule which meets criteria for ultrasound follow-up.        Electronically signed by: Hipolito Chaudhary  Date:                                            10/11/2022  Time:                                           14:34    PE:  GENERAL: elderly female (looks younger than stated age), well developed, well nourished  RESPIRATORY: Normal effort,   NEURO: steady gait, CN ll-Xll grossly intact  SKIN: no rash seen on scalp  Psych: normal mood and affect    Assessment/Plan:   1. Primary hyperparathyroidism        2. Osteoporosis, unspecified osteoporosis type, unspecified pathological fracture presence        3. Thyroid nodule  T4, Free    TSH    US Thyroid    Comprehensive Metabolic Panel      4. Postmenopausal  DXA Bone Density Axial Skeleton 1 or more sites      5. Hypovitaminosis D  Vitamin D      6. Hypertriglyceridemia  Lipid Panel        Primary hyperparathyroidism-PTH wnl. Ca normal.  Osteoporosis-continue fosamax, ca and vd. Repeat DEXA 10/25. Continue exercise.  Thyroid nodule-TSH wnl. Repeat thyroid u/s 10/25.  Postmenopausal-see above  Hypovitaminosis U-uopufr-escrfpkk otc vd  Yfdizfjctsuhbzaykqqa-dvmxld-vkunnrdz fish oil, statin and ASA.  Itching-continue atarax 10 mg daily.  HTN-elevated-continue  meds.      F/u in 10/25-vd, cmp, tfts, lp, dexa and u/s

## 2024-12-30 RX ORDER — ALENDRONATE SODIUM 70 MG/1
TABLET ORAL
Qty: 12 TABLET | Refills: 3 | Status: SHIPPED | OUTPATIENT
Start: 2024-12-30

## 2025-03-14 DIAGNOSIS — R03.0 ELEVATED BLOOD PRESSURE READING: ICD-10-CM

## 2025-03-14 RX ORDER — ROSUVASTATIN CALCIUM 5 MG/1
5 TABLET, COATED ORAL
Qty: 90 TABLET | Refills: 1 | Status: SHIPPED | OUTPATIENT
Start: 2025-03-14

## 2025-03-14 NOTE — TELEPHONE ENCOUNTER
Refill Decision Note   Eva Joseph  is requesting a refill authorization.  Brief Assessment and Rationale for Refill:  Approve     Medication Therapy Plan:         Comments:     Note composed:10:45 AM 03/14/2025

## 2025-03-14 NOTE — TELEPHONE ENCOUNTER
No care due was identified.  [facility] Embedded Care Due Messages. Reference number: [id number].   3/14/2025 5:31:41 AM CDT

## 2025-03-17 ENCOUNTER — OFFICE VISIT (OUTPATIENT)
Dept: FAMILY MEDICINE | Facility: CLINIC | Age: 75
End: 2025-03-17
Payer: MEDICARE

## 2025-03-17 VITALS
DIASTOLIC BLOOD PRESSURE: 64 MMHG | HEIGHT: 63 IN | WEIGHT: 140.63 LBS | RESPIRATION RATE: 14 BRPM | HEART RATE: 54 BPM | SYSTOLIC BLOOD PRESSURE: 116 MMHG | OXYGEN SATURATION: 99 % | BODY MASS INDEX: 24.92 KG/M2

## 2025-03-17 DIAGNOSIS — E21.0 PRIMARY HYPERPARATHYROIDISM: ICD-10-CM

## 2025-03-17 DIAGNOSIS — I10 ESSENTIAL HYPERTENSION: Primary | ICD-10-CM

## 2025-03-17 DIAGNOSIS — E05.20: ICD-10-CM

## 2025-03-17 DIAGNOSIS — E78.2 MIXED HYPERLIPIDEMIA: ICD-10-CM

## 2025-03-17 DIAGNOSIS — Z00.00 ANNUAL PHYSICAL EXAM: ICD-10-CM

## 2025-03-17 DIAGNOSIS — R79.9 ABNORMAL FINDING OF BLOOD CHEMISTRY, UNSPECIFIED: ICD-10-CM

## 2025-03-17 DIAGNOSIS — E53.8 B12 DEFICIENCY: ICD-10-CM

## 2025-03-17 DIAGNOSIS — L29.9 ITCHING: ICD-10-CM

## 2025-03-17 PROCEDURE — G2211 COMPLEX E/M VISIT ADD ON: HCPCS | Mod: S$GLB,,, | Performed by: FAMILY MEDICINE

## 2025-03-17 PROCEDURE — 99999 PR PBB SHADOW E&M-EST. PATIENT-LVL III: CPT | Mod: PBBFAC,,, | Performed by: FAMILY MEDICINE

## 2025-03-17 PROCEDURE — 99215 OFFICE O/P EST HI 40 MIN: CPT | Mod: S$GLB,,, | Performed by: FAMILY MEDICINE

## 2025-03-17 RX ORDER — HYDROXYZINE HYDROCHLORIDE 10 MG/1
10 TABLET, FILM COATED ORAL 3 TIMES DAILY PRN
Qty: 60 TABLET | Refills: 1 | Status: SHIPPED | OUTPATIENT
Start: 2025-03-17

## 2025-03-17 RX ORDER — VALSARTAN AND HYDROCHLOROTHIAZIDE 160; 12.5 MG/1; MG/1
1 TABLET, FILM COATED ORAL DAILY
Qty: 90 TABLET | Refills: 3 | Status: SHIPPED | OUTPATIENT
Start: 2025-03-17 | End: 2026-03-17

## 2025-03-17 NOTE — PROGRESS NOTES
"Patient ID: Eva Joseph is a 75 y.o. female.    Chief Complaint: Follow-up (6 month)    History of Present Illness    CHIEF COMPLAINT:  Eva presents today for follow up    CURRENT SYMPTOMS:  She reports experiencing night sweats that wake her from sleep, describing feeling sweaty and moist all over her body. She is uncertain of the cause. She reports blood pressure has been stable except during periods of stress when caring for others, during which systolic blood pressure increases to 130s.    WEIGHT MANAGEMENT:  She reports feeling heavier than usual at current weight of 140 lbs with height of 5'2". She notes weight-related knee discomfort, particularly during physical activities.    MEDICAL HISTORY:  She has a thyroid nodule that has been stable and under observation for approximately 10-20 years.    MEDICATIONS:  She takes Rosuvastatin at night for cholesterol management, Valsartan daily, and hydroxyzine (Atarax) occasionally at night as needed.    LABS:  CBC, B12, lipids, vitamin D, A1c, thyroid, parathyroid, and microalbumin ratio were normal in mid-October.      ROS:  ROS as indicated in HPI.         Physical Exam  Constitutional:       Appearance: Normal appearance.   HENT:      Head: Normocephalic and atraumatic.      Nose: Nose normal.   Eyes:      Extraocular Movements: Extraocular movements intact.      Pupils: Pupils are equal, round, and reactive to light.   Cardiovascular:      Rate and Rhythm: Normal rate and regular rhythm.      Pulses: Normal pulses.   Pulmonary:      Effort: Pulmonary effort is normal. No respiratory distress.      Breath sounds: Normal breath sounds. No wheezing or rhonchi.   Musculoskeletal:         General: Normal range of motion.   Skin:     General: Skin is warm and dry.   Neurological:      General: No focal deficit present.      Mental Status: She is alert and oriented to person, place, and time.   Psychiatric:         Mood and Affect: Mood normal.         " Behavior: Behavior normal.         Thought Content: Thought content normal.          Assessment & Plan    IMPRESSION:  - Reviewed recent lab results from mid-October, noting normal CBC, B12, CMP, lipids, vitamin D, A1c, thyroid, parathyroid, and microalbumin ratio.  - Ruled out thyroid issues based on recent normal lab results.  - Addressed concern about timing of Rosuvastatin, confirming nighttime administration is appropriate.  - Evaluated current medication regimen, including Fosamax, Irbesartan, hydroxyzine, Protonix, Rosuvastatin, and Valsartan.  - Noted existing thyroid nodule, which has been stable for years and is being monitored.    HYPERTENSION:  - Continued Valsartan, current prescription expires 3/20/24.  - Monitored the patient's blood pressure, which has been stable except when affected by her 's elevated blood pressure.  - Noted that the patient's blood pressure may increase to the 130s when stressed.  - Continued Irbesartan and Valsartan for blood pressure management.    HYPERLIPIDEMIA:  - Refilled Rosuvastatin.  - Reviewed mid-October lab results, which showed normal lipid levels.  - Continued Rosuvastatin to be taken at night for cholesterol management.  - Discussed the timing of Rosuvastatin intake, verifying correct nighttime administration.    THYROID NODULE:  - Monitored the patient's thyroid nodule, which has remained unchanged for 10-20 years.  - Reviewed mid-October lab results, which showed normal thyroid function.  - Noted the patient's scheduled appointment with an endocrinologist on 10/21/25 for ongoing monitoring of the thyroid nodule.  - Confirmed normal thyroid function based on recent lab results.    WEIGHT MANAGEMENT:  - Assessed the patient's weight, currently at 140 lbs, which the patient reports as heavier than usual.  - Calculated the patient's BMI as 24, which falls within the normal range.  - Discussed the patient's concern about weight impact on knees during pickleball  activities.    KNEE PAIN:  - Assessed the patient's right knee pain, potentially related to weight, experienced during pickleball activities.  - Assessed the patient's left knee pain, potentially related to weight, experienced during pickleball activities.    NIGHT SWEATS AND SLEEP ISSUES:  - Refilled hydroxyzine (Atarax) for occasional nighttime use.  - Evaluated the patient's complaint of waking up sweaty and moist during the night.    FOLLOW-UP:  - Ordered labs to be completed before next visit in 6 months.  - Follow up in 6 months.         Plan:          Essential hypertension    -     valsartan-hydrochlorothiazide (DIOVAN-HCT) 160-12.5 mg per tablet; Take 1 tablet by mouth once daily.  Dispense: 90 tablet; Refill: 3  -     Comprehensive Metabolic Panel; Future; Expected date: 09/17/2025  -     CBC Auto Differential; Future; Expected date: 09/17/2025  -     Microalbumin/Creatinine Ratio, Urine; Future; Expected date: 09/17/2025  -     TSH; Future; Expected date: 09/17/2025    Annual physical exam    -     Comprehensive Metabolic Panel; Future; Expected date: 09/17/2025  -     CBC Auto Differential; Future; Expected date: 09/17/2025  -     Hemoglobin A1C; Future; Expected date: 09/17/2025  -     Lipid Panel; Future; Expected date: 09/17/2025  -     Microalbumin/Creatinine Ratio, Urine; Future; Expected date: 09/17/2025  -     TSH; Future; Expected date: 09/17/2025  -     Vitamin B12; Future; Expected date: 09/17/2025  -     Vitamin D; Future; Expected date: 09/17/2025    Primary hyperparathyroidism  -     PTH, Intact; Future; Expected date: 09/17/2025    Mixed hyperlipidemia  -   -     Lipid Panel; Future; Expected date: 09/17/2025    B12 deficiency  -     Vitamin B12; Future; Expected date: 09/17/2025    Abnormal finding of blood chemistry, unspecified    -     Vitamin B12; Future; Expected date: 09/17/2025  -     Vitamin D; Future; Expected date: 09/17/2025    Hyperthyroidism with palpable thyroid nodule    -      TSH; Future; Expected date: 09/17/2025    Itching  -     hydrOXYzine HCL (ATARAX) 10 MG Tab; Take 1 tablet (10 mg total) by mouth 3 (three) times daily as needed.  Dispense: 60 tablet; Refill: 1        Follow up in about 6 months (around 9/17/2025), or if symptoms worsen or fail to improve.      In excessive of 40 minutes total time spent for evaluation and management services. Time included elements of the following: time spent preparing to see patient, obtaining and reviewing separately obtained history, exam, evaluation, counseling and education of patient/family member or care giver, documenting in the HMR, independently interpreting results and communication of results, coordination of care ordering medications, tests, or procedures, referral and communication to other health care professionals.    This note was generated with the assistance of ambient listening technology. Verbal consent was obtained by the patient and accompanying visitor(s) for the recording of patient appointment to facilitate this note. I attest to having reviewed and edited the generated note for accuracy, though some syntax or spelling errors may persist. Please contact the author of this note for any clarification.

## 2025-04-11 ENCOUNTER — PATIENT MESSAGE (OUTPATIENT)
Dept: CARDIOLOGY | Facility: CLINIC | Age: 75
End: 2025-04-11
Payer: MEDICARE

## 2025-06-09 ENCOUNTER — OFFICE VISIT (OUTPATIENT)
Dept: DERMATOLOGY | Facility: CLINIC | Age: 75
End: 2025-06-09
Payer: MEDICARE

## 2025-06-09 VITALS — WEIGHT: 140.63 LBS | BODY MASS INDEX: 24.92 KG/M2

## 2025-06-09 DIAGNOSIS — D22.9 MULTIPLE BENIGN NEVI: Primary | ICD-10-CM

## 2025-06-09 DIAGNOSIS — L65.0 TELOGEN EFFLUVIUM: ICD-10-CM

## 2025-06-09 DIAGNOSIS — L81.4 SOLAR LENTIGO: ICD-10-CM

## 2025-06-09 DIAGNOSIS — L82.0 INFLAMED SEBORRHEIC KERATOSIS: ICD-10-CM

## 2025-06-09 DIAGNOSIS — L65.8 FEMALE PATTERN ALOPECIA: ICD-10-CM

## 2025-06-09 DIAGNOSIS — B07.8 COMMON WART: ICD-10-CM

## 2025-06-09 DIAGNOSIS — L82.1 SEBORRHEIC KERATOSES: ICD-10-CM

## 2025-06-09 DIAGNOSIS — L73.8 SEBACEOUS HYPERPLASIA OF FACE: ICD-10-CM

## 2025-06-09 PROCEDURE — 17110 DESTRUCTION B9 LES UP TO 14: CPT | Mod: S$GLB,,, | Performed by: DERMATOLOGY

## 2025-06-09 PROCEDURE — 99213 OFFICE O/P EST LOW 20 MIN: CPT | Mod: 25,S$GLB,, | Performed by: DERMATOLOGY

## 2025-06-09 RX ORDER — MINOXIDIL 2.5 MG/1
TABLET ORAL
Qty: 45 TABLET | Refills: 3 | Status: SHIPPED | OUTPATIENT
Start: 2025-06-09

## 2025-06-09 NOTE — PATIENT INSTRUCTIONS

## 2025-06-09 NOTE — PROGRESS NOTES
Subjective:      Patient ID:  Eva Joseph is a 75 y.o. female who presents for   Chief Complaint   Patient presents with    Skin Check     TBSC     LOV 07/30/2024    Patient is coming in for TBSC.  C/o of spot on right index finger- tried OTC compound W  C/o of spot on right arm    Derm Hx:  Denies Phx of NMSC  Denies Fhx of MM      Current Outpatient Medications:   ·  alendronate (FOSAMAX) 70 MG tablet, TAKE 1 TABLET BY MOUTH ONCE A WEEK EVERY  7  DAYS, Disp: 12 tablet, Rfl: 3  ·  aspirin (ECOTRIN) 81 MG EC tablet, Take 1 tablet (81 mg total) by mouth once daily., Disp: , Rfl: 0  ·  coenzyme Q10-vitamin E 100-100 mg-unit Cap, Take by mouth Daily. 1 Capsule Oral Every day, Disp: , Rfl:   ·  fish oil-dha-epa 1,200-144-216 mg Cap, Take by mouth Daily. 1 Capsule Oral Every day, Disp: , Rfl:   ·  hydrOXYzine HCL (ATARAX) 10 MG Tab, Take 1 tablet (10 mg total) by mouth 3 (three) times daily as needed., Disp: 60 tablet, Rfl: 1  ·  minoxidiL (LONITEN) 2.5 MG tablet, Take 1/2 tablet PO QHS, Disp: 45 tablet, Rfl: 3  ·  multivitamin (THERAGRAN) per tablet, Take by mouth once daily. 1 Tablet Oral Every day, Disp: , Rfl:   ·  pantoprazole (PROTONIX) 40 MG tablet, Take 1 tablet (40 mg total) by mouth once daily., Disp: 90 tablet, Rfl: 3  ·  rosuvastatin (CRESTOR) 5 MG tablet, Take 1 tablet by mouth once daily, Disp: 90 tablet, Rfl: 1  ·  valsartan-hydrochlorothiazide (DIOVAN-HCT) 160-12.5 mg per tablet, Take 1 tablet by mouth once daily., Disp: 90 tablet, Rfl: 3       Review of Systems   Constitutional:  Negative for fever, chills and fatigue. Weight gain: covid.  HENT:  Negative for headaches.    Respiratory:  Negative for cough and shortness of breath.    Skin:  Positive for daily sunscreen use, activity-related sunscreen use and wears hat. Negative for itching, rash and dry skin.   Neurological:  Negative for headaches.   Hematologic/Lymphatic: Bruises/bleeds easily.       Objective:   Physical Exam   Constitutional:  She appears well-developed and well-nourished. No distress.   HENT:   Mouth/Throat: Lips normal.    Eyes: Lids are normal.    Neurological: She is alert and oriented to person, place, and time. She is not disoriented.   Psychiatric: She has a normal mood and affect. She is not agitated.   Skin:   Areas Examined (abnormalities noted in diagram):   Scalp / Hair Palpated and Inspected  Head / Face Inspection Performed  Neck Inspection Performed  Chest / Axilla Inspection Performed  Abdomen Inspection Performed  Genitals / Buttocks / Groin Inspection Performed  Back Inspection Performed  RUE Inspected  LUE Inspection Performed  RLE Inspected  LLE Inspection Performed  Nails and Digits Inspection Performed                     Diagram Legend     Erythematous scaling macule/papule c/w actinic keratosis       Vascular papule c/w angioma      Pigmented verrucoid papule/plaque c/w seborrheic keratosis      Yellow umbilicated papule c/w sebaceous hyperplasia      Irregularly shaped tan macule c/w lentigo     1-2 mm smooth white papules consistent with Milia      Movable subcutaneous cyst with punctum c/w epidermal inclusion cyst      Subcutaneous movable cyst c/w pilar cyst      Firm pink to brown papule c/w dermatofibroma      Pedunculated fleshy papule(s) c/w skin tag(s)      Evenly pigmented macule c/w junctional nevus     Mildly variegated pigmented, slightly irregular-bordered macule c/w mildly atypical nevus      Flesh colored to evenly pigmented papule c/w intradermal nevus       Pink pearly papule/plaque c/w basal cell carcinoma      Erythematous hyperkeratotic cursted plaque c/w SCC      Surgical scar with no sign of skin cancer recurrence      Open and closed comedones      Inflammatory papules and pustules      Verrucoid papule consistent consistent with wart     Erythematous eczematous patches and plaques     Dystrophic onycholytic nail with subungual debris c/w onychomycosis     Umbilicated papule     Erythematous-base heme-crusted tan verrucoid plaque consistent with inflamed seborrheic keratosis     Erythematous Silvery Scaling Plaque c/w Psoriasis     See annotation      Assessment / Plan:      ISK R post shoulder, itchy, requests tx  Cryosurgery procedure note:    Verbal consent from the patient is obtained. Liquid nitrogen cryosurgery is applied to 1 lesions to produce a freeze injury. The patient is aware that blisters may form and is instructed on wound care with gentle cleansing and use of vaseline ointment to keep moist until healed. The patient is supplied a handout on cryosurgery and is instructed to call if lesions do not completely resolve.    Multiple benign nevi  Careful dermoscopy evaluation of nevi performed with none identified as needing biopsy today  Monitor for new mole or moles that are becoming bigger, darker, irritated, or developing irregular borders.     Telogen effluvium  -     minoxidiL (LONITEN) 2.5 MG tablet; Take 1/2 tablet PO QHS  Dispense: 45 tablet; Refill: 3  Stabilized, tolerates low dose oral minoxidil, continue      Seborrheic keratoses  These are benign inherited growths without a malignant potential. Reassurance given to patient. No treatment is necessary.     Sebaceous hyperplasia of face  These are benign inherited growths without a malignant potential. Reassurance given to patient. No treatment is necessary.     Solar lentigo  This is a benign hyperpigmented sun induced lesion. Daily sun protection will reduce the number of new lesions. Treatment of these benign lesions are considered cosmetic.    Common wart  Cryosurgery procedure note:    Verbal consent from the patient is obtained. Liquid nitrogen cryosurgery is applied to 1 verruca without prior paring, as detailed in the physical exam, to produce a freeze injury. 3 consecutive freeze thaw cycles are applied to each verruca. The patient is aware that blisters (possibly blood blisters) may form.    Female pattern  alopecia             Follow up in about 1 year (around 6/9/2026), or if symptoms worsen or fail to improve.

## 2025-06-23 ENCOUNTER — TELEPHONE (OUTPATIENT)
Dept: CARDIOLOGY | Facility: CLINIC | Age: 75
End: 2025-06-23
Payer: MEDICARE

## 2025-06-25 ENCOUNTER — OFFICE VISIT (OUTPATIENT)
Dept: CARDIOLOGY | Facility: CLINIC | Age: 75
End: 2025-06-25
Payer: MEDICARE

## 2025-06-25 VITALS
HEIGHT: 63 IN | DIASTOLIC BLOOD PRESSURE: 78 MMHG | SYSTOLIC BLOOD PRESSURE: 127 MMHG | BODY MASS INDEX: 24.61 KG/M2 | OXYGEN SATURATION: 97 % | HEART RATE: 51 BPM | WEIGHT: 138.88 LBS

## 2025-06-25 DIAGNOSIS — R93.1 AGATSTON CORONARY ARTERY CALCIUM SCORE BETWEEN 100 AND 199: Primary | ICD-10-CM

## 2025-06-25 DIAGNOSIS — I10 ESSENTIAL HYPERTENSION: ICD-10-CM

## 2025-06-25 DIAGNOSIS — E78.1 HYPERTRIGLYCERIDEMIA: ICD-10-CM

## 2025-06-25 DIAGNOSIS — I70.0 AORTIC ATHEROSCLEROSIS: ICD-10-CM

## 2025-06-25 PROBLEM — R03.0 ELEVATED BLOOD PRESSURE READING: Status: RESOLVED | Noted: 2022-01-03 | Resolved: 2025-06-25

## 2025-06-25 PROCEDURE — 99999 PR PBB SHADOW E&M-EST. PATIENT-LVL IV: CPT | Mod: PBBFAC,,, | Performed by: PHYSICIAN ASSISTANT

## 2025-06-25 PROCEDURE — 99214 OFFICE O/P EST MOD 30 MIN: CPT | Mod: S$GLB,,, | Performed by: PHYSICIAN ASSISTANT

## 2025-06-25 NOTE — PROGRESS NOTES
General Cardiology Clinic Note  Reason for Visit: Annual  Last Clinic Visit: 4/28/2024  General Cardiologist: Dr. Gomez    HPI:   Eva Joseph is a 75 y.o. female who presents for annual.     Problems:  Coronary artery disease by calcification (CACS 155)  Aortic atherosclerosis   Family history of atherosclerosis   Hypertension   Hyperlipidemia   Breast cancer (no chemo, radiation to right side)    Interval HPI   Patient presents for annual. Doing well. Sometimes feels like her feet are swollen, but no actual pitting edema. Will also get some swelling in her fingers, so maybe just sodium intake. She has some generalized fatigue, but also reports not sleeping very well at night. Otherwise, doing great. She is staying very active. She plays pickle ball twice a week, does barre twice a week, and does line dancing 1-2 times a week. Patient denies chest pain, HARRIS, orthopnea, PND, pedal edema, sudden weight gain, syncope, near syncope, lightheadedness, palpitations, TIA symptoms. BP at home is well controlled (110s-120s systolic).     4/28/2024 HPI (Dr. Gomez)  We saw her once in 2023 with fellow, doing well at that time, in 2022 when we saw her we swapped out losartan for valsartan/hydrochlorothiazide and blood pressure has been doing better on this. Blood pressure today well-controlled 120/70. She did have an episode recently where she developed right shoulder pain, and a pressure across her chest, happening after a meal, and came in for workup in based [place], had EKG, and other subsequent testing ultimately was found to have some gallstones evaluated by surgery, and no indication for surgery, has not had any recent episodes symptoms have been improving. She had a CTA which also ruled out dissection and showed known aortic atherosclerosis. She continues to be very physically active playing pick[employer] Technologiesball, and denies any dyspnea on exertion, chest discomfort, chest tightness, or chest pain with these more  strenuous activities. Most recent LDL checked last month 65 primary prevention well-controlled on statin. Has not required any sublingual nitroglycerin, and tolerating aspirin well.     ROS:      Review of Systems   Constitutional: Negative for diaphoresis, malaise/fatigue, weight gain and weight loss.   HENT:  Negative for nosebleeds.    Eyes:  Negative for vision loss in left eye, vision loss in right eye and visual disturbance.   Cardiovascular:  Negative for chest pain, claudication, dyspnea on exertion, irregular heartbeat, leg swelling, near-syncope, orthopnea, palpitations, paroxysmal nocturnal dyspnea and syncope.   Respiratory:  Negative for cough, shortness of breath, sleep disturbances due to breathing, snoring and wheezing.    Hematologic/Lymphatic: Negative for bleeding problem. Does not bruise/bleed easily.   Skin:  Negative for poor wound healing and rash.   Musculoskeletal:  Negative for muscle cramps and myalgias.   Gastrointestinal:  Negative for bloating, abdominal pain, diarrhea, heartburn, melena, nausea and vomiting.   Genitourinary:  Negative for hematuria and nocturia.   Neurological:  Negative for brief paralysis, dizziness, headaches, light-headedness, numbness and weakness.   Psychiatric/Behavioral:  Negative for depression.    Allergic/Immunologic: Negative for hives.       PMH:     Past Medical History:   Diagnosis Date    Breast cancer 2000    also in 2013    Depression     Essential hypertension 5/18/2022    Hypertriglyceridemia 7/21/2019    Nephrolithiasis 2/5/2015    Osteopenia      Past Surgical History:   Procedure Laterality Date    ADENOIDECTOMY      BILATERAL OOPHORECTOMY      benign    BREAST LUMPECTOMY  2000    Right breast    BREAST RECONSTRUCTION Bilateral 2013    CATARACT EXTRACTION, BILATERAL      CHOLECYSTECTOMY  2024    EYE SURGERY  2022    Catract    Femoral hernia  2010    right side repair    HEMORRHOID SURGERY      HERNIA REPAIR  2002    HYSTERECTOMY  1998    KELSEY,  fibroid    LAPAROSCOPIC CHOLECYSTECTOMY N/A 06/13/2024    Procedure: CHOLECYSTECTOMY, LAPAROSCOPIC;  Surgeon: Shaheen Salinas Jr., MD;  Location: [facility];  Service: General;  Laterality: N/A;    MASTECTOMY  2013    bilateral     OOPHORECTOMY      TONSILLECTOMY       Allergies:     Review of patient's allergies indicates:   Allergen Reactions    Sulfa (sulfonamide antibiotics) Other (See Comments)     Other reaction(s): Swelling     Medications:   Medications Ordered Prior to Encounter[1]  Social History:     Social History     Tobacco Use    Smoking status: Never    Smokeless tobacco: Never   Substance Use Topics    Alcohol use: Yes     Alcohol/week: 2.0 standard drinks of alcohol     Types: 2 Glasses of wine per week     Comment: Occasionally     Family History:     Family History   Problem Relation Name Age of Onset    Breast cancer Mother Eva Greer         Breast cancer-75yrs    Cancer Mother Eva Greer     Hypertension Father Nasir     Heart disease Father Nasir 56        MI    Cancer Maternal Aunt Sharee         ovarian    Ovarian cancer Maternal Aunt Sharee     Cancer Maternal Grandfather Eva Calzada Bautista         testicular / prostate    Stroke Paternal Grandmother Grandmother     Breast cancer Maternal Grandmother          Breast cancer-42yrs    Parkinsonism Paternal Grandfather      No Known Problems Sister      Skin cancer Brother      Hyperlipidemia Son Juan         As a child    No Known Problems Daughter      No Known Problems Daughter      No Known Problems Sister      No Known Problems Brother      Amblyopia Neg Hx      Blindness Neg Hx      Cataracts Neg Hx      Diabetes Neg Hx      Glaucoma Neg Hx      Macular degeneration Neg Hx      Retinal detachment Neg Hx      Strabismus Neg Hx      Thyroid disease Neg Hx      Colon cancer Neg Hx      Melanoma Neg Hx       Physical Exam:   /78 (BP Location: Left arm, Patient Position: Sitting)   Pulse (!) 51   Ht  "5' 3" (1.6 m)   Wt 63 kg (138 lb 14.2 oz)   LMP  (LMP Unknown)   SpO2 97%   BMI 24.60 kg/m²        Physical Exam  Vitals and nursing note reviewed.   Constitutional:       General: She is not in acute distress.     Appearance: Normal appearance. She is not ill-appearing.   HENT:      Head: Normocephalic and atraumatic.   Eyes:      General: No scleral icterus.     Conjunctiva/sclera: Conjunctivae normal.   Neck:      Thyroid: No thyromegaly.      Vascular: No carotid bruit or JVD.   Cardiovascular:      Rate and Rhythm: Normal rate and regular rhythm.      Pulses: Normal pulses.      Heart sounds: Normal heart sounds. No murmur heard.     No friction rub. No gallop.   Pulmonary:      Effort: Pulmonary effort is normal.      Breath sounds: Normal breath sounds. No wheezing, rhonchi or rales.   Chest:      Chest wall: No tenderness.   Abdominal:      General: Bowel sounds are normal. There is no distension.      Palpations: Abdomen is soft.      Tenderness: There is no abdominal tenderness.   Musculoskeletal:         General: No swelling.      Cervical back: Neck supple.      Right lower leg: No edema.      Left lower leg: No edema.   Skin:     General: Skin is warm and dry.      Coloration: Skin is not pale.      Findings: No erythema or rash.      Nails: There is no clubbing.   Neurological:      Mental Status: She is alert and oriented to person, place, and time. Mental status is at baseline.   Psychiatric:         Mood and Affect: Mood and affect normal.         Behavior: Behavior normal.          Labs:     Lab Results   Component Value Date     10/16/2024     10/16/2024    K 3.8 10/16/2024    K 3.8 10/16/2024     10/16/2024     10/16/2024    CO2 26 10/16/2024    CO2 26 10/16/2024    BUN 19 10/16/2024    BUN 19 10/16/2024    CREATININE 0.7 10/16/2024    CREATININE 0.7 10/16/2024    ANIONGAP 10 10/16/2024    ANIONGAP 10 10/16/2024     Lab Results   Component Value Date    HGBA1C 5.2 " 10/16/2024     Lab Results   Component Value Date    BNP 52 04/06/2024    Lab Results   Component Value Date    WBC 5.49 10/16/2024    HGB 14.2 10/16/2024    HCT 45.4 10/16/2024     10/16/2024    GRAN 3.1 10/16/2024    GRAN 56.6 10/16/2024     Lab Results   Component Value Date    CHOL 142 10/16/2024    HDL 56 10/16/2024    LDLCALC 69.4 10/16/2024    LDLCALC 122 (H) 01/13/2017    TRIG 83 10/16/2024          Imaging:   Echocardiograms:   TTE 1/11/2022  The left ventricle is normal in size with concentric hypertrophy and normal systolic function.  The estimated ejection fraction is 62%.  Normal left ventricular diastolic function.  The left ventricular global longitudinal strain is -17%. Borderline.  Normal right ventricular size with normal right ventricular systolic function.  Normal central venous pressure (3 mmHg).  The estimated PA systolic pressure is 19 mmHg.  Mild left atrial enlargement.    Stress Tests:   None    Caths:   None    Other:  CT Coronary calcium score 1/11/2022  Your total calcium score is 155, consistent with moderate to high risk of coronary artery disease.     Incidental sclerotic lesion left rib please see comments above.      EKG 5/4/2024: Sinus bradycardia, otherwise normal     Assessment:     1. Agatston coronary artery calcium score between 100 and 199, 155 in 2022    2. Essential hypertension    3. Aortic atherosclerosis    4. Hypertriglyceridemia      Plan:     CAD (CACS 155)  Denies angina  Continue ASA, statin   Continue exercise    Hypertension  Well controlled on Valsartan-HCTZ 160-12.5 mg daily     Hyperlipidemia  Aortic atherosclerosis   LDL at goal on Rosuvastatin 5 mg daily       Follow up in one year.     Signed:  Iesha Rodriguez PA-C  Cardiology   [phone] - General         [1]   Current Outpatient Medications on File Prior to Visit   Medication Sig Dispense Refill    alendronate (FOSAMAX) 70 MG tablet TAKE 1 TABLET BY MOUTH ONCE A WEEK EVERY  7  DAYS 12 tablet 3     aspirin (ECOTRIN) 81 MG EC tablet Take 1 tablet (81 mg total) by mouth once daily.  0    coenzyme Q10-vitamin E 100-100 mg-unit Cap Take by mouth Daily. 1 Capsule Oral Every day      fish oil-dha-epa 1,200-144-216 mg Cap Take by mouth Daily. 1 Capsule Oral Every day      hydrOXYzine HCL (ATARAX) 10 MG Tab Take 1 tablet (10 mg total) by mouth 3 (three) times daily as needed. 60 tablet 1    minoxidiL (LONITEN) 2.5 MG tablet Take 1/2 tablet PO QHS 45 tablet 3    multivitamin (THERAGRAN) per tablet Take by mouth once daily. 1 Tablet Oral Every day      pantoprazole (PROTONIX) 40 MG tablet Take 1 tablet (40 mg total) by mouth once daily. 90 tablet 3    rosuvastatin (CRESTOR) 5 MG tablet Take 1 tablet by mouth once daily 90 tablet 1    valsartan-hydrochlorothiazide (DIOVAN-HCT) 160-12.5 mg per tablet Take 1 tablet by mouth once daily. 90 tablet 3     No current facility-administered medications on file prior to visit.

## 2025-06-30 ENCOUNTER — OFFICE VISIT (OUTPATIENT)
Dept: PODIATRY | Facility: CLINIC | Age: 75
End: 2025-06-30
Payer: MEDICARE

## 2025-06-30 VITALS
HEIGHT: 63 IN | BODY MASS INDEX: 24.61 KG/M2 | HEART RATE: 55 BPM | WEIGHT: 138.88 LBS | SYSTOLIC BLOOD PRESSURE: 122 MMHG | DIASTOLIC BLOOD PRESSURE: 72 MMHG

## 2025-06-30 DIAGNOSIS — M72.2 PLANTAR FASCIITIS: Primary | ICD-10-CM

## 2025-06-30 PROCEDURE — 99203 OFFICE O/P NEW LOW 30 MIN: CPT | Mod: S$GLB,,, | Performed by: PODIATRIST

## 2025-06-30 PROCEDURE — 99999 PR PBB SHADOW E&M-EST. PATIENT-LVL IV: CPT | Mod: PBBFAC,,, | Performed by: PODIATRIST

## 2025-07-01 PROBLEM — M72.2 PLANTAR FASCIITIS: Status: ACTIVE | Noted: 2025-07-01

## 2025-07-01 NOTE — PROGRESS NOTES
Subjective:       Patient ID: Eva Joseph is a 75 y.o. female.    Chief Complaint: Heel Pain  Patient presents today with a complaint of left heel pain x2 weeks she is also having pain in the big toe right foot x2 months.    Past Medical History:   Diagnosis Date    Breast cancer 2000    also in 2013    Depression     Essential hypertension 5/18/2022    Hypertriglyceridemia 7/21/2019    Nephrolithiasis 2/5/2015    Osteopenia      Past Surgical History:   Procedure Laterality Date    ADENOIDECTOMY      BILATERAL OOPHORECTOMY      benign    BREAST LUMPECTOMY  2000    Right breast    BREAST RECONSTRUCTION Bilateral 2013    CATARACT EXTRACTION, BILATERAL      CHOLECYSTECTOMY  2024    EYE SURGERY  2022    Catract    Femoral hernia  2010    right side repair    HEMORRHOID SURGERY      HERNIA REPAIR  2002    HYSTERECTOMY  1998    KELSEY, fibroid    LAPAROSCOPIC CHOLECYSTECTOMY N/A 06/13/2024    Procedure: CHOLECYSTECTOMY, LAPAROSCOPIC;  Surgeon: Shaheen Salinas Jr., MD;  Location: [facility];  Service: General;  Laterality: N/A;    MASTECTOMY  2013    bilateral     OOPHORECTOMY      TONSILLECTOMY       Family History   Problem Relation Name Age of Onset    Breast cancer Mother Eva Greer         Breast cancer-75yrs    Cancer Mother Eva Greer     Hypertension Father Nasir     Heart disease Father Nasir 56        MI    Cancer Maternal Aunt Sharee         ovarian    Ovarian cancer Maternal Aunt Sharee     Cancer Maternal Grandfather Evasharon Bautista         testicular / prostate    Stroke Paternal Grandmother Grandmother     Breast cancer Maternal Grandmother          Breast cancer-42yrs    Parkinsonism Paternal Grandfather      No Known Problems Sister      Skin cancer Brother      Hyperlipidemia Son Juan         As a child    No Known Problems Daughter      No Known Problems Daughter      No Known Problems Sister      No Known Problems Brother      Amblyopia Neg Hx      Blindness Neg  Hx      Cataracts Neg Hx      Diabetes Neg Hx      Glaucoma Neg Hx      Macular degeneration Neg Hx      Retinal detachment Neg Hx      Strabismus Neg Hx      Thyroid disease Neg Hx      Colon cancer Neg Hx      Melanoma Neg Hx       Social History     Socioeconomic History    Marital status:    Tobacco Use    Smoking status: Never    Smokeless tobacco: Never   Substance and Sexual Activity    Alcohol use: Yes     Alcohol/week: 2.0 standard drinks of alcohol     Types: 2 Glasses of wine per week     Comment: Occasionally    Drug use: No    Sexual activity: Yes     Partners: Male     Birth control/protection: Post-menopausal     Comment:      Social Drivers of Health     Financial Resource Strain: Low Risk  (4/24/2024)    Overall Financial Resource Strain (CARDIA)     Difficulty of Paying Living Expenses: Not hard at all   Food Insecurity: No Food Insecurity (4/24/2024)    Hunger Vital Sign     Worried About Running Out of Food in the Last Year: Never true     Ran Out of Food in the Last Year: Never true   Transportation Needs: No Transportation Needs (4/24/2024)    PRAPARE - Transportation     Lack of Transportation (Medical): No     Lack of Transportation (Non-Medical): No   Physical Activity: Sufficiently Active (4/24/2024)    Exercise Vital Sign     Days of Exercise per Week: 4 days     Minutes of Exercise per Session: 40 min   Stress: Stress Concern Present (4/24/2024)    [place] citizen [place] [employer] - Occupational Stress Questionnaire     Feeling of Stress : Very much   Housing Stability: Unknown (4/24/2024)    Housing Stability Vital Sign     Unable to Pay for Housing in the Last Year: No       Current Medications[1]  Review of patient's allergies indicates:   Allergen Reactions    Sulfa (sulfonamide antibiotics) Other (See Comments)     Other reaction(s): Swelling       Review of Systems   Musculoskeletal:  Positive for arthralgias.   All other systems reviewed and are negative.     "  Objective:      Vitals:    06/30/25 1112   BP: 122/72   Pulse: (!) 55   Weight: 63 kg (138 lb 14.2 oz)   Height: 5' 3" (1.6 m)     Physical Exam  Vitals and nursing note reviewed.   Constitutional:       Appearance: Normal appearance.   Cardiovascular:      Pulses:           Dorsalis pedis pulses are 2+ on the right side and 2+ on the left side.        Posterior tibial pulses are 2+ on the right side and 2+ on the left side.   Pulmonary:      Effort: Pulmonary effort is normal.   Musculoskeletal:         General: Swelling and tenderness present.      Right foot: Decreased range of motion.        Feet:    Feet:      Right foot:      Protective Sensation: 2 sites tested.  2 sites sensed.      Skin integrity: Erythema present.      Left foot:      Protective Sensation: 2 sites tested.  2 sites sensed.      Skin integrity: Erythema present.   Skin:     Capillary Refill: Capillary refill takes less than 2 seconds.      Findings: Erythema present.   Neurological:      General: No focal deficit present.      Mental Status: She is alert.   Psychiatric:         Mood and Affect: Mood normal.         Behavior: Behavior normal.                        Assessment:       1. Plantar fasciitis        Plan:       Patient presents today with a complaint of left heel pain x2 weeks she is also having pain in the big toe right foot x2 months.  A comprehensive new patient evaluation was performed today in discussion with the patient she states that around the time that this started she did play 6 pickleball games in a row she states she normally does not do that she typically plays 3 or 4 and has rest in between she states it was after that that left heel started to bother her she does relate having pain on 1st steps out of bed in the morning.  Patient is also having some tightness or contracture at the end of the right great toe that is been going on for 2 months this typically occurs at night.  Patient states she does wear good tennis " shoes she wears a lower tennis shoe to play pickleball but she does have running shoes which she does not wear when she plays pickleball because she does not feel as stable.  On evaluation the patient has no skin breaks no active signs of infection she does have inflammation on the plantar aspect of the patient's left heel at the plantar fascial insertion these findings are consistent with plantar fasciitis she does have normal appearing arches bilateral and I explained to the patient how excessive tightness pull and even some interstitial tearing can occur of the plantar fascia where it attaches to the heel bone this causes inflammation and it causes pain.  I did advised the patient the pain she is having related to her plantar fascia is an indication that she is not getting enough support it is likely that the 6 games of pickleball caused over stretching of the plantar fascia and because she does not have enough support in her shoes it has not been able to completely resolve or settled down and has remained inflamed.  I did dispense the patient some blue arch pads which I want her to use at all times I put these in the patient's shoes I gave her additional pads she needs to make sure she is wearing these at all times I have also recommended that she look into purchasing some Oofos that she can wear in the house that have good support are soft and cushioned and will help with the plantar fasciitis.  Patient appears to be having some contractures likely related to arthritic changes at the distal interphalangeal joint right hallux I have recommended the application of Voltaren gel in the morning and definitely in the evening before bed to help eliminate this this may also be helped with the addition of the support to the patient's shoes.  I do plan to follow up with the patient in about a month we will see how she is doing we will see how she tolerates the blue pads and we can look at something more aggressive  regarding support depending upon how she responds to the blue pads.  Patient advised to contact us with any problems questions or concerns.This note was created using [employer] voice recognition software that occasionally misinterpreted phrases or words.        [1]   Current Outpatient Medications   Medication Sig Dispense Refill    alendronate (FOSAMAX) 70 MG tablet TAKE 1 TABLET BY MOUTH ONCE A WEEK EVERY  7  DAYS 12 tablet 3    coenzyme Q10-vitamin E 100-100 mg-unit Cap Take by mouth Daily. 1 Capsule Oral Every day      fish oil-dha-epa 1,200-144-216 mg Cap Take by mouth Daily. 1 Capsule Oral Every day      hydrOXYzine HCL (ATARAX) 10 MG Tab Take 1 tablet (10 mg total) by mouth 3 (three) times daily as needed. 60 tablet 1    minoxidiL (LONITEN) 2.5 MG tablet Take 1/2 tablet PO QHS 45 tablet 3    multivitamin (THERAGRAN) per tablet Take by mouth once daily. 1 Tablet Oral Every day      pantoprazole (PROTONIX) 40 MG tablet Take 1 tablet (40 mg total) by mouth once daily. 90 tablet 3    rosuvastatin (CRESTOR) 5 MG tablet Take 1 tablet by mouth once daily 90 tablet 1    valsartan-hydrochlorothiazide (DIOVAN-HCT) 160-12.5 mg per tablet Take 1 tablet by mouth once daily. 90 tablet 3    aspirin (ECOTRIN) 81 MG EC tablet Take 1 tablet (81 mg total) by mouth once daily.  0     No current facility-administered medications for this visit.

## 2025-07-30 ENCOUNTER — OFFICE VISIT (OUTPATIENT)
Dept: PODIATRY | Facility: CLINIC | Age: 75
End: 2025-07-30
Payer: MEDICARE

## 2025-07-30 VITALS
HEIGHT: 63 IN | BODY MASS INDEX: 24.61 KG/M2 | WEIGHT: 138.88 LBS | SYSTOLIC BLOOD PRESSURE: 134 MMHG | HEART RATE: 60 BPM | DIASTOLIC BLOOD PRESSURE: 76 MMHG

## 2025-07-30 DIAGNOSIS — M72.2 PLANTAR FASCIITIS: Primary | ICD-10-CM

## 2025-07-30 PROCEDURE — 99213 OFFICE O/P EST LOW 20 MIN: CPT | Mod: S$GLB,,, | Performed by: PODIATRIST

## 2025-07-30 PROCEDURE — 99999 PR PBB SHADOW E&M-EST. PATIENT-LVL III: CPT | Mod: PBBFAC,,, | Performed by: PODIATRIST

## 2025-07-31 NOTE — PROGRESS NOTES
Subjective:       Patient ID: Eva Joseph is a 75 y.o. female.    Chief Complaint: Plantar Fasciitis  Patient presents today for follow-up plantar fasciitis left.    Past Medical History:   Diagnosis Date    Breast cancer 2000    also in 2013    Depression     Essential hypertension 5/18/2022    Hypertriglyceridemia 7/21/2019    Nephrolithiasis 2/5/2015    Osteopenia      Past Surgical History:   Procedure Laterality Date    ADENOIDECTOMY      BILATERAL OOPHORECTOMY      benign    BREAST LUMPECTOMY  2000    Right breast    BREAST RECONSTRUCTION Bilateral 2013    CATARACT EXTRACTION, BILATERAL      CHOLECYSTECTOMY  2024    EYE SURGERY  2022    Catract    Femoral hernia  2010    right side repair    HEMORRHOID SURGERY      HERNIA REPAIR  2002    HYSTERECTOMY  1998    KELSEY, fibroid    LAPAROSCOPIC CHOLECYSTECTOMY N/A 06/13/2024    Procedure: CHOLECYSTECTOMY, LAPAROSCOPIC;  Surgeon: Shaheen Salinas Jr., MD;  Location: [facility];  Service: General;  Laterality: N/A;    MASTECTOMY  2013    bilateral     OOPHORECTOMY      TONSILLECTOMY       Family History   Problem Relation Name Age of Onset    Breast cancer Mother Eva Greer         Breast cancer-75yrs    Cancer Mother Eva Greer     Hypertension Father Nasir     Heart disease Father Nasir 56        MI    Cancer Maternal Aunt Sharee         ovarian    Ovarian cancer Maternal Aunt Sharee     Cancer Maternal Grandfather Eva Bautista         testicular / prostate    Stroke Paternal Grandmother Grandmother     Breast cancer Maternal Grandmother          Breast cancer-42yrs    Parkinsonism Paternal Grandfather      No Known Problems Sister      Skin cancer Brother      Hyperlipidemia Son Juan         As a child    No Known Problems Daughter      No Known Problems Daughter      No Known Problems Sister      No Known Problems Brother      Amblyopia Neg Hx      Blindness Neg Hx      Cataracts Neg Hx      Diabetes Neg Hx      Glaucoma  Neg Hx      Macular degeneration Neg Hx      Retinal detachment Neg Hx      Strabismus Neg Hx      Thyroid disease Neg Hx      Colon cancer Neg Hx      Melanoma Neg Hx       Social History     Socioeconomic History    Marital status:    Tobacco Use    Smoking status: Never    Smokeless tobacco: Never   Substance and Sexual Activity    Alcohol use: Yes     Alcohol/week: 2.0 standard drinks of alcohol     Types: 2 Glasses of wine per week     Comment: Occasionally    Drug use: No    Sexual activity: Yes     Partners: Male     Birth control/protection: Post-menopausal     Comment:      Social Drivers of Health     Financial Resource Strain: Low Risk  (4/24/2024)    Overall Financial Resource Strain (CARDIA)     Difficulty of Paying Living Expenses: Not hard at all   Food Insecurity: No Food Insecurity (4/24/2024)    Hunger Vital Sign     Worried About Running Out of Food in the Last Year: Never true     Ran Out of Food in the Last Year: Never true   Transportation Needs: No Transportation Needs (4/24/2024)    PRAPARE - Transportation     Lack of Transportation (Medical): No     Lack of Transportation (Non-Medical): No   Physical Activity: Sufficiently Active (4/24/2024)    Exercise Vital Sign     Days of Exercise per Week: 4 days     Minutes of Exercise per Session: 40 min   Stress: Stress Concern Present (4/24/2024)    [employer] - Occupational Stress Questionnaire     Feeling of Stress : Very much   Housing Stability: Unknown (4/24/2024)    Housing Stability Vital Sign     Unable to Pay for Housing in the Last Year: No       Current Medications[1]  Review of patient's allergies indicates:   Allergen Reactions    Sulfa (sulfonamide antibiotics) Other (See Comments)     Other reaction(s): Swelling       Review of Systems   Musculoskeletal:  Positive for arthralgias.   All other systems reviewed and are negative.      Objective:      Vitals:    07/30/25 0855   BP: 134/76  "  Pulse: 60   Weight: 63 kg (138 lb 14.2 oz)   Height: 5' 3" (1.6 m)     Physical Exam  Vitals and nursing note reviewed.   Constitutional:       Appearance: Normal appearance.   Cardiovascular:      Pulses:           Dorsalis pedis pulses are 2+ on the right side and 2+ on the left side.        Posterior tibial pulses are 2+ on the right side and 2+ on the left side.   Pulmonary:      Effort: Pulmonary effort is normal.   Musculoskeletal:         General: Swelling and tenderness present.      Right foot: Decreased range of motion.        Feet:    Feet:      Right foot:      Protective Sensation: 2 sites tested.  2 sites sensed.      Skin integrity: Erythema present.      Left foot:      Protective Sensation: 2 sites tested.  2 sites sensed.      Skin integrity: Erythema present.   Skin:     Capillary Refill: Capillary refill takes less than 2 seconds.      Findings: Erythema present.   Neurological:      General: No focal deficit present.      Mental Status: She is alert.   Psychiatric:         Mood and Affect: Mood normal.         Behavior: Behavior normal.                              Assessment:       1. Plantar fasciitis        Plan:       Patient presents today for follow-up plantar fasciitis left.  Patient states she is doing considerably better she states within 3 days her foot pain was gone she is tolerating the blue arch pads very well she states she did purchase the Oofos which have made all the difference she states they are extremely comfortable and she has no discomfort when she wears these.  Patient states the Voltaren gel was of limited help she states she really thinks that the blue pads made the biggest difference she has been playing pickleball without discomfort.  I did advised the patient she needs to keep using the blue arch pads I dispensed additional blue arch pads to her advising her wear these at all times and I made her aware if at any point her pain starts to return even width the proper " arch support we may have to look at more aggressive type of support along the lines of possibly some power steps but as long as she is doing well we will stick with the blue arch pads at this time.  Follow-up as needed.  This note was created using [employer] voice recognition software that occasionally misinterpreted phrases or words.            [1]   Current Outpatient Medications   Medication Sig Dispense Refill    alendronate (FOSAMAX) 70 MG tablet TAKE 1 TABLET BY MOUTH ONCE A WEEK EVERY  7  DAYS 12 tablet 3    coenzyme Q10-vitamin E 100-100 mg-unit Cap Take by mouth Daily. 1 Capsule Oral Every day      fish oil-dha-epa 1,200-144-216 mg Cap Take by mouth Daily. 1 Capsule Oral Every day      hydrOXYzine HCL (ATARAX) 10 MG Tab Take 1 tablet (10 mg total) by mouth 3 (three) times daily as needed. 60 tablet 1    minoxidiL (LONITEN) 2.5 MG tablet Take 1/2 tablet PO QHS 45 tablet 3    multivitamin (THERAGRAN) per tablet Take by mouth once daily. 1 Tablet Oral Every day      pantoprazole (PROTONIX) 40 MG tablet Take 1 tablet (40 mg total) by mouth once daily. 90 tablet 3    rosuvastatin (CRESTOR) 5 MG tablet Take 1 tablet by mouth once daily 90 tablet 1    valsartan-hydrochlorothiazide (DIOVAN-HCT) 160-12.5 mg per tablet Take 1 tablet by mouth once daily. 90 tablet 3    aspirin (ECOTRIN) 81 MG EC tablet Take 1 tablet (81 mg total) by mouth once daily.  0     No current facility-administered medications for this visit.

## 2025-09-06 DIAGNOSIS — R03.0 ELEVATED BLOOD PRESSURE READING: ICD-10-CM

## 2025-09-06 RX ORDER — ROSUVASTATIN CALCIUM 5 MG/1
5 TABLET, COATED ORAL
Qty: 90 TABLET | Refills: 0 | Status: SHIPPED | OUTPATIENT
Start: 2025-09-06

## (undated) DEVICE — TROCAR KII FIOS ZTHREAD 12X100

## (undated) DEVICE — TRAY GENERAL LAPAROSCOPY SMH

## (undated) DEVICE — NDL PNEUMOPERITONEUM 150MM

## (undated) DEVICE — APPLIER CLIP EPIX UNIV 5X34

## (undated) DEVICE — GLOVE BIOGEL SKINSENSE PI 7.0

## (undated) DEVICE — SCISSOR 5MMX35CM DIRECT DRIVE

## (undated) DEVICE — BAG TISS RETRV MONARCH 10MM

## (undated) DEVICE — SUT VICRYL+ 27 UR-6 VIOL

## (undated) DEVICE — TROCAR ENDOPATH XCEL 5X100MM

## (undated) DEVICE — ELECTRODE L HOOK 13IN 33M

## (undated) DEVICE — DISSECTOR KITTNER 5MM T 45CM S

## (undated) DEVICE — SYS SEE SHARP SCOPE ANTIFOG

## (undated) DEVICE — CANNULA ENDOPATH XCEL 5X100MM

## (undated) DEVICE — ELECTRODE REM PLYHSV RETURN 9

## (undated) DEVICE — KIT PROCEDURE STER INLET CLOSU

## (undated) DEVICE — SUT MCRYL PLUS 4-0 PS2 27IN

## (undated) DEVICE — CORD MPLR STR PLUG DISP 10FT

## (undated) DEVICE — SOL NACL IRR 1000ML BTL

## (undated) DEVICE — ADHESIVE DERMABOND ADVANCED